# Patient Record
Sex: MALE | Race: WHITE | Employment: OTHER | URBAN - METROPOLITAN AREA
[De-identification: names, ages, dates, MRNs, and addresses within clinical notes are randomized per-mention and may not be internally consistent; named-entity substitution may affect disease eponyms.]

---

## 2018-08-28 ENCOUNTER — HOSPITAL ENCOUNTER (EMERGENCY)
Age: 78
Discharge: HOME | End: 2018-08-29
Attending: EMERGENCY MEDICINE | Admitting: EMERGENCY MEDICINE
Payer: MEDICARE

## 2018-08-28 VITALS
HEIGHT: 69 IN | TEMPERATURE: 97.7 F | SYSTOLIC BLOOD PRESSURE: 153 MMHG | HEART RATE: 72 BPM | DIASTOLIC BLOOD PRESSURE: 83 MMHG | RESPIRATION RATE: 18 BRPM | OXYGEN SATURATION: 96 %

## 2018-08-28 DIAGNOSIS — R11.2 NAUSEA AND VOMITING, INTRACTABILITY OF VOMITING NOT SPECIFIED, UNSPECIFIED VOMITING TYPE: Primary | ICD-10-CM

## 2018-08-28 LAB
ALBUMIN SERPL-MCNC: 3.9 G/DL (ref 3.5–5)
ALBUMIN/GLOB SERPL: 1 {RATIO} (ref 1.1–2.2)
ALP SERPL-CCNC: 71 U/L (ref 45–117)
ALT SERPL-CCNC: 13 U/L (ref 12–78)
ANION GAP SERPL CALC-SCNC: 12 MMOL/L (ref 5–15)
APPEARANCE UR: CLEAR
AST SERPL-CCNC: 11 U/L (ref 15–37)
BACTERIA URNS QL MICRO: NEGATIVE /HPF
BASOPHILS # BLD: 0.1 K/UL (ref 0–0.1)
BASOPHILS NFR BLD: 1 % (ref 0–1)
BILIRUB SERPL-MCNC: 0.3 MG/DL (ref 0.2–1)
BILIRUB UR QL: NEGATIVE
BUN SERPL-MCNC: 19 MG/DL (ref 6–20)
BUN/CREAT SERPL: 13 (ref 12–20)
CALCIUM SERPL-MCNC: 8.7 MG/DL (ref 8.5–10.1)
CHLORIDE SERPL-SCNC: 99 MMOL/L (ref 97–108)
CK MB CFR SERPL CALC: NORMAL % (ref 0–2.5)
CK MB SERPL-MCNC: <1 NG/ML (ref 5–25)
CK SERPL-CCNC: 56 U/L (ref 39–308)
CO2 SERPL-SCNC: 24 MMOL/L (ref 21–32)
COLOR UR: ABNORMAL
CREAT SERPL-MCNC: 1.44 MG/DL (ref 0.7–1.3)
DIFFERENTIAL METHOD BLD: ABNORMAL
EOSINOPHIL # BLD: 0 K/UL (ref 0–0.4)
EOSINOPHIL NFR BLD: 0 % (ref 0–7)
EPITH CASTS URNS QL MICRO: ABNORMAL /LPF
ERYTHROCYTE [DISTWIDTH] IN BLOOD BY AUTOMATED COUNT: 13.2 % (ref 11.5–14.5)
GLOBULIN SER CALC-MCNC: 3.9 G/DL (ref 2–4)
GLUCOSE SERPL-MCNC: 227 MG/DL (ref 65–100)
GLUCOSE UR STRIP.AUTO-MCNC: >1000 MG/DL
HCT VFR BLD AUTO: 40 % (ref 36.6–50.3)
HGB BLD-MCNC: 13.1 G/DL (ref 12.1–17)
HGB UR QL STRIP: NEGATIVE
HYALINE CASTS URNS QL MICRO: ABNORMAL /LPF (ref 0–5)
IMM GRANULOCYTES # BLD: 0.1 K/UL (ref 0–0.04)
IMM GRANULOCYTES NFR BLD AUTO: 1 % (ref 0–0.5)
KETONES UR QL STRIP.AUTO: 15 MG/DL
LEUKOCYTE ESTERASE UR QL STRIP.AUTO: NEGATIVE
LIPASE SERPL-CCNC: 195 U/L (ref 73–393)
LYMPHOCYTES # BLD: 0.7 K/UL (ref 0.8–3.5)
LYMPHOCYTES NFR BLD: 6 % (ref 12–49)
MCH RBC QN AUTO: 29.5 PG (ref 26–34)
MCHC RBC AUTO-ENTMCNC: 32.8 G/DL (ref 30–36.5)
MCV RBC AUTO: 90.1 FL (ref 80–99)
MONOCYTES # BLD: 0.6 K/UL (ref 0–1)
MONOCYTES NFR BLD: 5 % (ref 5–13)
NEUTS SEG # BLD: 9.6 K/UL (ref 1.8–8)
NEUTS SEG NFR BLD: 87 % (ref 32–75)
NITRITE UR QL STRIP.AUTO: NEGATIVE
NRBC # BLD: 0 K/UL (ref 0–0.01)
NRBC BLD-RTO: 0 PER 100 WBC
PH UR STRIP: 5 [PH] (ref 5–8)
PLATELET # BLD AUTO: 303 K/UL (ref 150–400)
PMV BLD AUTO: 10.4 FL (ref 8.9–12.9)
POTASSIUM SERPL-SCNC: 3.9 MMOL/L (ref 3.5–5.1)
PROT SERPL-MCNC: 7.8 G/DL (ref 6.4–8.2)
PROT UR STRIP-MCNC: NEGATIVE MG/DL
RBC # BLD AUTO: 4.44 M/UL (ref 4.1–5.7)
RBC #/AREA URNS HPF: ABNORMAL /HPF (ref 0–5)
RBC MORPH BLD: ABNORMAL
SODIUM SERPL-SCNC: 135 MMOL/L (ref 136–145)
SP GR UR REFRACTOMETRY: 1.02 (ref 1–1.03)
TROPONIN I BLD-MCNC: <0.04 NG/ML (ref 0–0.08)
TROPONIN I SERPL-MCNC: <0.05 NG/ML
UA: UC IF INDICATED,UAUC: ABNORMAL
UROBILINOGEN UR QL STRIP.AUTO: 0.2 EU/DL (ref 0.2–1)
WBC # BLD AUTO: 11.1 K/UL (ref 4.1–11.1)
WBC URNS QL MICRO: ABNORMAL /HPF (ref 0–4)

## 2018-08-28 PROCEDURE — 83690 ASSAY OF LIPASE: CPT | Performed by: EMERGENCY MEDICINE

## 2018-08-28 PROCEDURE — 80053 COMPREHEN METABOLIC PANEL: CPT | Performed by: EMERGENCY MEDICINE

## 2018-08-28 PROCEDURE — 82550 ASSAY OF CK (CPK): CPT | Performed by: EMERGENCY MEDICINE

## 2018-08-28 PROCEDURE — 84484 ASSAY OF TROPONIN QUANT: CPT | Performed by: EMERGENCY MEDICINE

## 2018-08-28 PROCEDURE — 96374 THER/PROPH/DIAG INJ IV PUSH: CPT

## 2018-08-28 PROCEDURE — 74011250636 HC RX REV CODE- 250/636: Performed by: EMERGENCY MEDICINE

## 2018-08-28 PROCEDURE — 93005 ELECTROCARDIOGRAM TRACING: CPT

## 2018-08-28 PROCEDURE — 99285 EMERGENCY DEPT VISIT HI MDM: CPT

## 2018-08-28 PROCEDURE — 81001 URINALYSIS AUTO W/SCOPE: CPT | Performed by: EMERGENCY MEDICINE

## 2018-08-28 PROCEDURE — 96361 HYDRATE IV INFUSION ADD-ON: CPT

## 2018-08-28 PROCEDURE — 85025 COMPLETE CBC W/AUTO DIFF WBC: CPT | Performed by: EMERGENCY MEDICINE

## 2018-08-28 PROCEDURE — 36415 COLL VENOUS BLD VENIPUNCTURE: CPT | Performed by: EMERGENCY MEDICINE

## 2018-08-28 PROCEDURE — 74011250637 HC RX REV CODE- 250/637: Performed by: EMERGENCY MEDICINE

## 2018-08-28 RX ORDER — ONDANSETRON 4 MG/1
4 TABLET, ORALLY DISINTEGRATING ORAL
Qty: 16 TAB | Refills: 0 | Status: SHIPPED | OUTPATIENT
Start: 2018-08-28 | End: 2021-01-01

## 2018-08-28 RX ORDER — BUTALBITAL, ACETAMINOPHEN AND CAFFEINE 50; 325; 40 MG/1; MG/1; MG/1
2 TABLET ORAL
Status: COMPLETED | OUTPATIENT
Start: 2018-08-28 | End: 2018-08-28

## 2018-08-28 RX ORDER — ONDANSETRON 2 MG/ML
4 INJECTION INTRAMUSCULAR; INTRAVENOUS
Status: COMPLETED | OUTPATIENT
Start: 2018-08-28 | End: 2018-08-28

## 2018-08-28 RX ADMIN — ONDANSETRON 4 MG: 2 INJECTION, SOLUTION INTRAMUSCULAR; INTRAVENOUS at 21:06

## 2018-08-28 RX ADMIN — SODIUM CHLORIDE 1000 ML: 900 INJECTION, SOLUTION INTRAVENOUS at 22:45

## 2018-08-28 RX ADMIN — BUTALBITAL, ACETAMINOPHEN AND CAFFEINE 2 TABLET: 50; 325; 40 TABLET ORAL at 22:27

## 2018-08-29 LAB
ATRIAL RATE: 68 BPM
CALCULATED P AXIS, ECG09: 63 DEGREES
CALCULATED R AXIS, ECG10: -12 DEGREES
CALCULATED T AXIS, ECG11: 64 DEGREES
DIAGNOSIS, 93000: NORMAL
P-R INTERVAL, ECG05: 156 MS
Q-T INTERVAL, ECG07: 406 MS
QRS DURATION, ECG06: 78 MS
QTC CALCULATION (BEZET), ECG08: 431 MS
VENTRICULAR RATE, ECG03: 68 BPM

## 2018-08-29 NOTE — PROGRESS NOTES
Dr Lissett Machado reviewed discharge instructions with the patient. The patient verbalized understanding. All questions and concerns were addressed. The patient  is discharged in the care of family members with instructions and prescriptions in hand. Pt is alert and oriented x 4. Respirations are clear and unlabored.

## 2018-08-29 NOTE — DISCHARGE INSTRUCTIONS
Nausea and Vomiting: Care Instructions  Your Care Instructions    When you are nauseated, you may feel weak and sweaty and notice a lot of saliva in your mouth. Nausea often leads to vomiting. Most of the time you do not need to worry about nausea and vomiting, but they can be signs of other illnesses. Two common causes of nausea and vomiting are stomach flu and food poisoning. Nausea and vomiting from viral stomach flu will usually start to improve within 24 hours. Nausea and vomiting from food poisoning may last from 12 to 48 hours. The doctor has checked you carefully, but problems can develop later. If you notice any problems or new symptoms, get medical treatment right away. Follow-up care is a key part of your treatment and safety. Be sure to make and go to all appointments, and call your doctor if you are having problems. It's also a good idea to know your test results and keep a list of the medicines you take. How can you care for yourself at home? · To prevent dehydration, drink plenty of fluids, enough so that your urine is light yellow or clear like water. Choose water and other caffeine-free clear liquids until you feel better. If you have kidney, heart, or liver disease and have to limit fluids, talk with your doctor before you increase the amount of fluids you drink. · Rest in bed until you feel better. · When you are able to eat, try clear soups, mild foods, and liquids until all symptoms are gone for 12 to 48 hours. Other good choices include dry toast, crackers, cooked cereal, and gelatin dessert, such as Jell-O. When should you call for help? Call 911 anytime you think you may need emergency care. For example, call if:    · You passed out (lost consciousness).    Call your doctor now or seek immediate medical care if:    · You have symptoms of dehydration, such as:  ¨ Dry eyes and a dry mouth. ¨ Passing only a little dark urine.   ¨ Feeling thirstier than usual.     · You have new or worsening belly pain.     · You have a new or higher fever.     · You vomit blood or what looks like coffee grounds.    Watch closely for changes in your health, and be sure to contact your doctor if:    · You have ongoing nausea and vomiting.     · Your vomiting is getting worse.     · Your vomiting lasts longer than 2 days.     · You are not getting better as expected. Where can you learn more? Go to http://leigh-jan.info/. Enter 25 [id number] in the search box to learn more about \"Nausea and Vomiting: Care Instructions. \"  Current as of: November 20, 2017  Content Version: 11.7  © 6712-1573 [employer]. Care instructions adapted under license by [employer] (which disclaims liability or warranty for this information). If you have questions about a medical condition or this instruction, always ask your healthcare professional. [address] any warranty or liability for your use of this information.

## 2018-08-29 NOTE — ED NOTES
[facility] According to [facility] he was found lying down on the floor with an emesis basin. Patient was alert and oriented but with complaints of nausea. Patient received on dose of zofran en route to [facility].

## 2018-08-29 NOTE — ED PROVIDER NOTES
EMERGENCY DEPARTMENT HISTORY AND PHYSICAL EXAM 
 
 
Date: 8/28/2018 Patient Name: Adryan Howe History of Presenting Illness Chief Complaint Patient presents with  Nausea 1 episode of vomiting around 1415 this afternoon  Vomiting History Provided By: Patient and EMS 
 
HPI: Adryan Howe, 66 y.o. male with PMHx presents via EMS to the ED with cc of sudden onset nausea and vomiting with associated dizziness and diaphoresis that started around 14:15 today. Pt states that he was sitting in his recliner when his symptoms began. Per EMS, pt was on floor with emesis basin on their arrival, and was given Zofran en route to hospital. He denies any abd pain, CP, or SOB. There are no other complaints, changes, or physical findings at this time. PCP: Maisha Toussaint MD 
 
Past History Past Medical History: No past medical history on file. Past Surgical History: No past surgical history on file. Family History: No family history on file. Social History: 
Social History Substance Use Topics  Smoking status: Not on file  Smokeless tobacco: Not on file  Alcohol use Not on file Allergies: Allergies Allergen Reactions  Pcn [Penicillins] Rash Review of Systems Review of Systems Constitutional: Positive for diaphoresis. Negative for chills, fatigue and fever. HENT: Negative for congestion and rhinorrhea. Eyes: Negative for visual disturbance. Respiratory: Negative for cough, shortness of breath and wheezing. Cardiovascular: Negative for chest pain and palpitations. Gastrointestinal: Positive for nausea and vomiting. Negative for abdominal distention, abdominal pain, constipation and diarrhea. Endocrine: Negative. Genitourinary: Negative for difficulty urinating and dysuria. Musculoskeletal: Negative. Skin: Negative for rash. Neurological: Positive for dizziness. Negative for weakness and light-headedness. Psychiatric/Behavioral: Negative for suicidal ideas. Physical Exam  
Physical Exam  
Constitutional: He is oriented to person, place, and time. He appears well-developed and well-nourished. No distress. HENT:  
Head: Normocephalic and atraumatic. Mouth/Throat: Oropharynx is clear and moist.  
Eyes: Conjunctivae and EOM are normal.  
Neck: Neck supple. No JVD present. No tracheal deviation present. Cardiovascular: Normal rate, regular rhythm and intact distal pulses. Exam reveals no gallop and no friction rub. No murmur heard. Pulmonary/Chest: Effort normal and breath sounds normal. No stridor. No respiratory distress. He has no wheezes. Abdominal: Soft. Bowel sounds are normal. He exhibits no distension and no mass. There is no tenderness. There is no guarding. Musculoskeletal: Normal range of motion. He exhibits no edema or tenderness. No deformity Neurological: He is alert and oriented to person, place, and time. He has normal strength. No focal deficits Skin: Skin is warm, dry and intact. No rash noted. Psychiatric: He has a normal mood and affect. His behavior is normal. Judgment and thought content normal.  
Nursing note and vitals reviewed. Diagnostic Study Results Labs - Recent Results (from the past 12 hour(s)) EKG, 12 LEAD, INITIAL Collection Time: 08/28/18  9:01 PM  
Result Value Ref Range Ventricular Rate 68 BPM  
 Atrial Rate 68 BPM  
 P-R Interval 156 ms QRS Duration 78 ms Q-T Interval 406 ms QTC Calculation (Bezet) 431 ms Calculated P Axis 63 degrees Calculated R Axis -12 degrees Calculated T Axis 64 degrees Diagnosis Sinus rhythm with premature atrial complexes No previous ECGs available METABOLIC PANEL, COMPREHENSIVE Collection Time: 08/28/18  9:06 PM  
Result Value Ref Range Sodium 135 (L) 136 - 145 mmol/L Potassium 3.9 3.5 - 5.1 mmol/L  Chloride 99 97 - 108 mmol/L  
 CO2 24 21 - 32 mmol/L  
 Anion gap 12 5 - 15 mmol/L Glucose 227 (H) 65 - 100 mg/dL BUN 19 6 - 20 MG/DL Creatinine 1.44 (H) 0.70 - 1.30 MG/DL  
 BUN/Creatinine ratio 13 12 - 20 GFR est AA 58 (L) >60 ml/min/1.73m2 GFR est non-AA 47 (L) >60 ml/min/1.73m2 Calcium 8.7 8.5 - 10.1 MG/DL Bilirubin, total 0.3 0.2 - 1.0 MG/DL  
 ALT (SGPT) 13 12 - 78 U/L  
 AST (SGOT) 11 (L) 15 - 37 U/L Alk. phosphatase 71 45 - 117 U/L Protein, total 7.8 6.4 - 8.2 g/dL Albumin 3.9 3.5 - 5.0 g/dL Globulin 3.9 2.0 - 4.0 g/dL A-G Ratio 1.0 (L) 1.1 - 2.2 CK W/ CKMB & INDEX Collection Time: 08/28/18  9:06 PM  
Result Value Ref Range CK 56 39 - 308 U/L  
 CK - MB <1.0 <3.6 NG/ML  
 CK-MB Index Cannot be calculated 0 - 2.5    
CBC WITH AUTOMATED DIFF Collection Time: 08/28/18  9:06 PM  
Result Value Ref Range WBC 11.1 4.1 - 11.1 K/uL  
 RBC 4.44 4.10 - 5.70 M/uL  
 HGB 13.1 12.1 - 17.0 g/dL HCT 40.0 36.6 - 50.3 % MCV 90.1 80.0 - 99.0 FL  
 MCH 29.5 26.0 - 34.0 PG  
 MCHC 32.8 30.0 - 36.5 g/dL  
 RDW 13.2 11.5 - 14.5 % PLATELET 719 725 - 482 K/uL MPV 10.4 8.9 - 12.9 FL  
 NRBC 0.0 0  WBC ABSOLUTE NRBC 0.00 0.00 - 0.01 K/uL NEUTROPHILS 87 (H) 32 - 75 % LYMPHOCYTES 6 (L) 12 - 49 % MONOCYTES 5 5 - 13 % EOSINOPHILS 0 0 - 7 % BASOPHILS 1 0 - 1 % IMMATURE GRANULOCYTES 1 (H) 0.0 - 0.5 % ABS. NEUTROPHILS 9.6 (H) 1.8 - 8.0 K/UL  
 ABS. LYMPHOCYTES 0.7 (L) 0.8 - 3.5 K/UL  
 ABS. MONOCYTES 0.6 0.0 - 1.0 K/UL  
 ABS. EOSINOPHILS 0.0 0.0 - 0.4 K/UL  
 ABS. BASOPHILS 0.1 0.0 - 0.1 K/UL  
 ABS. IMM. GRANS. 0.1 (H) 0.00 - 0.04 K/UL  
 DF AUTOMATED    
 RBC COMMENTS NORMOCYTIC, NORMOCHROMIC    
TROPONIN I Collection Time: 08/28/18  9:06 PM  
Result Value Ref Range Troponin-I, Qt. <0.05 <0.05 ng/mL LIPASE Collection Time: 08/28/18  9:06 PM  
Result Value Ref Range Lipase 195 73 - 393 U/L  
URINALYSIS W/ REFLEX CULTURE Collection Time: 08/28/18 10:46 PM  
Result Value Ref Range Color YELLOW/STRAW Appearance CLEAR CLEAR Specific gravity 1.021 1.003 - 1.030    
 pH (UA) 5.0 5.0 - 8.0 Protein NEGATIVE  NEG mg/dL Glucose >1000 (A) NEG mg/dL Ketone 15 (A) NEG mg/dL Bilirubin NEGATIVE  NEG Blood NEGATIVE  NEG Urobilinogen 0.2 0.2 - 1.0 EU/dL Nitrites NEGATIVE  NEG Leukocyte Esterase NEGATIVE  NEG    
 WBC 0-4 0 - 4 /hpf  
 RBC 0-5 0 - 5 /hpf Epithelial cells FEW FEW /lpf Bacteria NEGATIVE  NEG /hpf  
 UA:UC IF INDICATED CULTURE NOT INDICATED BY UA RESULT CNI Hyaline cast 0-2 0 - 5 /lpf POC TROPONIN-I Collection Time: 08/28/18 11:45 PM  
Result Value Ref Range Troponin-I (POC) <0.04 0.00 - 0.08 ng/mL Medical Decision Making I am the first provider for this patient. I reviewed the vital signs, available nursing notes, past medical history, past surgical history, family history and social history. Vital Signs-Reviewed the patient's vital signs. Patient Vitals for the past 12 hrs: 
 Temp Pulse Resp BP SpO2  
08/28/18 2230 - - - 153/83 96 % 08/28/18 2200 - - - 139/79 98 % 08/28/18 2130 - - - 143/80 96 % 08/28/18 2104 97.7 °F (36.5 °C) 72 18 - 96 % Pulse Oximetry Analysis - 99% on room air Cardiac Monitor:  
Rate: 75 bpm 
Rhythm: Normal Sinus Rhythm EKG interpretation: (Preliminary) 21:01 Rhythm: sinus rhythm with PAC's; and regular . Rate (approx.): 68; Axis: left axis deviation; TN interval: normal; QRS interval: normal ; ST/T wave: no ST changes; Other findings: otherwise normal. 
Written by Maria R Rangel ED Scribe, as dictated by Johnny Sorensen DO. Records Reviewed: Nursing Notes, Old Medical Records and Ambulance Run Sheet Provider Notes (Medical Decision Making): Abd exam was benign, DDx include gastritis, PUD, gastroenteritis, atypical presentation of ACS, pancreatitis, UTI 
 
ED Course:  
Initial assessment performed.  The patients presenting problems have been discussed, and they are in agreement with the care plan formulated and outlined with them. I have encouraged them to ask questions as they arise throughout their visit. 11:16 PM 
Pt has been re-evaluated and reports to be feeling better, he is tolerating PO, will order another POC troponin and if normal he can be discharged. Disposition: 
 
DISCHARGE NOTE 
12:19 AM 
The patient has been re-evaluated and is ready for discharge. Reviewed available results with patient. Counseled patient on diagnosis and care plan. Patient has expressed understanding, and all questions have been answered. Patient agrees with plan and agrees to follow up as recommended, or return to the ED if their symptoms worsen. Discharge instructions have been provided and explained to the patient, along with reasons to return to the ED. PLAN: 
1. Current Discharge Medication List  
  
START taking these medications Details  
ondansetron (ZOFRAN ODT) 4 mg disintegrating tablet Take 1 Tab by mouth every eight (8) hours as needed for Nausea. Qty: 16 Tab, Refills: 0  
  
  
 
2. Follow-up Information Follow up With Details Comments Contact Info Sujey Cano MD Schedule an appointment as soon as possible for a visit  [address] [facility] 
[address] [address] 
[phone] [facility] EMERGENCY DEPT  As needed, If symptoms worsen [address] [facility] 
[phone] Return to ED if worse Diagnosis Clinical Impression: 1. Nausea and vomiting, intractability of vomiting not specified, unspecified vomiting type Attestations: This note is prepared by Jadyn Watkins, acting as Scribe for Luis Armando Luu DO. Luis Armando Luu DO: The scribe's documentation has been prepared under my direction and personally reviewed by me in its entirety. I confirm that the note above accurately reflects all work, treatment, procedures, and medical decision making performed by me.

## 2021-01-01 ENCOUNTER — HOME CARE VISIT (OUTPATIENT)
Dept: HOSPICE | Facility: HOSPICE | Age: 81
End: 2021-01-01
Payer: MEDICARE

## 2021-01-01 ENCOUNTER — APPOINTMENT (OUTPATIENT)
Dept: GENERAL RADIOLOGY | Age: 81
DRG: 064 | End: 2021-01-01
Attending: NURSE PRACTITIONER
Payer: MEDICARE

## 2021-01-01 ENCOUNTER — HOME CARE VISIT (OUTPATIENT)
Dept: SCHEDULING | Facility: HOME HEALTH | Age: 81
End: 2021-01-01
Payer: MEDICARE

## 2021-01-01 ENCOUNTER — HOSPICE ADMISSION (OUTPATIENT)
Dept: HOSPICE | Facility: HOSPICE | Age: 81
End: 2021-01-01
Payer: MEDICARE

## 2021-01-01 ENCOUNTER — APPOINTMENT (OUTPATIENT)
Dept: GENERAL RADIOLOGY | Age: 81
DRG: 064 | End: 2021-01-01
Attending: HOSPITALIST
Payer: MEDICARE

## 2021-01-01 ENCOUNTER — HOSPITAL ENCOUNTER (EMERGENCY)
Age: 81
Discharge: ANOTHER HOSPITAL | End: 2021-04-07
Attending: STUDENT IN AN ORGANIZED HEALTH CARE EDUCATION/TRAINING PROGRAM
Payer: MEDICARE

## 2021-01-01 ENCOUNTER — APPOINTMENT (OUTPATIENT)
Dept: CT IMAGING | Age: 81
DRG: 064 | End: 2021-01-01
Attending: NURSE PRACTITIONER
Payer: MEDICARE

## 2021-01-01 ENCOUNTER — APPOINTMENT (OUTPATIENT)
Dept: NON INVASIVE DIAGNOSTICS | Age: 81
DRG: 064 | End: 2021-01-01
Attending: NURSE PRACTITIONER
Payer: MEDICARE

## 2021-01-01 ENCOUNTER — APPOINTMENT (OUTPATIENT)
Dept: CT IMAGING | Age: 81
End: 2021-01-01
Attending: STUDENT IN AN ORGANIZED HEALTH CARE EDUCATION/TRAINING PROGRAM
Payer: MEDICARE

## 2021-01-01 ENCOUNTER — APPOINTMENT (OUTPATIENT)
Dept: MRI IMAGING | Age: 81
DRG: 064 | End: 2021-01-01
Attending: NURSE PRACTITIONER
Payer: MEDICARE

## 2021-01-01 ENCOUNTER — HOSPITAL ENCOUNTER (INPATIENT)
Age: 81
LOS: 8 days | Discharge: HOSPICE | DRG: 064 | End: 2021-04-15
Attending: INTERNAL MEDICINE | Admitting: INTERNAL MEDICINE
Payer: MEDICARE

## 2021-01-01 ENCOUNTER — APPOINTMENT (OUTPATIENT)
Dept: GENERAL RADIOLOGY | Age: 81
End: 2021-01-01
Attending: STUDENT IN AN ORGANIZED HEALTH CARE EDUCATION/TRAINING PROGRAM
Payer: MEDICARE

## 2021-01-01 VITALS
HEART RATE: 86 BPM | OXYGEN SATURATION: 90 % | RESPIRATION RATE: 16 BRPM | SYSTOLIC BLOOD PRESSURE: 192 MMHG | DIASTOLIC BLOOD PRESSURE: 102 MMHG | TEMPERATURE: 99.5 F

## 2021-01-01 VITALS
HEART RATE: 104 BPM | TEMPERATURE: 99.6 F | WEIGHT: 190.04 LBS | OXYGEN SATURATION: 96 % | BODY MASS INDEX: 28.15 KG/M2 | SYSTOLIC BLOOD PRESSURE: 143 MMHG | RESPIRATION RATE: 20 BRPM | DIASTOLIC BLOOD PRESSURE: 68 MMHG | HEIGHT: 69 IN

## 2021-01-01 VITALS
DIASTOLIC BLOOD PRESSURE: 104 MMHG | SYSTOLIC BLOOD PRESSURE: 152 MMHG | RESPIRATION RATE: 18 BRPM | OXYGEN SATURATION: 96 % | HEART RATE: 80 BPM

## 2021-01-01 VITALS
SYSTOLIC BLOOD PRESSURE: 198 MMHG | OXYGEN SATURATION: 96 % | WEIGHT: 194 LBS | DIASTOLIC BLOOD PRESSURE: 100 MMHG | TEMPERATURE: 97.2 F | RESPIRATION RATE: 22 BRPM | HEART RATE: 80 BPM | BODY MASS INDEX: 30.38 KG/M2

## 2021-01-01 VITALS
OXYGEN SATURATION: 96 % | RESPIRATION RATE: 21 BRPM | DIASTOLIC BLOOD PRESSURE: 116 MMHG | WEIGHT: 194.45 LBS | SYSTOLIC BLOOD PRESSURE: 186 MMHG | BODY MASS INDEX: 30.52 KG/M2 | TEMPERATURE: 100.1 F | HEART RATE: 91 BPM | HEIGHT: 67 IN

## 2021-01-01 VITALS
TEMPERATURE: 99 F | DIASTOLIC BLOOD PRESSURE: 60 MMHG | HEART RATE: 75 BPM | SYSTOLIC BLOOD PRESSURE: 180 MMHG | OXYGEN SATURATION: 94 % | RESPIRATION RATE: 18 BRPM

## 2021-01-01 VITALS
DIASTOLIC BLOOD PRESSURE: 98 MMHG | OXYGEN SATURATION: 94 % | SYSTOLIC BLOOD PRESSURE: 200 MMHG | RESPIRATION RATE: 18 BRPM | HEART RATE: 87 BPM | TEMPERATURE: 98.2 F

## 2021-01-01 VITALS
OXYGEN SATURATION: 95 % | RESPIRATION RATE: 18 BRPM | SYSTOLIC BLOOD PRESSURE: 136 MMHG | DIASTOLIC BLOOD PRESSURE: 78 MMHG | HEART RATE: 91 BPM | TEMPERATURE: 98.7 F

## 2021-01-01 DIAGNOSIS — I10 ESSENTIAL HYPERTENSION: ICD-10-CM

## 2021-01-01 DIAGNOSIS — I61.9 LEFT-SIDED NONTRAUMATIC INTRACEREBRAL HEMORRHAGE, UNSPECIFIED CEREBRAL LOCATION (HCC): ICD-10-CM

## 2021-01-01 DIAGNOSIS — Z71.89 GOALS OF CARE, COUNSELING/DISCUSSION: ICD-10-CM

## 2021-01-01 DIAGNOSIS — I61.0 BASAL GANGLIA HEMORRHAGE (HCC): ICD-10-CM

## 2021-01-01 DIAGNOSIS — R53.1 RIGHT SIDED WEAKNESS: ICD-10-CM

## 2021-01-01 DIAGNOSIS — Z51.5 END OF LIFE CARE: ICD-10-CM

## 2021-01-01 DIAGNOSIS — I61.9 INTRAPARENCHYMAL HEMORRHAGE OF BRAIN (HCC): Primary | ICD-10-CM

## 2021-01-01 DIAGNOSIS — R63.30 FEEDING DIFFICULTIES: ICD-10-CM

## 2021-01-01 DIAGNOSIS — R47.1 DYSARTHRIA: ICD-10-CM

## 2021-01-01 DIAGNOSIS — Z91.89 AT HIGH RISK FOR ASPIRATION: ICD-10-CM

## 2021-01-01 LAB
ALBUMIN SERPL-MCNC: 4.1 G/DL (ref 3.5–5)
ALBUMIN/GLOB SERPL: 1 {RATIO} (ref 1.1–2.2)
ALP SERPL-CCNC: 84 U/L (ref 45–117)
ALT SERPL-CCNC: 13 U/L (ref 12–78)
ANION GAP SERPL CALC-SCNC: 10 MMOL/L (ref 5–15)
ANION GAP SERPL CALC-SCNC: 10 MMOL/L (ref 5–15)
ANION GAP SERPL CALC-SCNC: 11 MMOL/L (ref 5–15)
ANION GAP SERPL CALC-SCNC: 5 MMOL/L (ref 5–15)
ANION GAP SERPL CALC-SCNC: 6 MMOL/L (ref 5–15)
ANION GAP SERPL CALC-SCNC: 7 MMOL/L (ref 5–15)
ANION GAP SERPL CALC-SCNC: 8 MMOL/L (ref 5–15)
ANION GAP SERPL CALC-SCNC: 9 MMOL/L (ref 5–15)
ANION GAP SERPL CALC-SCNC: 9 MMOL/L (ref 5–15)
APPEARANCE UR: CLEAR
ARTERIAL PATENCY WRIST A: NO
AST SERPL-CCNC: 10 U/L (ref 15–37)
ATRIAL RATE: 104 BPM
BACTERIA SPEC CULT: ABNORMAL
BACTERIA SPEC CULT: ABNORMAL
BACTERIA SPEC CULT: NORMAL
BACTERIA URNS QL MICRO: NEGATIVE /HPF
BASE DEFICIT BLD-SCNC: 1 MMOL/L
BASOPHILS # BLD: 0 K/UL (ref 0–0.1)
BASOPHILS NFR BLD: 0 % (ref 0–1)
BDY SITE: ABNORMAL
BILIRUB SERPL-MCNC: 0.5 MG/DL (ref 0.2–1)
BILIRUB UR QL CFM: NEGATIVE
BNP SERPL-MCNC: 132 PG/ML
BUN SERPL-MCNC: 21 MG/DL (ref 6–20)
BUN SERPL-MCNC: 22 MG/DL (ref 6–20)
BUN SERPL-MCNC: 26 MG/DL (ref 6–20)
BUN SERPL-MCNC: 32 MG/DL (ref 6–20)
BUN SERPL-MCNC: 35 MG/DL (ref 6–20)
BUN SERPL-MCNC: 39 MG/DL (ref 6–20)
BUN SERPL-MCNC: 40 MG/DL (ref 6–20)
BUN SERPL-MCNC: 42 MG/DL (ref 6–20)
BUN SERPL-MCNC: 46 MG/DL (ref 6–20)
BUN/CREAT SERPL: 18 (ref 12–20)
BUN/CREAT SERPL: 19 (ref 12–20)
BUN/CREAT SERPL: 23 (ref 12–20)
BUN/CREAT SERPL: 28 (ref 12–20)
BUN/CREAT SERPL: 32 (ref 12–20)
BUN/CREAT SERPL: 32 (ref 12–20)
BUN/CREAT SERPL: 34 (ref 12–20)
BUN/CREAT SERPL: 34 (ref 12–20)
BUN/CREAT SERPL: 37 (ref 12–20)
CA-I BLD-SCNC: 1.11 MMOL/L (ref 1.12–1.32)
CALCIUM SERPL-MCNC: 8 MG/DL (ref 8.5–10.1)
CALCIUM SERPL-MCNC: 8 MG/DL (ref 8.5–10.1)
CALCIUM SERPL-MCNC: 8.1 MG/DL (ref 8.5–10.1)
CALCIUM SERPL-MCNC: 8.3 MG/DL (ref 8.5–10.1)
CALCIUM SERPL-MCNC: 8.4 MG/DL (ref 8.5–10.1)
CALCIUM SERPL-MCNC: 8.6 MG/DL (ref 8.5–10.1)
CALCIUM SERPL-MCNC: 8.7 MG/DL (ref 8.5–10.1)
CALCIUM SERPL-MCNC: 8.9 MG/DL (ref 8.5–10.1)
CALCIUM SERPL-MCNC: 8.9 MG/DL (ref 8.5–10.1)
CALCULATED P AXIS, ECG09: 30 DEGREES
CALCULATED R AXIS, ECG10: -19 DEGREES
CALCULATED T AXIS, ECG11: 63 DEGREES
CHLORIDE SERPL-SCNC: 102 MMOL/L (ref 97–108)
CHLORIDE SERPL-SCNC: 105 MMOL/L (ref 97–108)
CHLORIDE SERPL-SCNC: 107 MMOL/L (ref 97–108)
CHLORIDE SERPL-SCNC: 110 MMOL/L (ref 97–108)
CHLORIDE SERPL-SCNC: 113 MMOL/L (ref 97–108)
CHLORIDE SERPL-SCNC: 113 MMOL/L (ref 97–108)
CHLORIDE SERPL-SCNC: 114 MMOL/L (ref 97–108)
CHLORIDE SERPL-SCNC: 116 MMOL/L (ref 97–108)
CHLORIDE SERPL-SCNC: 99 MMOL/L (ref 97–108)
CHOLEST SERPL-MCNC: 160 MG/DL
CO2 SERPL-SCNC: 21 MMOL/L (ref 21–32)
CO2 SERPL-SCNC: 22 MMOL/L (ref 21–32)
CO2 SERPL-SCNC: 23 MMOL/L (ref 21–32)
CO2 SERPL-SCNC: 24 MMOL/L (ref 21–32)
CO2 SERPL-SCNC: 25 MMOL/L (ref 21–32)
CO2 SERPL-SCNC: 25 MMOL/L (ref 21–32)
CO2 SERPL-SCNC: 26 MMOL/L (ref 21–32)
COLOR UR: ABNORMAL
COVID-19 RAPID TEST, COVR: NOT DETECTED
CREAT SERPL-MCNC: 1.11 MG/DL (ref 0.7–1.3)
CREAT SERPL-MCNC: 1.12 MG/DL (ref 0.7–1.3)
CREAT SERPL-MCNC: 1.14 MG/DL (ref 0.7–1.3)
CREAT SERPL-MCNC: 1.14 MG/DL (ref 0.7–1.3)
CREAT SERPL-MCNC: 1.15 MG/DL (ref 0.7–1.3)
CREAT SERPL-MCNC: 1.16 MG/DL (ref 0.7–1.3)
CREAT SERPL-MCNC: 1.16 MG/DL (ref 0.7–1.3)
CREAT SERPL-MCNC: 1.21 MG/DL (ref 0.7–1.3)
CREAT SERPL-MCNC: 1.44 MG/DL (ref 0.7–1.3)
DIAGNOSIS, 93000: NORMAL
DIFFERENTIAL METHOD BLD: ABNORMAL
EOSINOPHIL # BLD: 0 K/UL (ref 0–0.4)
EOSINOPHIL NFR BLD: 0 % (ref 0–7)
EPITH CASTS URNS QL MICRO: ABNORMAL /LPF
ERYTHROCYTE [DISTWIDTH] IN BLOOD BY AUTOMATED COUNT: 14.1 % (ref 11.5–14.5)
ERYTHROCYTE [DISTWIDTH] IN BLOOD BY AUTOMATED COUNT: 14.1 % (ref 11.5–14.5)
ERYTHROCYTE [DISTWIDTH] IN BLOOD BY AUTOMATED COUNT: 14.7 % (ref 11.5–14.5)
ERYTHROCYTE [DISTWIDTH] IN BLOOD BY AUTOMATED COUNT: 15 % (ref 11.5–14.5)
ERYTHROCYTE [DISTWIDTH] IN BLOOD BY AUTOMATED COUNT: 15.1 % (ref 11.5–14.5)
ERYTHROCYTE [DISTWIDTH] IN BLOOD BY AUTOMATED COUNT: 15.2 % (ref 11.5–14.5)
ERYTHROCYTE [DISTWIDTH] IN BLOOD BY AUTOMATED COUNT: 15.3 % (ref 11.5–14.5)
EST. AVERAGE GLUCOSE BLD GHB EST-MCNC: 137 MG/DL
GAS FLOW.O2 O2 DELIVERY SYS: ABNORMAL L/MIN
GLOBULIN SER CALC-MCNC: 4.2 G/DL (ref 2–4)
GLUCOSE BLD STRIP.AUTO-MCNC: 199 MG/DL (ref 65–100)
GLUCOSE BLD STRIP.AUTO-MCNC: 210 MG/DL (ref 65–100)
GLUCOSE BLD STRIP.AUTO-MCNC: 211 MG/DL (ref 65–100)
GLUCOSE BLD STRIP.AUTO-MCNC: 214 MG/DL (ref 65–100)
GLUCOSE BLD STRIP.AUTO-MCNC: 219 MG/DL (ref 65–100)
GLUCOSE BLD STRIP.AUTO-MCNC: 220 MG/DL (ref 65–100)
GLUCOSE BLD STRIP.AUTO-MCNC: 223 MG/DL (ref 65–100)
GLUCOSE BLD STRIP.AUTO-MCNC: 228 MG/DL (ref 65–100)
GLUCOSE BLD STRIP.AUTO-MCNC: 231 MG/DL (ref 65–100)
GLUCOSE BLD STRIP.AUTO-MCNC: 231 MG/DL (ref 65–100)
GLUCOSE BLD STRIP.AUTO-MCNC: 234 MG/DL (ref 65–100)
GLUCOSE BLD STRIP.AUTO-MCNC: 236 MG/DL (ref 65–100)
GLUCOSE BLD STRIP.AUTO-MCNC: 239 MG/DL (ref 65–100)
GLUCOSE BLD STRIP.AUTO-MCNC: 240 MG/DL (ref 65–100)
GLUCOSE BLD STRIP.AUTO-MCNC: 243 MG/DL (ref 65–100)
GLUCOSE BLD STRIP.AUTO-MCNC: 249 MG/DL (ref 65–100)
GLUCOSE BLD STRIP.AUTO-MCNC: 254 MG/DL (ref 65–100)
GLUCOSE BLD STRIP.AUTO-MCNC: 255 MG/DL (ref 65–100)
GLUCOSE BLD STRIP.AUTO-MCNC: 259 MG/DL (ref 65–100)
GLUCOSE BLD STRIP.AUTO-MCNC: 259 MG/DL (ref 65–100)
GLUCOSE BLD STRIP.AUTO-MCNC: 262 MG/DL (ref 65–100)
GLUCOSE BLD STRIP.AUTO-MCNC: 267 MG/DL (ref 65–100)
GLUCOSE BLD STRIP.AUTO-MCNC: 280 MG/DL (ref 65–100)
GLUCOSE BLD STRIP.AUTO-MCNC: 281 MG/DL (ref 65–100)
GLUCOSE BLD STRIP.AUTO-MCNC: 287 MG/DL (ref 65–100)
GLUCOSE BLD STRIP.AUTO-MCNC: 289 MG/DL (ref 65–100)
GLUCOSE BLD STRIP.AUTO-MCNC: 295 MG/DL (ref 65–100)
GLUCOSE BLD STRIP.AUTO-MCNC: 299 MG/DL (ref 65–100)
GLUCOSE BLD STRIP.AUTO-MCNC: 302 MG/DL (ref 65–100)
GLUCOSE BLD STRIP.AUTO-MCNC: 317 MG/DL (ref 65–100)
GLUCOSE BLD STRIP.AUTO-MCNC: 347 MG/DL (ref 65–100)
GLUCOSE SERPL-MCNC: 190 MG/DL (ref 65–100)
GLUCOSE SERPL-MCNC: 196 MG/DL (ref 65–100)
GLUCOSE SERPL-MCNC: 223 MG/DL (ref 65–100)
GLUCOSE SERPL-MCNC: 234 MG/DL (ref 65–100)
GLUCOSE SERPL-MCNC: 239 MG/DL (ref 65–100)
GLUCOSE SERPL-MCNC: 266 MG/DL (ref 65–100)
GLUCOSE SERPL-MCNC: 268 MG/DL (ref 65–100)
GLUCOSE SERPL-MCNC: 304 MG/DL (ref 65–100)
GLUCOSE SERPL-MCNC: 305 MG/DL (ref 65–100)
GLUCOSE UR STRIP.AUTO-MCNC: NEGATIVE MG/DL
GRAM STN SPEC: ABNORMAL
HBA1C MFR BLD: 6.4 % (ref 4–5.6)
HCO3 BLD-SCNC: 24.4 MMOL/L (ref 22–26)
HCT VFR BLD AUTO: 35.6 % (ref 36.6–50.3)
HCT VFR BLD AUTO: 37.3 % (ref 36.6–50.3)
HCT VFR BLD AUTO: 38.5 % (ref 36.6–50.3)
HCT VFR BLD AUTO: 38.8 % (ref 36.6–50.3)
HCT VFR BLD AUTO: 40.8 % (ref 36.6–50.3)
HCT VFR BLD AUTO: 40.8 % (ref 36.6–50.3)
HCT VFR BLD AUTO: 44 % (ref 36.6–50.3)
HCT VFR BLD AUTO: 44.4 % (ref 36.6–50.3)
HCT VFR BLD AUTO: 44.4 % (ref 36.6–50.3)
HDLC SERPL-MCNC: 60 MG/DL
HDLC SERPL: 2.7 {RATIO} (ref 0–5)
HGB BLD-MCNC: 11.3 G/DL (ref 12.1–17)
HGB BLD-MCNC: 11.8 G/DL (ref 12.1–17)
HGB BLD-MCNC: 12.2 G/DL (ref 12.1–17)
HGB BLD-MCNC: 12.3 G/DL (ref 12.1–17)
HGB BLD-MCNC: 12.7 G/DL (ref 12.1–17)
HGB BLD-MCNC: 13.1 G/DL (ref 12.1–17)
HGB BLD-MCNC: 13.9 G/DL (ref 12.1–17)
HGB BLD-MCNC: 14.5 G/DL (ref 12.1–17)
HGB BLD-MCNC: 14.6 G/DL (ref 12.1–17)
HGB UR QL STRIP: NEGATIVE
HYALINE CASTS URNS QL MICRO: ABNORMAL /LPF (ref 0–5)
IMM GRANULOCYTES # BLD AUTO: 0.2 K/UL (ref 0–0.04)
IMM GRANULOCYTES NFR BLD AUTO: 1 % (ref 0–0.5)
KETONES UR QL STRIP.AUTO: 80 MG/DL
LACTATE SERPL-SCNC: 2 MMOL/L (ref 0.4–2)
LDLC SERPL CALC-MCNC: 69 MG/DL (ref 0–100)
LEUKOCYTE ESTERASE UR QL STRIP.AUTO: NEGATIVE
LIPID PROFILE,FLP: ABNORMAL
LYMPHOCYTES # BLD: 0.6 K/UL (ref 0.8–3.5)
LYMPHOCYTES NFR BLD: 4 % (ref 12–49)
MAGNESIUM SERPL-MCNC: 2.3 MG/DL (ref 1.6–2.4)
MAGNESIUM SERPL-MCNC: 2.4 MG/DL (ref 1.6–2.4)
MAGNESIUM SERPL-MCNC: 2.5 MG/DL (ref 1.6–2.4)
MAGNESIUM SERPL-MCNC: 2.6 MG/DL (ref 1.6–2.4)
MAGNESIUM SERPL-MCNC: 2.8 MG/DL (ref 1.6–2.4)
MAGNESIUM SERPL-MCNC: 2.9 MG/DL (ref 1.6–2.4)
MAGNESIUM SERPL-MCNC: 3 MG/DL (ref 1.6–2.4)
MCH RBC QN AUTO: 28.4 PG (ref 26–34)
MCH RBC QN AUTO: 28.6 PG (ref 26–34)
MCH RBC QN AUTO: 28.7 PG (ref 26–34)
MCH RBC QN AUTO: 28.7 PG (ref 26–34)
MCH RBC QN AUTO: 28.9 PG (ref 26–34)
MCH RBC QN AUTO: 29 PG (ref 26–34)
MCH RBC QN AUTO: 29.3 PG (ref 26–34)
MCHC RBC AUTO-ENTMCNC: 31.1 G/DL (ref 30–36.5)
MCHC RBC AUTO-ENTMCNC: 31.4 G/DL (ref 30–36.5)
MCHC RBC AUTO-ENTMCNC: 31.6 G/DL (ref 30–36.5)
MCHC RBC AUTO-ENTMCNC: 31.6 G/DL (ref 30–36.5)
MCHC RBC AUTO-ENTMCNC: 31.7 G/DL (ref 30–36.5)
MCHC RBC AUTO-ENTMCNC: 31.9 G/DL (ref 30–36.5)
MCHC RBC AUTO-ENTMCNC: 32.1 G/DL (ref 30–36.5)
MCHC RBC AUTO-ENTMCNC: 32.7 G/DL (ref 30–36.5)
MCHC RBC AUTO-ENTMCNC: 32.9 G/DL (ref 30–36.5)
MCV RBC AUTO: 87.1 FL (ref 80–99)
MCV RBC AUTO: 87.9 FL (ref 80–99)
MCV RBC AUTO: 90.4 FL (ref 80–99)
MCV RBC AUTO: 90.5 FL (ref 80–99)
MCV RBC AUTO: 90.8 FL (ref 80–99)
MCV RBC AUTO: 91.1 FL (ref 80–99)
MCV RBC AUTO: 91.5 FL (ref 80–99)
MCV RBC AUTO: 92.2 FL (ref 80–99)
MCV RBC AUTO: 92.3 FL (ref 80–99)
MONOCYTES # BLD: 0.5 K/UL (ref 0–1)
MONOCYTES NFR BLD: 3 % (ref 5–13)
NEUTS SEG # BLD: 14.4 K/UL (ref 1.8–8)
NEUTS SEG NFR BLD: 92 % (ref 32–75)
NITRITE UR QL STRIP.AUTO: NEGATIVE
NRBC # BLD: 0 K/UL (ref 0–0.01)
NRBC BLD-RTO: 0 PER 100 WBC
P-R INTERVAL, ECG05: 168 MS
PCO2 BLD: 40.4 MMHG (ref 35–45)
PH BLD: 7.39 [PH] (ref 7.35–7.45)
PH UR STRIP: 5.5 [PH] (ref 5–8)
PHOSPHATE SERPL-MCNC: 2 MG/DL (ref 2.6–4.7)
PHOSPHATE SERPL-MCNC: 2.1 MG/DL (ref 2.6–4.7)
PHOSPHATE SERPL-MCNC: 2.5 MG/DL (ref 2.6–4.7)
PHOSPHATE SERPL-MCNC: 2.8 MG/DL (ref 2.6–4.7)
PHOSPHATE SERPL-MCNC: 3.2 MG/DL (ref 2.6–4.7)
PHOSPHATE SERPL-MCNC: 3.2 MG/DL (ref 2.6–4.7)
PHOSPHATE SERPL-MCNC: 3.4 MG/DL (ref 2.6–4.7)
PHOSPHATE SERPL-MCNC: 4 MG/DL (ref 2.6–4.7)
PLATELET # BLD AUTO: 217 K/UL (ref 150–400)
PLATELET # BLD AUTO: 235 K/UL (ref 150–400)
PLATELET # BLD AUTO: 235 K/UL (ref 150–400)
PLATELET # BLD AUTO: 238 K/UL (ref 150–400)
PLATELET # BLD AUTO: 261 K/UL (ref 150–400)
PLATELET # BLD AUTO: 277 K/UL (ref 150–400)
PLATELET # BLD AUTO: 283 K/UL (ref 150–400)
PLATELET # BLD AUTO: 296 K/UL (ref 150–400)
PLATELET # BLD AUTO: 319 K/UL (ref 150–400)
PMV BLD AUTO: 10.3 FL (ref 8.9–12.9)
PMV BLD AUTO: 10.6 FL (ref 8.9–12.9)
PMV BLD AUTO: 10.7 FL (ref 8.9–12.9)
PMV BLD AUTO: 10.8 FL (ref 8.9–12.9)
PMV BLD AUTO: 11 FL (ref 8.9–12.9)
PMV BLD AUTO: 11 FL (ref 8.9–12.9)
PMV BLD AUTO: 11.1 FL (ref 8.9–12.9)
PMV BLD AUTO: 11.5 FL (ref 8.9–12.9)
PMV BLD AUTO: 11.8 FL (ref 8.9–12.9)
PO2 BLD: 26 MMHG (ref 80–100)
POTASSIUM SERPL-SCNC: 3.3 MMOL/L (ref 3.5–5.1)
POTASSIUM SERPL-SCNC: 3.5 MMOL/L (ref 3.5–5.1)
POTASSIUM SERPL-SCNC: 3.6 MMOL/L (ref 3.5–5.1)
POTASSIUM SERPL-SCNC: 3.8 MMOL/L (ref 3.5–5.1)
POTASSIUM SERPL-SCNC: 3.9 MMOL/L (ref 3.5–5.1)
POTASSIUM SERPL-SCNC: 4.2 MMOL/L (ref 3.5–5.1)
PROCALCITONIN SERPL-MCNC: <0.05 NG/ML
PROT SERPL-MCNC: 8.3 G/DL (ref 6.4–8.2)
PROT UR STRIP-MCNC: ABNORMAL MG/DL
Q-T INTERVAL, ECG07: 348 MS
QRS DURATION, ECG06: 78 MS
QTC CALCULATION (BEZET), ECG08: 457 MS
RBC # BLD AUTO: 3.86 M/UL (ref 4.1–5.7)
RBC # BLD AUTO: 4.11 M/UL (ref 4.1–5.7)
RBC # BLD AUTO: 4.26 M/UL (ref 4.1–5.7)
RBC # BLD AUTO: 4.26 M/UL (ref 4.1–5.7)
RBC # BLD AUTO: 4.42 M/UL (ref 4.1–5.7)
RBC # BLD AUTO: 4.51 M/UL (ref 4.1–5.7)
RBC # BLD AUTO: 4.81 M/UL (ref 4.1–5.7)
RBC # BLD AUTO: 5.05 M/UL (ref 4.1–5.7)
RBC # BLD AUTO: 5.1 M/UL (ref 4.1–5.7)
RBC #/AREA URNS HPF: ABNORMAL /HPF (ref 0–5)
RBC MORPH BLD: ABNORMAL
SAO2 % BLD: 47 % (ref 92–97)
SERVICE CMNT-IMP: ABNORMAL
SERVICE CMNT-IMP: NORMAL
SODIUM SERPL-SCNC: 133 MMOL/L (ref 136–145)
SODIUM SERPL-SCNC: 135 MMOL/L (ref 136–145)
SODIUM SERPL-SCNC: 136 MMOL/L (ref 136–145)
SODIUM SERPL-SCNC: 140 MMOL/L (ref 136–145)
SODIUM SERPL-SCNC: 140 MMOL/L (ref 136–145)
SODIUM SERPL-SCNC: 142 MMOL/L (ref 136–145)
SODIUM SERPL-SCNC: 146 MMOL/L (ref 136–145)
SODIUM SERPL-SCNC: 148 MMOL/L (ref 136–145)
SODIUM SERPL-SCNC: 148 MMOL/L (ref 136–145)
SOURCE, COVRS: NORMAL
SP GR UR REFRACTOMETRY: 1.02 (ref 1–1.03)
SPECIMEN TYPE: ABNORMAL
TRIGL SERPL-MCNC: 155 MG/DL (ref ?–150)
TROPONIN I SERPL-MCNC: <0.05 NG/ML
UROBILINOGEN UR QL STRIP.AUTO: 0.2 EU/DL (ref 0.2–1)
VENTRICULAR RATE, ECG03: 104 BPM
VLDLC SERPL CALC-MCNC: 31 MG/DL
WBC # BLD AUTO: 12.8 K/UL (ref 4.1–11.1)
WBC # BLD AUTO: 13.1 K/UL (ref 4.1–11.1)
WBC # BLD AUTO: 13.5 K/UL (ref 4.1–11.1)
WBC # BLD AUTO: 15.1 K/UL (ref 4.1–11.1)
WBC # BLD AUTO: 15.2 K/UL (ref 4.1–11.1)
WBC # BLD AUTO: 15.6 K/UL (ref 4.1–11.1)
WBC # BLD AUTO: 15.7 K/UL (ref 4.1–11.1)
WBC # BLD AUTO: 17.3 K/UL (ref 4.1–11.1)
WBC # BLD AUTO: 17.7 K/UL (ref 4.1–11.1)
WBC URNS QL MICRO: ABNORMAL /HPF (ref 0–4)

## 2021-01-01 PROCEDURE — 83735 ASSAY OF MAGNESIUM: CPT

## 2021-01-01 PROCEDURE — G0299 HHS/HOSPICE OF RN EA 15 MIN: HCPCS

## 2021-01-01 PROCEDURE — 74011250636 HC RX REV CODE- 250/636: Performed by: HOSPITALIST

## 2021-01-01 PROCEDURE — 80053 COMPREHEN METABOLIC PANEL: CPT

## 2021-01-01 PROCEDURE — 82962 GLUCOSE BLOOD TEST: CPT

## 2021-01-01 PROCEDURE — 74011636637 HC RX REV CODE- 636/637: Performed by: NURSE PRACTITIONER

## 2021-01-01 PROCEDURE — 74011000258 HC RX REV CODE- 258: Performed by: NURSE PRACTITIONER

## 2021-01-01 PROCEDURE — 81001 URINALYSIS AUTO W/SCOPE: CPT

## 2021-01-01 PROCEDURE — G0155 HHCP-SVS OF CSW,EA 15 MIN: HCPCS

## 2021-01-01 PROCEDURE — 80048 BASIC METABOLIC PNL TOTAL CA: CPT

## 2021-01-01 PROCEDURE — 74011250636 HC RX REV CODE- 250/636: Performed by: NURSE PRACTITIONER

## 2021-01-01 PROCEDURE — 84100 ASSAY OF PHOSPHORUS: CPT

## 2021-01-01 PROCEDURE — 74011250637 HC RX REV CODE- 250/637: Performed by: NURSE PRACTITIONER

## 2021-01-01 PROCEDURE — APPNB30 APP NON BILLABLE TIME 0-30 MINS: Performed by: NURSE PRACTITIONER

## 2021-01-01 PROCEDURE — 70496 CT ANGIOGRAPHY HEAD: CPT

## 2021-01-01 PROCEDURE — 74011636637 HC RX REV CODE- 636/637: Performed by: HOSPITALIST

## 2021-01-01 PROCEDURE — 94760 N-INVAS EAR/PLS OXIMETRY 1: CPT

## 2021-01-01 PROCEDURE — 83880 ASSAY OF NATRIURETIC PEPTIDE: CPT

## 2021-01-01 PROCEDURE — 74011250636 HC RX REV CODE- 250/636: Performed by: STUDENT IN AN ORGANIZED HEALTH CARE EDUCATION/TRAINING PROGRAM

## 2021-01-01 PROCEDURE — 65660000001 HC RM ICU INTERMED STEPDOWN

## 2021-01-01 PROCEDURE — 77010033678 HC OXYGEN DAILY

## 2021-01-01 PROCEDURE — 74011000258 HC RX REV CODE- 258: Performed by: HOSPITALIST

## 2021-01-01 PROCEDURE — 74011250637 HC RX REV CODE- 250/637: Performed by: HOSPITALIST

## 2021-01-01 PROCEDURE — 93306 TTE W/DOPPLER COMPLETE: CPT

## 2021-01-01 PROCEDURE — 70450 CT HEAD/BRAIN W/O DYE: CPT

## 2021-01-01 PROCEDURE — 83605 ASSAY OF LACTIC ACID: CPT

## 2021-01-01 PROCEDURE — 65610000006 HC RM INTENSIVE CARE

## 2021-01-01 PROCEDURE — 99233 SBSQ HOSP IP/OBS HIGH 50: CPT | Performed by: NURSE PRACTITIONER

## 2021-01-01 PROCEDURE — 99356 PR PROLONGED SVC I/P OR OBS SETTING 1ST HOUR: CPT | Performed by: NURSE PRACTITIONER

## 2021-01-01 PROCEDURE — 36415 COLL VENOUS BLD VENIPUNCTURE: CPT

## 2021-01-01 PROCEDURE — 71045 X-RAY EXAM CHEST 1 VIEW: CPT

## 2021-01-01 PROCEDURE — 96374 THER/PROPH/DIAG INJ IV PUSH: CPT

## 2021-01-01 PROCEDURE — 99232 SBSQ HOSP IP/OBS MODERATE 35: CPT | Performed by: PSYCHIATRY & NEUROLOGY

## 2021-01-01 PROCEDURE — 74011636637 HC RX REV CODE- 636/637: Performed by: FAMILY MEDICINE

## 2021-01-01 PROCEDURE — 74018 RADEX ABDOMEN 1 VIEW: CPT

## 2021-01-01 PROCEDURE — HOSPICE MEDICATION HC HH HOSPICE MEDICATION

## 2021-01-01 PROCEDURE — 85027 COMPLETE CBC AUTOMATED: CPT

## 2021-01-01 PROCEDURE — 0651 HSPC ROUTINE HOME CARE

## 2021-01-01 PROCEDURE — APPSS30 APP SPLIT SHARED TIME 16-30 MINUTES: Performed by: NURSE PRACTITIONER

## 2021-01-01 PROCEDURE — 93005 ELECTROCARDIOGRAM TRACING: CPT

## 2021-01-01 PROCEDURE — 99231 SBSQ HOSP IP/OBS SF/LOW 25: CPT | Performed by: NURSE PRACTITIONER

## 2021-01-01 PROCEDURE — 74011000250 HC RX REV CODE- 250: Performed by: NURSE PRACTITIONER

## 2021-01-01 PROCEDURE — 3336500001 HSPC ELECTION

## 2021-01-01 PROCEDURE — 87070 CULTURE OTHR SPECIMN AEROBIC: CPT

## 2021-01-01 PROCEDURE — 99223 1ST HOSP IP/OBS HIGH 75: CPT | Performed by: PSYCHIATRY & NEUROLOGY

## 2021-01-01 PROCEDURE — 96365 THER/PROPH/DIAG IV INF INIT: CPT

## 2021-01-01 PROCEDURE — 82803 BLOOD GASES ANY COMBINATION: CPT

## 2021-01-01 PROCEDURE — 74011000258 HC RX REV CODE- 258: Performed by: STUDENT IN AN ORGANIZED HEALTH CARE EDUCATION/TRAINING PROGRAM

## 2021-01-01 PROCEDURE — 87077 CULTURE AEROBIC IDENTIFY: CPT

## 2021-01-01 PROCEDURE — 87635 SARS-COV-2 COVID-19 AMP PRB: CPT

## 2021-01-01 PROCEDURE — 96367 TX/PROPH/DG ADDL SEQ IV INF: CPT

## 2021-01-01 PROCEDURE — 85025 COMPLETE CBC W/AUTO DIFF WBC: CPT

## 2021-01-01 PROCEDURE — 84484 ASSAY OF TROPONIN QUANT: CPT

## 2021-01-01 PROCEDURE — APPSS45 APP SPLIT SHARED TIME 31-45 MINUTES: Performed by: NURSE PRACTITIONER

## 2021-01-01 PROCEDURE — 70498 CT ANGIOGRAPHY NECK: CPT

## 2021-01-01 PROCEDURE — 74011250637 HC RX REV CODE- 250/637: Performed by: STUDENT IN AN ORGANIZED HEALTH CARE EDUCATION/TRAINING PROGRAM

## 2021-01-01 PROCEDURE — 99285 EMERGENCY DEPT VISIT HI MDM: CPT

## 2021-01-01 PROCEDURE — 77030040361 HC SLV COMPR DVT MDII -B

## 2021-01-01 PROCEDURE — 36600 WITHDRAWAL OF ARTERIAL BLOOD: CPT

## 2021-01-01 PROCEDURE — 96366 THER/PROPH/DIAG IV INF ADDON: CPT

## 2021-01-01 PROCEDURE — 87186 SC STD MICRODIL/AGAR DIL: CPT

## 2021-01-01 PROCEDURE — 2709999900 HC NON-CHARGEABLE SUPPLY

## 2021-01-01 PROCEDURE — 87040 BLOOD CULTURE FOR BACTERIA: CPT

## 2021-01-01 PROCEDURE — 70551 MRI BRAIN STEM W/O DYE: CPT

## 2021-01-01 PROCEDURE — 80061 LIPID PANEL: CPT

## 2021-01-01 PROCEDURE — 83036 HEMOGLOBIN GLYCOSYLATED A1C: CPT

## 2021-01-01 PROCEDURE — 74011000250 HC RX REV CODE- 250: Performed by: STUDENT IN AN ORGANIZED HEALTH CARE EDUCATION/TRAINING PROGRAM

## 2021-01-01 PROCEDURE — 84145 PROCALCITONIN (PCT): CPT

## 2021-01-01 PROCEDURE — 74011000636 HC RX REV CODE- 636: Performed by: RADIOLOGY

## 2021-01-01 PROCEDURE — 92610 EVALUATE SWALLOWING FUNCTION: CPT

## 2021-01-01 PROCEDURE — 99232 SBSQ HOSP IP/OBS MODERATE 35: CPT | Performed by: NURSE PRACTITIONER

## 2021-01-01 PROCEDURE — 99223 1ST HOSP IP/OBS HIGH 75: CPT | Performed by: NURSE PRACTITIONER

## 2021-01-01 PROCEDURE — 3331090004 HSPC SERVICE INTENSITY ADD-ON

## 2021-01-01 RX ORDER — LORAZEPAM 2 MG/ML
1 INJECTION INTRAMUSCULAR
Status: DISCONTINUED | OUTPATIENT
Start: 2021-01-01 | End: 2021-01-01 | Stop reason: HOSPADM

## 2021-01-01 RX ORDER — INSULIN GLARGINE 100 [IU]/ML
25 INJECTION, SOLUTION SUBCUTANEOUS DAILY
Status: DISCONTINUED | OUTPATIENT
Start: 2021-01-01 | End: 2021-01-01

## 2021-01-01 RX ORDER — FAMOTIDINE 20 MG/1
20 TABLET, FILM COATED ORAL DAILY
Status: DISCONTINUED | OUTPATIENT
Start: 2021-01-01 | End: 2021-01-01

## 2021-01-01 RX ORDER — GLIPIZIDE 10 MG/1
5 TABLET, FILM COATED, EXTENDED RELEASE ORAL DAILY
COMMUNITY
End: 2021-01-01

## 2021-01-01 RX ORDER — INSULIN GLARGINE 100 [IU]/ML
15 INJECTION, SOLUTION SUBCUTANEOUS DAILY
Status: DISCONTINUED | OUTPATIENT
Start: 2021-01-01 | End: 2021-01-01

## 2021-01-01 RX ORDER — ACETAMINOPHEN 325 MG/1
650 TABLET ORAL
Status: DISCONTINUED | OUTPATIENT
Start: 2021-01-01 | End: 2021-01-01

## 2021-01-01 RX ORDER — SODIUM,POTASSIUM PHOSPHATES 280-250MG
2 POWDER IN PACKET (EA) ORAL ONCE
Status: COMPLETED | OUTPATIENT
Start: 2021-01-01 | End: 2021-01-01

## 2021-01-01 RX ORDER — AMOXICILLIN 250 MG
1 CAPSULE ORAL 2 TIMES DAILY
Status: DISCONTINUED | OUTPATIENT
Start: 2021-01-01 | End: 2021-01-01

## 2021-01-01 RX ORDER — HYDRALAZINE HYDROCHLORIDE 50 MG/1
100 TABLET, FILM COATED ORAL 3 TIMES DAILY
Status: DISCONTINUED | OUTPATIENT
Start: 2021-01-01 | End: 2021-01-01

## 2021-01-01 RX ORDER — LABETALOL HYDROCHLORIDE 5 MG/ML
10 INJECTION, SOLUTION INTRAVENOUS
Status: DISCONTINUED | OUTPATIENT
Start: 2021-01-01 | End: 2021-01-01 | Stop reason: HOSPADM

## 2021-01-01 RX ORDER — HYDRALAZINE HYDROCHLORIDE 20 MG/ML
10 INJECTION INTRAMUSCULAR; INTRAVENOUS
Status: DISCONTINUED | OUTPATIENT
Start: 2021-01-01 | End: 2021-01-01

## 2021-01-01 RX ORDER — MORPHINE SULFATE 2 MG/ML
2 INJECTION, SOLUTION INTRAMUSCULAR; INTRAVENOUS
Status: DISCONTINUED | OUTPATIENT
Start: 2021-01-01 | End: 2021-01-01 | Stop reason: HOSPADM

## 2021-01-01 RX ORDER — INSULIN GLARGINE 100 [IU]/ML
5 INJECTION, SOLUTION SUBCUTANEOUS DAILY
Status: DISCONTINUED | OUTPATIENT
Start: 2021-01-01 | End: 2021-01-01

## 2021-01-01 RX ORDER — HYDRALAZINE HYDROCHLORIDE 20 MG/ML
10 INJECTION INTRAMUSCULAR; INTRAVENOUS ONCE
Status: COMPLETED | OUTPATIENT
Start: 2021-01-01 | End: 2021-01-01

## 2021-01-01 RX ORDER — MAGNESIUM SULFATE 100 %
4 CRYSTALS MISCELLANEOUS AS NEEDED
Status: DISCONTINUED | OUTPATIENT
Start: 2021-01-01 | End: 2021-01-01 | Stop reason: HOSPADM

## 2021-01-01 RX ORDER — HYDRALAZINE HYDROCHLORIDE 25 MG/1
25 TABLET, FILM COATED ORAL 3 TIMES DAILY
Status: DISCONTINUED | OUTPATIENT
Start: 2021-01-01 | End: 2021-01-01

## 2021-01-01 RX ORDER — HYDRALAZINE HYDROCHLORIDE 20 MG/ML
20 INJECTION INTRAMUSCULAR; INTRAVENOUS
Status: DISCONTINUED | OUTPATIENT
Start: 2021-01-01 | End: 2021-01-01

## 2021-01-01 RX ORDER — LISINOPRIL 20 MG/1
TABLET ORAL 2 TIMES DAILY
COMMUNITY
End: 2021-01-01

## 2021-01-01 RX ORDER — MORPHINE SULFATE 2 MG/ML
2 INJECTION, SOLUTION INTRAMUSCULAR; INTRAVENOUS
Status: DISCONTINUED | OUTPATIENT
Start: 2021-01-01 | End: 2021-01-01

## 2021-01-01 RX ORDER — INSULIN LISPRO 100 [IU]/ML
INJECTION, SOLUTION INTRAVENOUS; SUBCUTANEOUS EVERY 6 HOURS
Status: DISCONTINUED | OUTPATIENT
Start: 2021-01-01 | End: 2021-01-01

## 2021-01-01 RX ORDER — SODIUM CHLORIDE 0.9 % (FLUSH) 0.9 %
5-40 SYRINGE (ML) INJECTION EVERY 8 HOURS
Status: DISCONTINUED | OUTPATIENT
Start: 2021-01-01 | End: 2021-01-01 | Stop reason: HOSPADM

## 2021-01-01 RX ORDER — LISINOPRIL 20 MG/1
20 TABLET ORAL DAILY
Status: DISCONTINUED | OUTPATIENT
Start: 2021-01-01 | End: 2021-01-01

## 2021-01-01 RX ORDER — FACIAL-BODY WIPES
10 EACH TOPICAL DAILY PRN
Status: DISCONTINUED | OUTPATIENT
Start: 2021-01-01 | End: 2021-01-01 | Stop reason: HOSPADM

## 2021-01-01 RX ORDER — METRONIDAZOLE 500 MG/100ML
500 INJECTION, SOLUTION INTRAVENOUS EVERY 12 HOURS
Status: DISCONTINUED | OUTPATIENT
Start: 2021-01-01 | End: 2021-01-01

## 2021-01-01 RX ORDER — METFORMIN HYDROCHLORIDE 1000 MG/1
1000 TABLET ORAL 2 TIMES DAILY WITH MEALS
COMMUNITY
End: 2021-01-01

## 2021-01-01 RX ORDER — DEXTROSE 50 % IN WATER (D50W) INTRAVENOUS SYRINGE
12.5-25 AS NEEDED
Status: DISCONTINUED | OUTPATIENT
Start: 2021-01-01 | End: 2021-01-01 | Stop reason: HOSPADM

## 2021-01-01 RX ORDER — PROMETHAZINE HYDROCHLORIDE 25 MG/1
12.5 TABLET ORAL
Status: DISCONTINUED | OUTPATIENT
Start: 2021-01-01 | End: 2021-01-01 | Stop reason: HOSPADM

## 2021-01-01 RX ORDER — METOPROLOL TARTRATE 25 MG/1
25 TABLET, FILM COATED ORAL EVERY 12 HOURS
Status: DISCONTINUED | OUTPATIENT
Start: 2021-01-01 | End: 2021-01-01

## 2021-01-01 RX ORDER — HYDRALAZINE HYDROCHLORIDE 50 MG/1
50 TABLET, FILM COATED ORAL 3 TIMES DAILY
Status: DISCONTINUED | OUTPATIENT
Start: 2021-01-01 | End: 2021-01-01

## 2021-01-01 RX ORDER — ACETAMINOPHEN 650 MG/1
650 SUPPOSITORY RECTAL
Status: COMPLETED | OUTPATIENT
Start: 2021-01-01 | End: 2021-01-01

## 2021-01-01 RX ORDER — INSULIN LISPRO 100 [IU]/ML
INJECTION, SOLUTION INTRAVENOUS; SUBCUTANEOUS EVERY 4 HOURS
Status: DISCONTINUED | OUTPATIENT
Start: 2021-01-01 | End: 2021-01-01

## 2021-01-01 RX ORDER — HYDRALAZINE HYDROCHLORIDE 20 MG/ML
10 INJECTION INTRAMUSCULAR; INTRAVENOUS
Status: DISCONTINUED | OUTPATIENT
Start: 2021-01-01 | End: 2021-01-01 | Stop reason: HOSPADM

## 2021-01-01 RX ORDER — LABETALOL HYDROCHLORIDE 5 MG/ML
10 INJECTION, SOLUTION INTRAVENOUS
Status: COMPLETED | OUTPATIENT
Start: 2021-01-01 | End: 2021-01-01

## 2021-01-01 RX ORDER — HYDROCHLOROTHIAZIDE 12.5 MG/1
12.5 CAPSULE ORAL DAILY
COMMUNITY
End: 2021-01-01

## 2021-01-01 RX ORDER — MORPHINE SULFATE ORAL SOLUTION 10 MG/5ML
5 SOLUTION ORAL
Status: DISCONTINUED | OUTPATIENT
Start: 2021-01-01 | End: 2021-01-01 | Stop reason: HOSPADM

## 2021-01-01 RX ORDER — INSULIN GLARGINE 100 [IU]/ML
10 INJECTION, SOLUTION SUBCUTANEOUS ONCE
Status: COMPLETED | OUTPATIENT
Start: 2021-01-01 | End: 2021-01-01

## 2021-01-01 RX ORDER — INSULIN GLARGINE 100 [IU]/ML
20 INJECTION, SOLUTION SUBCUTANEOUS DAILY
Status: DISCONTINUED | OUTPATIENT
Start: 2021-01-01 | End: 2021-01-01

## 2021-01-01 RX ORDER — DEXTROSE 50 % IN WATER (D50W) INTRAVENOUS SYRINGE
12.5-25 AS NEEDED
Status: DISCONTINUED | OUTPATIENT
Start: 2021-01-01 | End: 2021-01-01 | Stop reason: SDUPTHER

## 2021-01-01 RX ORDER — ACETAMINOPHEN 650 MG/1
650 SUPPOSITORY RECTAL
Status: DISCONTINUED | OUTPATIENT
Start: 2021-01-01 | End: 2021-01-01 | Stop reason: HOSPADM

## 2021-01-01 RX ORDER — SODIUM CHLORIDE 0.9 % (FLUSH) 0.9 %
5-40 SYRINGE (ML) INJECTION AS NEEDED
Status: DISCONTINUED | OUTPATIENT
Start: 2021-01-01 | End: 2021-01-01 | Stop reason: HOSPADM

## 2021-01-01 RX ORDER — ONDANSETRON 2 MG/ML
4 INJECTION INTRAMUSCULAR; INTRAVENOUS
Status: DISCONTINUED | OUTPATIENT
Start: 2021-01-01 | End: 2021-01-01 | Stop reason: HOSPADM

## 2021-01-01 RX ADMIN — HYDRALAZINE HYDROCHLORIDE 10 MG: 20 INJECTION INTRAMUSCULAR; INTRAVENOUS at 06:40

## 2021-01-01 RX ADMIN — HYDRALAZINE HYDROCHLORIDE 10 MG: 20 INJECTION INTRAMUSCULAR; INTRAVENOUS at 17:18

## 2021-01-01 RX ADMIN — INSULIN LISPRO 5 UNITS: 100 INJECTION, SOLUTION INTRAVENOUS; SUBCUTANEOUS at 19:05

## 2021-01-01 RX ADMIN — LABETALOL HYDROCHLORIDE 10 MG: 5 INJECTION INTRAVENOUS at 14:49

## 2021-01-01 RX ADMIN — FAMOTIDINE 20 MG: 20 TABLET, FILM COATED ORAL at 08:12

## 2021-01-01 RX ADMIN — SODIUM CHLORIDE 12.5 MG/HR: 900 INJECTION, SOLUTION INTRAVENOUS at 17:58

## 2021-01-01 RX ADMIN — FAMOTIDINE 20 MG: 10 INJECTION, SOLUTION INTRAVENOUS at 08:28

## 2021-01-01 RX ADMIN — SODIUM CHLORIDE 10 MG/HR: 900 INJECTION, SOLUTION INTRAVENOUS at 12:04

## 2021-01-01 RX ADMIN — LABETALOL HYDROCHLORIDE 10 MG: 5 INJECTION INTRAVENOUS at 17:31

## 2021-01-01 RX ADMIN — LABETALOL HYDROCHLORIDE 10 MG: 5 INJECTION INTRAVENOUS at 03:39

## 2021-01-01 RX ADMIN — LABETALOL HYDROCHLORIDE 10 MG: 5 INJECTION INTRAVENOUS at 03:51

## 2021-01-01 RX ADMIN — HYDRALAZINE HYDROCHLORIDE 50 MG: 50 TABLET, FILM COATED ORAL at 23:47

## 2021-01-01 RX ADMIN — HYDRALAZINE HYDROCHLORIDE 25 MG: 25 TABLET, FILM COATED ORAL at 21:56

## 2021-01-01 RX ADMIN — Medication 10 ML: at 05:02

## 2021-01-01 RX ADMIN — ACETAMINOPHEN 650 MG: 325 TABLET ORAL at 13:49

## 2021-01-01 RX ADMIN — SODIUM CHLORIDE 5 MG/HR: 9 INJECTION, SOLUTION INTRAVENOUS at 04:32

## 2021-01-01 RX ADMIN — MORPHINE SULFATE 2 MG: 2 INJECTION, SOLUTION INTRAMUSCULAR; INTRAVENOUS at 04:34

## 2021-01-01 RX ADMIN — SODIUM CHLORIDE 5 MG/HR: 9 INJECTION, SOLUTION INTRAVENOUS at 15:05

## 2021-01-01 RX ADMIN — HYDRALAZINE HYDROCHLORIDE 50 MG: 50 TABLET, FILM COATED ORAL at 15:20

## 2021-01-01 RX ADMIN — HYDRALAZINE HYDROCHLORIDE 100 MG: 50 TABLET, FILM COATED ORAL at 21:59

## 2021-01-01 RX ADMIN — HYDRALAZINE HYDROCHLORIDE 100 MG: 50 TABLET, FILM COATED ORAL at 16:34

## 2021-01-01 RX ADMIN — LABETALOL HYDROCHLORIDE 10 MG: 5 INJECTION INTRAVENOUS at 07:02

## 2021-01-01 RX ADMIN — DOCUSATE SODIUM 50 MG AND SENNOSIDES 8.6 MG 1 TABLET: 8.6; 5 TABLET, FILM COATED ORAL at 23:47

## 2021-01-01 RX ADMIN — INSULIN LISPRO 2 UNITS: 100 INJECTION, SOLUTION INTRAVENOUS; SUBCUTANEOUS at 05:11

## 2021-01-01 RX ADMIN — SODIUM CHLORIDE 5 MG/HR: 900 INJECTION, SOLUTION INTRAVENOUS at 14:34

## 2021-01-01 RX ADMIN — INSULIN LISPRO 3 UNITS: 100 INJECTION, SOLUTION INTRAVENOUS; SUBCUTANEOUS at 23:33

## 2021-01-01 RX ADMIN — LISINOPRIL 20 MG: 20 TABLET ORAL at 20:26

## 2021-01-01 RX ADMIN — SODIUM CHLORIDE 7.5 MG/HR: 900 INJECTION, SOLUTION INTRAVENOUS at 19:32

## 2021-01-01 RX ADMIN — SODIUM CHLORIDE 15 MG/HR: 900 INJECTION, SOLUTION INTRAVENOUS at 22:22

## 2021-01-01 RX ADMIN — LABETALOL HYDROCHLORIDE 10 MG: 5 INJECTION INTRAVENOUS at 15:20

## 2021-01-01 RX ADMIN — ACETAMINOPHEN 650 MG: 650 SUPPOSITORY RECTAL at 11:00

## 2021-01-01 RX ADMIN — ACETAMINOPHEN 650 MG: 325 TABLET ORAL at 09:39

## 2021-01-01 RX ADMIN — HYDRALAZINE HYDROCHLORIDE 25 MG: 25 TABLET, FILM COATED ORAL at 15:35

## 2021-01-01 RX ADMIN — INSULIN LISPRO 3 UNITS: 100 INJECTION, SOLUTION INTRAVENOUS; SUBCUTANEOUS at 21:47

## 2021-01-01 RX ADMIN — Medication 10 ML: at 14:50

## 2021-01-01 RX ADMIN — ACETAMINOPHEN 650 MG: 325 TABLET ORAL at 20:20

## 2021-01-01 RX ADMIN — LABETALOL HYDROCHLORIDE 10 MG: 5 INJECTION INTRAVENOUS at 19:06

## 2021-01-01 RX ADMIN — LEVETIRACETAM 500 MG: 100 SOLUTION ORAL at 16:26

## 2021-01-01 RX ADMIN — METRONIDAZOLE 500 MG: 500 INJECTION, SOLUTION INTRAVENOUS at 01:17

## 2021-01-01 RX ADMIN — SODIUM CHLORIDE 7.5 MG/HR: 900 INJECTION, SOLUTION INTRAVENOUS at 10:07

## 2021-01-01 RX ADMIN — HYDRALAZINE HYDROCHLORIDE 20 MG: 20 INJECTION INTRAMUSCULAR; INTRAVENOUS at 23:22

## 2021-01-01 RX ADMIN — Medication 10 ML: at 21:45

## 2021-01-01 RX ADMIN — INSULIN LISPRO 3 UNITS: 100 INJECTION, SOLUTION INTRAVENOUS; SUBCUTANEOUS at 17:29

## 2021-01-01 RX ADMIN — FAMOTIDINE 20 MG: 10 INJECTION, SOLUTION INTRAVENOUS at 08:23

## 2021-01-01 RX ADMIN — INSULIN GLARGINE 25 UNITS: 100 INJECTION, SOLUTION SUBCUTANEOUS at 09:37

## 2021-01-01 RX ADMIN — INSULIN LISPRO 7 UNITS: 100 INJECTION, SOLUTION INTRAVENOUS; SUBCUTANEOUS at 02:56

## 2021-01-01 RX ADMIN — INSULIN LISPRO 3 UNITS: 100 INJECTION, SOLUTION INTRAVENOUS; SUBCUTANEOUS at 00:00

## 2021-01-01 RX ADMIN — SODIUM CHLORIDE 10 MG/HR: 900 INJECTION, SOLUTION INTRAVENOUS at 03:43

## 2021-01-01 RX ADMIN — SODIUM CHLORIDE 5 MG/HR: 9 INJECTION, SOLUTION INTRAVENOUS at 07:16

## 2021-01-01 RX ADMIN — LABETALOL HYDROCHLORIDE 10 MG: 5 INJECTION INTRAVENOUS at 18:52

## 2021-01-01 RX ADMIN — SODIUM CHLORIDE 5 MG/HR: 9 INJECTION, SOLUTION INTRAVENOUS at 03:33

## 2021-01-01 RX ADMIN — LISINOPRIL 20 MG: 20 TABLET ORAL at 21:59

## 2021-01-01 RX ADMIN — SODIUM CHLORIDE 5 MG/HR: 9 INJECTION, SOLUTION INTRAVENOUS at 19:58

## 2021-01-01 RX ADMIN — HYDRALAZINE HYDROCHLORIDE 100 MG: 50 TABLET, FILM COATED ORAL at 16:21

## 2021-01-01 RX ADMIN — LABETALOL HYDROCHLORIDE 10 MG: 5 INJECTION INTRAVENOUS at 11:34

## 2021-01-01 RX ADMIN — DOCUSATE SODIUM 50 MG AND SENNOSIDES 8.6 MG 1 TABLET: 8.6; 5 TABLET, FILM COATED ORAL at 09:24

## 2021-01-01 RX ADMIN — DOCUSATE SODIUM 50 MG AND SENNOSIDES 8.6 MG 1 TABLET: 8.6; 5 TABLET, FILM COATED ORAL at 08:56

## 2021-01-01 RX ADMIN — LABETALOL HYDROCHLORIDE 10 MG: 5 INJECTION INTRAVENOUS at 20:18

## 2021-01-01 RX ADMIN — HYDRALAZINE HYDROCHLORIDE 20 MG: 20 INJECTION INTRAMUSCULAR; INTRAVENOUS at 08:15

## 2021-01-01 RX ADMIN — HYDRALAZINE HYDROCHLORIDE 50 MG: 50 TABLET, FILM COATED ORAL at 21:04

## 2021-01-01 RX ADMIN — LISINOPRIL 20 MG: 20 TABLET ORAL at 21:04

## 2021-01-01 RX ADMIN — INSULIN LISPRO 2 UNITS: 100 INJECTION, SOLUTION INTRAVENOUS; SUBCUTANEOUS at 21:22

## 2021-01-01 RX ADMIN — DOCUSATE SODIUM 50 MG AND SENNOSIDES 8.6 MG 1 TABLET: 8.6; 5 TABLET, FILM COATED ORAL at 21:45

## 2021-01-01 RX ADMIN — INSULIN LISPRO 5 UNITS: 100 INJECTION, SOLUTION INTRAVENOUS; SUBCUTANEOUS at 12:28

## 2021-01-01 RX ADMIN — METOPROLOL TARTRATE 25 MG: 25 TABLET, FILM COATED ORAL at 08:23

## 2021-01-01 RX ADMIN — LEVETIRACETAM 500 MG: 100 SOLUTION ORAL at 04:31

## 2021-01-01 RX ADMIN — INSULIN LISPRO 3 UNITS: 100 INJECTION, SOLUTION INTRAVENOUS; SUBCUTANEOUS at 11:26

## 2021-01-01 RX ADMIN — LABETALOL HYDROCHLORIDE 10 MG: 5 INJECTION INTRAVENOUS at 11:00

## 2021-01-01 RX ADMIN — LABETALOL HYDROCHLORIDE 10 MG: 5 INJECTION INTRAVENOUS at 18:01

## 2021-01-01 RX ADMIN — LEVETIRACETAM 500 MG: 100 SOLUTION ORAL at 03:41

## 2021-01-01 RX ADMIN — SODIUM CHLORIDE 7.5 MG/HR: 900 INJECTION, SOLUTION INTRAVENOUS at 08:47

## 2021-01-01 RX ADMIN — ACETAMINOPHEN 650 MG: 650 SUPPOSITORY RECTAL at 22:28

## 2021-01-01 RX ADMIN — CEFTRIAXONE SODIUM 2 G: 2 INJECTION, POWDER, FOR SOLUTION INTRAMUSCULAR; INTRAVENOUS at 12:29

## 2021-01-01 RX ADMIN — METRONIDAZOLE 500 MG: 500 INJECTION, SOLUTION INTRAVENOUS at 01:22

## 2021-01-01 RX ADMIN — FAMOTIDINE 20 MG: 20 TABLET, FILM COATED ORAL at 08:04

## 2021-01-01 RX ADMIN — Medication 10 ML: at 01:22

## 2021-01-01 RX ADMIN — LEVETIRACETAM 500 MG: 100 INJECTION, SOLUTION INTRAVENOUS at 03:09

## 2021-01-01 RX ADMIN — INSULIN GLARGINE 20 UNITS: 100 INJECTION, SOLUTION SUBCUTANEOUS at 08:12

## 2021-01-01 RX ADMIN — INSULIN LISPRO 5 UNITS: 100 INJECTION, SOLUTION INTRAVENOUS; SUBCUTANEOUS at 06:20

## 2021-01-01 RX ADMIN — DOCUSATE SODIUM 50 MG AND SENNOSIDES 8.6 MG 1 TABLET: 8.6; 5 TABLET, FILM COATED ORAL at 21:35

## 2021-01-01 RX ADMIN — METOPROLOL TARTRATE 25 MG: 25 TABLET, FILM COATED ORAL at 09:38

## 2021-01-01 RX ADMIN — POTASSIUM BICARBONATE 20 MEQ: 782 TABLET, EFFERVESCENT ORAL at 06:11

## 2021-01-01 RX ADMIN — SODIUM CHLORIDE 5 MG/HR: 9 INJECTION, SOLUTION INTRAVENOUS at 08:04

## 2021-01-01 RX ADMIN — LEVETIRACETAM 500 MG: 100 SOLUTION ORAL at 16:10

## 2021-01-01 RX ADMIN — SODIUM CHLORIDE 10 MG/HR: 900 INJECTION, SOLUTION INTRAVENOUS at 15:34

## 2021-01-01 RX ADMIN — INSULIN LISPRO 3 UNITS: 100 INJECTION, SOLUTION INTRAVENOUS; SUBCUTANEOUS at 17:18

## 2021-01-01 RX ADMIN — HYDRALAZINE HYDROCHLORIDE 25 MG: 25 TABLET, FILM COATED ORAL at 16:14

## 2021-01-01 RX ADMIN — POTASSIUM & SODIUM PHOSPHATES POWDER PACK 280-160-250 MG 2 PACKET: 280-160-250 PACK at 12:08

## 2021-01-01 RX ADMIN — LEVETIRACETAM 500 MG: 100 SOLUTION ORAL at 04:24

## 2021-01-01 RX ADMIN — POTASSIUM BICARBONATE 40 MEQ: 782 TABLET, EFFERVESCENT ORAL at 08:23

## 2021-01-01 RX ADMIN — INSULIN LISPRO 3 UNITS: 100 INJECTION, SOLUTION INTRAVENOUS; SUBCUTANEOUS at 13:37

## 2021-01-01 RX ADMIN — SODIUM CHLORIDE 5 MG/HR: 900 INJECTION, SOLUTION INTRAVENOUS at 00:21

## 2021-01-01 RX ADMIN — ACETAMINOPHEN 650 MG: 325 TABLET ORAL at 18:05

## 2021-01-01 RX ADMIN — INSULIN GLARGINE 20 UNITS: 100 INJECTION, SOLUTION SUBCUTANEOUS at 09:23

## 2021-01-01 RX ADMIN — METRONIDAZOLE 500 MG: 500 INJECTION, SOLUTION INTRAVENOUS at 00:37

## 2021-01-01 RX ADMIN — INSULIN GLARGINE 25 UNITS: 100 INJECTION, SOLUTION SUBCUTANEOUS at 09:16

## 2021-01-01 RX ADMIN — HYDRALAZINE HYDROCHLORIDE 10 MG: 20 INJECTION INTRAMUSCULAR; INTRAVENOUS at 12:32

## 2021-01-01 RX ADMIN — INSULIN LISPRO 3 UNITS: 100 INJECTION, SOLUTION INTRAVENOUS; SUBCUTANEOUS at 17:13

## 2021-01-01 RX ADMIN — FAMOTIDINE 20 MG: 20 TABLET, FILM COATED ORAL at 09:39

## 2021-01-01 RX ADMIN — INSULIN LISPRO 3 UNITS: 100 INJECTION, SOLUTION INTRAVENOUS; SUBCUTANEOUS at 02:57

## 2021-01-01 RX ADMIN — HYDRALAZINE HYDROCHLORIDE 25 MG: 25 TABLET, FILM COATED ORAL at 08:04

## 2021-01-01 RX ADMIN — Medication 10 ML: at 06:14

## 2021-01-01 RX ADMIN — METOPROLOL TARTRATE 25 MG: 25 TABLET, FILM COATED ORAL at 21:59

## 2021-01-01 RX ADMIN — Medication 10 ML: at 06:00

## 2021-01-01 RX ADMIN — INSULIN LISPRO 5 UNITS: 100 INJECTION, SOLUTION INTRAVENOUS; SUBCUTANEOUS at 12:42

## 2021-01-01 RX ADMIN — SODIUM CHLORIDE 5 MG/HR: 900 INJECTION, SOLUTION INTRAVENOUS at 12:21

## 2021-01-01 RX ADMIN — HYDRALAZINE HYDROCHLORIDE 50 MG: 50 TABLET, FILM COATED ORAL at 08:56

## 2021-01-01 RX ADMIN — HYDRALAZINE HYDROCHLORIDE 10 MG: 20 INJECTION INTRAMUSCULAR; INTRAVENOUS at 04:32

## 2021-01-01 RX ADMIN — ACETAMINOPHEN 650 MG: 325 TABLET ORAL at 01:19

## 2021-01-01 RX ADMIN — HYDRALAZINE HYDROCHLORIDE 100 MG: 50 TABLET, FILM COATED ORAL at 09:40

## 2021-01-01 RX ADMIN — METRONIDAZOLE 500 MG: 500 INJECTION, SOLUTION INTRAVENOUS at 13:55

## 2021-01-01 RX ADMIN — LEVETIRACETAM 500 MG: 100 INJECTION, SOLUTION INTRAVENOUS at 03:17

## 2021-01-01 RX ADMIN — INSULIN LISPRO 3 UNITS: 100 INJECTION, SOLUTION INTRAVENOUS; SUBCUTANEOUS at 14:49

## 2021-01-01 RX ADMIN — HYDRALAZINE HYDROCHLORIDE 10 MG: 20 INJECTION INTRAMUSCULAR; INTRAVENOUS at 11:25

## 2021-01-01 RX ADMIN — SODIUM CHLORIDE 10 MG/HR: 900 INJECTION, SOLUTION INTRAVENOUS at 13:03

## 2021-01-01 RX ADMIN — INSULIN LISPRO 2 UNITS: 100 INJECTION, SOLUTION INTRAVENOUS; SUBCUTANEOUS at 00:37

## 2021-01-01 RX ADMIN — INSULIN LISPRO 3 UNITS: 100 INJECTION, SOLUTION INTRAVENOUS; SUBCUTANEOUS at 06:21

## 2021-01-01 RX ADMIN — HYDRALAZINE HYDROCHLORIDE 50 MG: 50 TABLET, FILM COATED ORAL at 16:26

## 2021-01-01 RX ADMIN — METOPROLOL TARTRATE 25 MG: 25 TABLET, FILM COATED ORAL at 08:55

## 2021-01-01 RX ADMIN — MORPHINE SULFATE 2 MG: 2 INJECTION, SOLUTION INTRAMUSCULAR; INTRAVENOUS at 16:23

## 2021-01-01 RX ADMIN — LABETALOL HYDROCHLORIDE 10 MG: 5 INJECTION INTRAVENOUS at 01:17

## 2021-01-01 RX ADMIN — INSULIN LISPRO 3 UNITS: 100 INJECTION, SOLUTION INTRAVENOUS; SUBCUTANEOUS at 00:07

## 2021-01-01 RX ADMIN — ACETAMINOPHEN 650 MG: 325 TABLET ORAL at 00:12

## 2021-01-01 RX ADMIN — HYDRALAZINE HYDROCHLORIDE 50 MG: 50 TABLET, FILM COATED ORAL at 08:24

## 2021-01-01 RX ADMIN — Medication 10 ML: at 06:53

## 2021-01-01 RX ADMIN — INSULIN LISPRO 3 UNITS: 100 INJECTION, SOLUTION INTRAVENOUS; SUBCUTANEOUS at 11:27

## 2021-01-01 RX ADMIN — SODIUM CHLORIDE 10 MG/HR: 900 INJECTION, SOLUTION INTRAVENOUS at 00:49

## 2021-01-01 RX ADMIN — LEVETIRACETAM 500 MG: 100 INJECTION, SOLUTION INTRAVENOUS at 14:47

## 2021-01-01 RX ADMIN — METRONIDAZOLE 500 MG: 500 INJECTION, SOLUTION INTRAVENOUS at 13:41

## 2021-01-01 RX ADMIN — CEFTRIAXONE SODIUM 2 G: 2 INJECTION, POWDER, FOR SOLUTION INTRAMUSCULAR; INTRAVENOUS at 12:02

## 2021-01-01 RX ADMIN — HYDRALAZINE HYDROCHLORIDE 25 MG: 25 TABLET, FILM COATED ORAL at 08:24

## 2021-01-01 RX ADMIN — Medication 10 ML: at 13:10

## 2021-01-01 RX ADMIN — LABETALOL HYDROCHLORIDE 10 MG: 5 INJECTION INTRAVENOUS at 04:23

## 2021-01-01 RX ADMIN — INSULIN LISPRO 5 UNITS: 100 INJECTION, SOLUTION INTRAVENOUS; SUBCUTANEOUS at 06:26

## 2021-01-01 RX ADMIN — HYDRALAZINE HYDROCHLORIDE 10 MG: 20 INJECTION INTRAMUSCULAR; INTRAVENOUS at 15:14

## 2021-01-01 RX ADMIN — SODIUM CHLORIDE 2.5 MG/HR: 900 INJECTION, SOLUTION INTRAVENOUS at 19:04

## 2021-01-01 RX ADMIN — CEFTRIAXONE SODIUM 2 G: 2 INJECTION, POWDER, FOR SOLUTION INTRAMUSCULAR; INTRAVENOUS at 12:15

## 2021-01-01 RX ADMIN — DOCUSATE SODIUM 50 MG AND SENNOSIDES 8.6 MG 1 TABLET: 8.6; 5 TABLET, FILM COATED ORAL at 08:24

## 2021-01-01 RX ADMIN — DOCUSATE SODIUM 50 MG AND SENNOSIDES 8.6 MG 1 TABLET: 8.6; 5 TABLET, FILM COATED ORAL at 21:59

## 2021-01-01 RX ADMIN — SODIUM CHLORIDE 10 MG/HR: 900 INJECTION, SOLUTION INTRAVENOUS at 16:38

## 2021-01-01 RX ADMIN — LISINOPRIL 20 MG: 20 TABLET ORAL at 23:47

## 2021-01-01 RX ADMIN — HYDRALAZINE HYDROCHLORIDE 50 MG: 50 TABLET, FILM COATED ORAL at 08:12

## 2021-01-01 RX ADMIN — INSULIN GLARGINE 25 UNITS: 100 INJECTION, SOLUTION SUBCUTANEOUS at 08:55

## 2021-01-01 RX ADMIN — Medication 10 ML: at 21:23

## 2021-01-01 RX ADMIN — HYDRALAZINE HYDROCHLORIDE 50 MG: 50 TABLET, FILM COATED ORAL at 09:24

## 2021-01-01 RX ADMIN — HYDRALAZINE HYDROCHLORIDE 50 MG: 50 TABLET, FILM COATED ORAL at 16:10

## 2021-01-01 RX ADMIN — DOCUSATE SODIUM 50 MG AND SENNOSIDES 8.6 MG 1 TABLET: 8.6; 5 TABLET, FILM COATED ORAL at 20:20

## 2021-01-01 RX ADMIN — SODIUM CHLORIDE 5 MG/HR: 900 INJECTION, SOLUTION INTRAVENOUS at 05:12

## 2021-01-01 RX ADMIN — SODIUM CHLORIDE 7.5 MG/HR: 900 INJECTION, SOLUTION INTRAVENOUS at 04:18

## 2021-01-01 RX ADMIN — INSULIN LISPRO 3 UNITS: 100 INJECTION, SOLUTION INTRAVENOUS; SUBCUTANEOUS at 06:24

## 2021-01-01 RX ADMIN — Medication 10 ML: at 05:03

## 2021-01-01 RX ADMIN — Medication 10 ML: at 21:54

## 2021-01-01 RX ADMIN — HYDRALAZINE HYDROCHLORIDE 25 MG: 25 TABLET, FILM COATED ORAL at 16:00

## 2021-01-01 RX ADMIN — LABETALOL HYDROCHLORIDE 10 MG: 5 INJECTION INTRAVENOUS at 10:49

## 2021-01-01 RX ADMIN — FAMOTIDINE 20 MG: 20 TABLET, FILM COATED ORAL at 09:23

## 2021-01-01 RX ADMIN — SODIUM CHLORIDE 12.5 MG/HR: 900 INJECTION, SOLUTION INTRAVENOUS at 14:35

## 2021-01-01 RX ADMIN — DOCUSATE SODIUM 50 MG AND SENNOSIDES 8.6 MG 1 TABLET: 8.6; 5 TABLET, FILM COATED ORAL at 21:04

## 2021-01-01 RX ADMIN — MORPHINE SULFATE 2 MG: 2 INJECTION, SOLUTION INTRAMUSCULAR; INTRAVENOUS at 13:50

## 2021-01-01 RX ADMIN — ACETAMINOPHEN 650 MG: 325 TABLET ORAL at 04:13

## 2021-01-01 RX ADMIN — CEFTRIAXONE SODIUM 2 G: 2 INJECTION, POWDER, FOR SOLUTION INTRAMUSCULAR; INTRAVENOUS at 12:42

## 2021-01-01 RX ADMIN — INSULIN LISPRO 7 UNITS: 100 INJECTION, SOLUTION INTRAVENOUS; SUBCUTANEOUS at 01:22

## 2021-01-01 RX ADMIN — INSULIN GLARGINE 25 UNITS: 100 INJECTION, SOLUTION SUBCUTANEOUS at 08:22

## 2021-01-01 RX ADMIN — HYDRALAZINE HYDROCHLORIDE 50 MG: 50 TABLET, FILM COATED ORAL at 21:35

## 2021-01-01 RX ADMIN — LEVETIRACETAM 500 MG: 100 SOLUTION ORAL at 15:20

## 2021-01-01 RX ADMIN — LEVETIRACETAM 500 MG: 100 SOLUTION ORAL at 16:34

## 2021-01-01 RX ADMIN — Medication 10 ML: at 15:12

## 2021-01-01 RX ADMIN — INSULIN LISPRO 7 UNITS: 100 INJECTION, SOLUTION INTRAVENOUS; SUBCUTANEOUS at 12:41

## 2021-01-01 RX ADMIN — SODIUM CHLORIDE 5 MG/HR: 900 INJECTION, SOLUTION INTRAVENOUS at 08:33

## 2021-01-01 RX ADMIN — LEVETIRACETAM 500 MG: 100 SOLUTION ORAL at 15:35

## 2021-01-01 RX ADMIN — Medication 10 ML: at 07:10

## 2021-01-01 RX ADMIN — SODIUM CHLORIDE 500 ML: 9 INJECTION, SOLUTION INTRAVENOUS at 22:38

## 2021-01-01 RX ADMIN — SODIUM CHLORIDE 5 MG/HR: 900 INJECTION, SOLUTION INTRAVENOUS at 19:05

## 2021-01-01 RX ADMIN — INSULIN LISPRO 5 UNITS: 100 INJECTION, SOLUTION INTRAVENOUS; SUBCUTANEOUS at 12:03

## 2021-01-01 RX ADMIN — HYDRALAZINE HYDROCHLORIDE 10 MG: 20 INJECTION INTRAMUSCULAR; INTRAVENOUS at 14:23

## 2021-01-01 RX ADMIN — Medication 10 ML: at 23:14

## 2021-01-01 RX ADMIN — LABETALOL HYDROCHLORIDE 10 MG: 5 INJECTION INTRAVENOUS at 03:10

## 2021-01-01 RX ADMIN — Medication 10 ML: at 15:11

## 2021-01-01 RX ADMIN — Medication 10 ML: at 13:04

## 2021-01-01 RX ADMIN — METOPROLOL TARTRATE 25 MG: 25 TABLET, FILM COATED ORAL at 23:47

## 2021-01-01 RX ADMIN — HYDRALAZINE HYDROCHLORIDE 50 MG: 50 TABLET, FILM COATED ORAL at 21:45

## 2021-01-01 RX ADMIN — METOPROLOL TARTRATE 25 MG: 25 TABLET, FILM COATED ORAL at 21:45

## 2021-01-01 RX ADMIN — LABETALOL HYDROCHLORIDE 10 MG: 5 INJECTION INTRAVENOUS at 16:20

## 2021-01-01 RX ADMIN — POTASSIUM BICARBONATE 40 MEQ: 782 TABLET, EFFERVESCENT ORAL at 16:26

## 2021-01-01 RX ADMIN — Medication 10 ML: at 06:03

## 2021-01-01 RX ADMIN — ACETAMINOPHEN 650 MG: 325 TABLET ORAL at 04:17

## 2021-01-01 RX ADMIN — LISINOPRIL 20 MG: 20 TABLET ORAL at 21:45

## 2021-01-01 RX ADMIN — LEVETIRACETAM 500 MG: 100 SOLUTION ORAL at 04:11

## 2021-01-01 RX ADMIN — Medication 10 ML: at 12:35

## 2021-01-01 RX ADMIN — SODIUM CHLORIDE 10 MG/HR: 900 INJECTION, SOLUTION INTRAVENOUS at 06:16

## 2021-01-01 RX ADMIN — INSULIN LISPRO 3 UNITS: 100 INJECTION, SOLUTION INTRAVENOUS; SUBCUTANEOUS at 06:00

## 2021-01-01 RX ADMIN — LABETALOL HYDROCHLORIDE 10 MG: 5 INJECTION INTRAVENOUS at 13:31

## 2021-01-01 RX ADMIN — INSULIN GLARGINE 10 UNITS: 100 INJECTION, SOLUTION SUBCUTANEOUS at 12:15

## 2021-01-01 RX ADMIN — Medication 10 ML: at 21:56

## 2021-01-01 RX ADMIN — DOCUSATE SODIUM 50 MG AND SENNOSIDES 8.6 MG 1 TABLET: 8.6; 5 TABLET, FILM COATED ORAL at 08:04

## 2021-01-01 RX ADMIN — LEVETIRACETAM 500 MG: 100 INJECTION, SOLUTION INTRAVENOUS at 12:06

## 2021-01-01 RX ADMIN — LABETALOL HYDROCHLORIDE 10 MG: 5 INJECTION INTRAVENOUS at 04:36

## 2021-01-01 RX ADMIN — FAMOTIDINE 20 MG: 20 TABLET, FILM COATED ORAL at 08:56

## 2021-01-01 RX ADMIN — Medication 10 ML: at 23:47

## 2021-01-01 RX ADMIN — LEVETIRACETAM 500 MG: 100 SOLUTION ORAL at 16:21

## 2021-01-01 RX ADMIN — INSULIN LISPRO 3 UNITS: 100 INJECTION, SOLUTION INTRAVENOUS; SUBCUTANEOUS at 06:05

## 2021-01-01 RX ADMIN — LEVETIRACETAM 500 MG: 100 SOLUTION ORAL at 03:43

## 2021-01-01 RX ADMIN — INSULIN LISPRO 5 UNITS: 100 INJECTION, SOLUTION INTRAVENOUS; SUBCUTANEOUS at 18:42

## 2021-01-01 RX ADMIN — LEVETIRACETAM 500 MG: 100 INJECTION, SOLUTION INTRAVENOUS at 14:45

## 2021-01-01 RX ADMIN — ACETAMINOPHEN 650 MG: 325 TABLET ORAL at 06:40

## 2021-01-01 RX ADMIN — LABETALOL HYDROCHLORIDE 10 MG: 5 INJECTION INTRAVENOUS at 02:09

## 2021-01-01 RX ADMIN — LISINOPRIL 20 MG: 20 TABLET ORAL at 20:07

## 2021-01-01 RX ADMIN — Medication 10 ML: at 02:12

## 2021-01-01 RX ADMIN — METRONIDAZOLE 500 MG: 500 INJECTION, SOLUTION INTRAVENOUS at 13:37

## 2021-01-01 RX ADMIN — Medication 10 ML: at 13:54

## 2021-01-01 RX ADMIN — Medication 10 ML: at 22:50

## 2021-01-01 RX ADMIN — LABETALOL HYDROCHLORIDE 10 MG: 5 INJECTION INTRAVENOUS at 06:03

## 2021-01-01 RX ADMIN — DOCUSATE SODIUM 50 MG AND SENNOSIDES 8.6 MG 1 TABLET: 8.6; 5 TABLET, FILM COATED ORAL at 20:07

## 2021-01-01 RX ADMIN — LEVETIRACETAM 500 MG: 100 SOLUTION ORAL at 04:43

## 2021-01-01 RX ADMIN — SODIUM CHLORIDE 10 MG/HR: 900 INJECTION, SOLUTION INTRAVENOUS at 19:35

## 2021-01-01 RX ADMIN — HYDRALAZINE HYDROCHLORIDE 25 MG: 25 TABLET, FILM COATED ORAL at 21:14

## 2021-01-01 RX ADMIN — INSULIN LISPRO 3 UNITS: 100 INJECTION, SOLUTION INTRAVENOUS; SUBCUTANEOUS at 13:06

## 2021-01-01 RX ADMIN — ACETAMINOPHEN 650 MG: 325 TABLET ORAL at 11:26

## 2021-01-01 RX ADMIN — LABETALOL HYDROCHLORIDE 10 MG: 5 INJECTION INTRAVENOUS at 03:03

## 2021-01-01 RX ADMIN — POTASSIUM BICARBONATE 40 MEQ: 782 TABLET, EFFERVESCENT ORAL at 08:56

## 2021-01-01 RX ADMIN — SODIUM CHLORIDE 5 MG/HR: 9 INJECTION, SOLUTION INTRAVENOUS at 04:02

## 2021-01-01 RX ADMIN — IOPAMIDOL 100 ML: 755 INJECTION, SOLUTION INTRAVENOUS at 02:51

## 2021-01-01 RX ADMIN — LISINOPRIL 20 MG: 20 TABLET ORAL at 21:35

## 2021-01-01 RX ADMIN — CEFTRIAXONE SODIUM 2 G: 2 INJECTION, POWDER, FOR SOLUTION INTRAMUSCULAR; INTRAVENOUS at 11:58

## 2021-01-01 RX ADMIN — Medication 10 ML: at 04:13

## 2021-01-01 RX ADMIN — CEFEPIME HYDROCHLORIDE 2 G: 2 INJECTION, POWDER, FOR SOLUTION INTRAVENOUS at 22:30

## 2021-01-01 RX ADMIN — CEFTRIAXONE SODIUM 2 G: 2 INJECTION, POWDER, FOR SOLUTION INTRAMUSCULAR; INTRAVENOUS at 12:30

## 2021-01-01 RX ADMIN — DOCUSATE SODIUM 50 MG AND SENNOSIDES 8.6 MG 1 TABLET: 8.6; 5 TABLET, FILM COATED ORAL at 08:11

## 2021-01-01 RX ADMIN — METRONIDAZOLE 500 MG: 500 INJECTION, SOLUTION INTRAVENOUS at 12:42

## 2021-01-01 RX ADMIN — INSULIN LISPRO 3 UNITS: 100 INJECTION, SOLUTION INTRAVENOUS; SUBCUTANEOUS at 18:27

## 2021-01-01 RX ADMIN — SODIUM CHLORIDE 5 MG/HR: 900 INJECTION, SOLUTION INTRAVENOUS at 18:18

## 2021-01-01 RX ADMIN — FAMOTIDINE 20 MG: 20 TABLET, FILM COATED ORAL at 08:23

## 2021-01-01 RX ADMIN — POTASSIUM BICARBONATE 40 MEQ: 782 TABLET, EFFERVESCENT ORAL at 09:40

## 2021-01-01 RX ADMIN — SODIUM CHLORIDE 10 MG/HR: 900 INJECTION, SOLUTION INTRAVENOUS at 09:20

## 2021-01-01 RX ADMIN — Medication 10 ML: at 13:07

## 2021-01-01 RX ADMIN — Medication 10 ML: at 14:24

## 2021-01-01 RX ADMIN — INSULIN GLARGINE 5 UNITS: 100 INJECTION, SOLUTION SUBCUTANEOUS at 08:04

## 2021-01-01 RX ADMIN — INSULIN GLARGINE 5 UNITS: 100 INJECTION, SOLUTION SUBCUTANEOUS at 12:08

## 2021-01-01 RX ADMIN — SODIUM CHLORIDE 10 MG/HR: 900 INJECTION, SOLUTION INTRAVENOUS at 17:18

## 2021-01-01 RX ADMIN — INSULIN LISPRO 5 UNITS: 100 INJECTION, SOLUTION INTRAVENOUS; SUBCUTANEOUS at 12:29

## 2021-01-01 RX ADMIN — INSULIN LISPRO 5 UNITS: 100 INJECTION, SOLUTION INTRAVENOUS; SUBCUTANEOUS at 06:53

## 2021-01-01 RX ADMIN — SODIUM CHLORIDE 10 MG/HR: 900 INJECTION, SOLUTION INTRAVENOUS at 22:44

## 2021-04-07 PROBLEM — I61.0 BASAL GANGLIA HEMORRHAGE (HCC): Status: ACTIVE | Noted: 2021-01-01

## 2021-04-07 NOTE — PROGRESS NOTES
Called by ER at [facility] basal ganglia hemorrhage with extension into the left lateral ventricle. Appears to originate in the left caudate with history of hypertension and prior cerebellar stroke .  Generally do not operate on dominant hemisphere hypertensive basal ganglia hemorrhages  For now recommend ICU admission with supportive care and will follow as needed   Basal cisterns open and age-related cerebral atrophy allows for some room for this hemorrhage without much mass effect

## 2021-04-07 NOTE — PROGRESS NOTES
Reviewed chart for transitions of care, and discussed in rounds. CM met with patient's wife at bedside to explain role and offer support. Patient's wife confirmed demographics. Baseline:   ADLs/IDALS:Independent to include driving PTA  Previous Home Health:None  Previous SNF:None  ER Contact: Naresh Whitmore Areas: [phone]    Patient lives in a single level house with 2 steps to enter. Patient is independent with ADLs/IDALs PTA  Patient has no previous HH or equipment needs. Patient's preferred pharmacy is [facility] located on [address]. Transportation at discharge TBD  Reason for Admission:  Presented to [address] [facility] ED from home after being found unresponsive by family. Admit with Large left frontal parenchymal hemorrhage  With subarachnoid extension and left to right midline shift. RUR Score:  11%                   Plan for utilizing home health:  TBD        PCP: First and Last name:  Breonna Nogueira MD     [facility] of Practice: [facility]   Are you a current patient: Yes/No: Yes   Approximate date of last visit: unknown   Can you participate in a virtual visit with your PCP: No                    Current Advanced Directive/Advance Care Plan: Full Code      Healthcare Decision Maker:   Click here to complete [address] including selection of the Healthcare Decision Maker Relationship (ie \"Primary\")           Care manger met with patient's wife Pavel Dodd to discuss transitions of care. Patient was independent to include driving prior to admission. Care management will follow for transitions of care.    Faith Gerardo RN,Care Management  Care Management Interventions  PCP Verified by CM: Yes(Carroll Morfin )  MyChart Signup: No  Discharge Durable Medical Equipment: No  Physical Therapy Consult: Yes  Occupational Therapy Consult: Yes  Speech Therapy Consult: Yes  Current Support Network: Lives with Spouse(naresh Whitmore Areas: [phone])

## 2021-04-07 NOTE — CONSULTS
Neurology Consult  Milburn Favre Lack, NP    Patient: Helder Valencia MRN: [id number]  SSN: [id number]    YOB: 1940  Age: 80 y.o. Sex: male      Chief Complaint: unresponsive    Subjective:      Helder Valencia is a 80 y.o. male with a pmh of HTN who presented to [address] [place] [address] ED on 4/6/21 via EMS after his family found him in his chair unresponsive. EMS administered Narcan with no effect. He was last seen normal the day before. In the ED, a stat CT of his head was obtained which showed a parenchymal hemorrhage in the left frontal lobe measuring 48 x 60 x 54 mm with IVH with 11 mm left to right midline shift, and dilation of the left lateral ventricle. Neurosurgery was consulted, no surgical intervention indicated at this time. BP was 165/103 on arrival to [address] [place] [address], but did get as high as 181/101 while in the ED, so he was started on a cardene gtt. He was then transferred to [facility] for higher level of care. Per report, pt does not take any ASA or blood thinners at home. No past medical history on file. No family history on file. Social History     Tobacco Use    Smoking status: Not on file   Substance Use Topics    Alcohol use: Not on file      Prior to Admission Medications   Prescriptions Last Dose Informant Patient Reported? Taking?   ondansetron (ZOFRAN ODT) 4 mg disintegrating tablet   No No   Sig: Take 1 Tab by mouth every eight (8) hours as needed for Nausea. Facility-Administered Medications: None       Allergies   Allergen Reactions    Pcn [Penicillins] Rash     Review of Systems:  Review of systems not obtained due to patient factors. aphaisa    Objective:     Vitals:    04/07/21 0131 04/07/21 0145 04/07/21 0200 04/07/21 0216   BP: (!) 141/84 133/76 133/71 117/72   Pulse: 100 95 92 92   Resp: 17 22 17    Temp: 99.1 °F (37.3 °C) 99.1 °F (37.3 °C)     SpO2: 94% 94% 93%    Weight:  169 lb 15.6 oz (77.1 kg)        Physical Exam:  GENERAL: Calm, NAD  SKIN: Warm, dry, color appropriate for ethnicity. NEURO: Somnolent, eyes open to voice. Does not follow commands. No speech. PERRL, 3 mm bilaterally. Blinks to threat on left but not on right. No disconjugate gaze present. Cannot assess EOM. Moves left arm and leg spontaneously. Right arm and leg withdraw to painful stimuli only. Bulk and tone normal. No involuntary movements. Gait deferred. Labs:  Lab Results   Component Value Date/Time    WBC 15.7 (H) 04/06/2021 08:24 PM    HGB 14.6 04/06/2021 08:24 PM    HCT 44.4 04/06/2021 08:24 PM    PLATELET 658 41/03/0501 08:24 PM    MCV 87.9 04/06/2021 08:24 PM      Lab Results   Component Value Date/Time    Sodium 133 (L) 04/06/2021 08:24 PM    Potassium 3.6 04/06/2021 08:24 PM    Chloride 99 04/06/2021 08:24 PM    CO2 24 04/06/2021 08:24 PM    Anion gap 10 04/06/2021 08:24 PM    Glucose 190 (H) 04/06/2021 08:24 PM    BUN 22 (H) 04/06/2021 08:24 PM    Creatinine 1.21 04/06/2021 08:24 PM    BUN/Creatinine ratio 18 04/06/2021 08:24 PM    GFR est AA >60 04/06/2021 08:24 PM    GFR est non-AA 58 (L) 04/06/2021 08:24 PM    Calcium 8.9 04/06/2021 08:24 PM     Lab Results   Component Value Date/Time    CK 56 08/28/2018 09:06 PM    CK - MB <1.0 08/28/2018 09:06 PM    CK-MB Index Cannot be calculated 08/28/2018 09:06 PM    Troponin-I, Qt. <0.05 04/06/2021 08:24 PM     Imaging:  CT Results (maximum last 3): Results from Hospital Encounter encounter on 04/06/21   CT HEAD WO CONT    Narrative HEAD CT WITHOUT CONTRAST: 4/6/2021 9:55 PM    INDICATION: Altered mental status    COMPARISON: None. PROCEDURE: Axial images of the head were obtained without contrast. Coronal and  sagittal reformats were performed. CT dose reduction was achieved through use of  a standardized protocol tailored for this examination and automatic exposure  control for dose modulation. FINDINGS: Parenchymal hemorrhage in the left frontal lobe measures 48 x 60 x 54  mm.  This breaks out into the lateral ventricle, and there is dependently  layering subarachnoid hemorrhage in the left greater than right lateral  ventricles. Parenchymal edema surrounds the hemorrhage. Mass effect causes  effacement of the left frontal sulci, 11 mm left to right midline shift, and  dilation of the left lateral ventricle. There is trace subarachnoid hemorrhage in the posterior right sylvian fissure,  at the junction of the frontal, parietal, and temporal lobes (601-63, 602-18). A right posteroinferior cerebellar infarction is age indeterminate. Impression 1. Large left frontal parenchymal hemorrhage. 2. Subarachnoid extension into the lateral ventricle. 3. Left to right midline shift, left lateral ventricular dilation, and local  sulcal effacement in the frontal lobe. 4. Trace subarachnoid hemorrhage in the posterior, right sylvian fissure. 5. 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 The findings were called to Dr. Lianet Oropeza on 4/6/2021 10:03 PM by Dr. Urmila Meek. [id number]       Assessment:     Hospital Problems  Never Reviewed          Codes Class Noted POA    Basal ganglia hemorrhage ([facility] [address] ICD-10-CM: I61.0  ICD-9-CM: 393  4/7/2021 Unknown            Plan:     Intracerebral Hemorrhage, ICH score: 4   - CT head showed parenchymal hemorrhage in the left frontal lobe measures 48 x 60 x 54 mm with IVH   - CTA ordered to assess for vascular source of hemorrhage   - SBP goal less than 140, Cardene/Labetalol PRN   - Keppra 500 BID for seizure ppx   - q1 neuro checks    - A1C and lipid panel pending, LDL goal <70              - MRI brain ordered              - ECHO ordered              - PT/OT/SLP evals              - Stroke education when appropriate   - NSGY consulted, no surgical intervention at this time     I have discussed the diagnosis and the intended plan as seen in the above orders with Dr. Eubanks July, further recommendations to follow.      Thank you for this consult and participating in the care of this patient.   Signed By: Gary Klein NP     April 7, 2021

## 2021-04-07 NOTE — PROGRESS NOTES
0730: Bedside and Verbal shift change report given to ANDREZ Godfrey (oncoming nurse) by Rachid Balderrama RN (offgoing nurse). Report included the following information SBAR, Kardex, Intake/Output, MAR, Recent Results, Cardiac Rhythm SR-ST, Alarm Parameters  and Dual Neuro Assessment.

## 2021-04-07 NOTE — ED NOTES
Report given to Herve Mercer RN at [facility] and patient going to room 7118.

## 2021-04-07 NOTE — ROUTINE PROCESS
Occupational therapy 1109 -   04.07.2021    Orders acknowledged and chart reviewed in prep for OT evaluation. Discussed patient case with RN and PT who reports patient not following commands. Will hold and f/u as able and medically appropriate. Thank you. Pelon Rand MS, OTR/L

## 2021-04-07 NOTE — H&P
[facility]    ICU TEAM H & P Note    Name: Lyle Noriega   : 1940   MRN: [id number]   Date: 2021        Subjective:     Reason for ICU Admission: This is an 80 yr old male with a PMHX of DM and HTN who was LKW  in the am. When he was seen on  by family he was somnolent in his chair. He was taken to [facility] ER where he was found to have a large left parenchymal hemorrhage in the frontal lobe measuring 4.8cm x 6cm x 5.4cm with IVC into the lateral ventricle and a midline shift approx. 1.1 cm to the right with trace subarrachnoid hemorrhage in the posterior right sylvian fissure. On exam he was protecting his airway but with GCS 8-9 per report and with BP 689U systolic and was started on Cardene infusion. Neurosurgery Dr. Sebas Borja was consulted with recommendation for transfer to [facility] ICU. He arrived to the ICU awake, moving to noxious stimuli and aphasic with hemodynamics stable with Cardene gtt off. All historical information is obtained via chart review. Past Medical History:     DM, HTN    Past Surgical History:     Unknown    Home Medications:     Glipizide, hydrochlorothiazide, lisinopril, metformin    Allergies/Social/Family History: Allergies   Allergen Reactions    Pcn [Penicillins] Rash      Social History     Tobacco Use    Smoking status: Not on file   Substance Use Topics    Alcohol use: Not on file      No family history on file. Review of Systems:     Review of systems not obtained due to patient factors.     Objective:   Vital Signs:  Visit Vitals  /76 (BP 1 Location: Left upper arm, BP Patient Position: Supine)   Pulse 95   Temp 99.1 °F (37.3 °C)   Resp 22   Wt 77.1 kg (169 lb 15.6 oz)   SpO2 94%   BMI 25.10 kg/m²      O2 Device: Room air Temp (24hrs), Av.6 °F (37.6 °C), Min:99.1 °F (37.3 °C), Max:101.1 °F (38.4 °C)         Intake/Output:     Intake/Output Summary (Last 24 hours) at 2021 0201  Last data filed at 2021 0145  Gross per 24 hour Intake --   Output 0 ml   Net 0 ml     Physical Exam:    General:  Alert, calm, cooperative, well noursished, well developed, appears stated age   Eyes:  Sclera anicteric. Pupils equally round and reactive to light. midline   Mouth/Throat: Mucous membranes dry, oral pharynx clear   Neck: Supple   Lungs:   Clear to auscultation bilaterally, good effort, room air   CV:  Regular rate and rhythm,no murmur, click, rub or gallop   Abdomen:   Soft, non-tender. bowel sounds normal. non-distended, more pronounced on right lower abd   Extremities: No cyanosis or edema   Skin: Skin color, texture, turgor normal. no acute rash or lesions   Lymph nodes: Cervical and supraclavicular normal   Musculoskeletal: No swelling or deformity. 5/5 strength  LUE, 1/5 RUE, WD with BLE to noxious stimuli   Lines/Devices:  Intact, no erythema, drainage or tenderness   Psych: Alert, aphasic, attends to voice, does not follow commands      LABS AND  DATA: Personally reviewed  Recent Labs     04/06/21 2024   WBC 15.7*   HGB 14.6   HCT 44.4        Recent Labs     04/06/21 2024   *   K 3.6   CL 99   CO2 24   BUN 22*   CREA 1.21   *   CA 8.9   MG 2.4     Recent Labs     04/06/21 2024   AP 84   TP 8.3*   ALB 4.1   GLOB 4.2*     No results for input(s): INR, PTP, APTT, INREXT in the last 72 hours. Recent Labs     04/06/21 2102   PHI 7.39   PCO2I 40.4   PO2I 26*     Recent Labs     04/06/21 2024   TROIQ <0.05       MEDS: Reviewed    Chest X-Ray:  CXR Results  (Last 48 hours)               04/06/21 2052  XR CHEST PORT Final result    Impression:  Shallow volumes and mild bibasilar atelectasis. Narrative:  PORTABLE CHEST RADIOGRAPH/S: 4/6/2021 8:52 PM       INDICATION: Altered mental status. COMPARISON: None. TECHNIQUE: Portable frontal upright radiograph/s of the chest.       FINDINGS:    The lungs are slightly hypoinflated, and there is mild bibasilar atelectasis. The central airways are patent.  No pneumothorax and no large pleural effusion. CT Results  (Last 48 hours)               04/06/21 2155  CT HEAD WO CONT Final result    Impression:  1. Large left frontal parenchymal hemorrhage. 2. Subarachnoid extension into the lateral ventricle. 3. Left to right midline shift, left lateral ventricular dilation, and local   sulcal effacement in the frontal lobe. 4. Trace subarachnoid hemorrhage in the posterior, right sylvian fissure. 5. 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 The findings were called to Dr. Alexandrea Laird on 4/6/2021 10:03 PM by Dr. Yady Gonzales. [id number]           Narrative:  HEAD CT WITHOUT CONTRAST: 4/6/2021 9:55 PM       INDICATION: Altered mental status       COMPARISON: None. PROCEDURE: Axial images of the head were obtained without contrast. Coronal and   sagittal reformats were performed. CT dose reduction was achieved through use of   a standardized protocol tailored for this examination and automatic exposure   control for dose modulation. FINDINGS: Parenchymal hemorrhage in the left frontal lobe measures 48 x 60 x 54   mm. This breaks out into the lateral ventricle, and there is dependently   layering subarachnoid hemorrhage in the left greater than right lateral   ventricles. Parenchymal edema surrounds the hemorrhage. Mass effect causes   effacement of the left frontal sulci, 11 mm left to right midline shift, and   dilation of the left lateral ventricle. There is trace subarachnoid hemorrhage in the posterior right sylvian fissure,   at the junction of the frontal, parietal, and temporal lobes (601-63, 602-18). A right posteroinferior cerebellar infarction is age indeterminate. ECHO: pending    Multidisciplinary Rounds Completed:  Pending    ABCDEF Bundle/Checklist Completed:   Yes    Active Problem List:     Problem List  Never Reviewed          Codes Class    Basal ganglia hemorrhage ([facility] [address] ICD-10-CM: I61.0  ICD-9-CM: 623             ICU Assessment/ Comprehensive Plan of Care:     1) Large Left Basal Ganglia Bleed with HANNAH and midline shift  -Consult to Neurosurgery, Dr. Juve Pruett, non surgical candidate  -Consult to neurology, Odalis Crawford NP notified  -Keppra 500mg IV Q12hr  -Keep SBP < 140, nicardipine as needed  -Check lipid profile, HgbA1c  -Repeat Head CT this am  -PT/OT/ST eval  -Consider Palliative care consult if no ACP documents with family  -Q1hr neuro checks    2) Hypertension  -Maintain SBP < 140  -Nicardipine gtt as needed  -PRN labetalol, hydralazine  -Hold PO home meds for now    3) Diabetes Mellitus  -SSI coverage Q6hr  -Check HgbA1c    F - Feeding:  NPO, place DHT   A - Analgesia: acetaminophen, Morphine PRN  S - Sedation: GOAL RASS 0  T - DVT Prophylaxis: SCD's or Sequential Compression Device , No chemical Px  H - Head of Bed: > 30 Degrees  U - Ulcer Prophylaxis: famotidine  G - Glycemic Control: Y  S - Spontaneous Breathing Trial: NA  B - Bowel Regimen: Pericolace  I - Indwelling Catheter:              T/L/D  Tubes: None  Lines: Peripheral IV  Drains: None  D - De-escalation of Antibiotics:  NA      DISPOSITION/COMMUNICATION  Discussed Plan of Care/Code Status: Full Code-Needs clarification with family  Stay in ICU    CRITICAL CARE CONSULTANT NOTE  I had a face to face encounter with the patient, reviewed and interpreted patient data including clinical events, labs, images, vital signs, I/O's, and examined patient. I have discussed the case and the plan and management of the patient's care with the consulting services, the bedside nurses and the respiratory therapist.      NOTE OF PERSONAL INVOLVEMENT IN CARE   This patient has a high probability of imminent, clinically significant deterioration, which requires the highest level of preparedness to intervene urgently.  I participated in the decision-making and personally managed or directed the management of the following life and organ supporting interventions that required my frequent assessment to treat or prevent imminent deterioration. I personally spent 70 minutes of critical care time. This is time spent at this critically ill patient's bedside actively involved in patient care as well as the coordination of care and discussions with the patient's family. This does not include any procedural time which has been billed separately.     LUIS Mattson-BC  Intensivist Nurse Practitioner  [facility]  4/7/2021

## 2021-04-07 NOTE — PROGRESS NOTES
Speech pathology note  Patient currently off the floor. Will follow for SLP evaluation as patient available. Thank you. Addendum [phone]: Patient returned to floor however drowsy with poor command following per chart review and discussion with RN. SLP will follow for swallowing evaluation as medically appropriate. Thank you.     Christian Magana., CCC-SLP

## 2021-04-07 NOTE — PROGRESS NOTES
0005: Telephone report received from Salvatore Zepeda at [address] [facility] ED.     0145: TRANSFER - IN REPORT:    Verbal report received from William Vitale RN (name) on Natalya Pruitt  being received from [id number] [facility] ED (unit) for urgent transfer      Report consisted of patients Situation, Background, Assessment and   Recommendations(SBAR). Information from the following report(s) SBAR, Kardex, ED Summary, Intake/Output, MAR, Recent Results and Cardiac Rhythm Sinus tach was reviewed with the receiving nurse. Opportunity for questions and clarification was provided. Assessment completed upon patients arrival to unit and care assumed. 0145: Admission assessment. Eyes open to voice, pupils 3mm brisk, conjugate, some focuses and tracking but not on command. No command following. MARES, R side weak/floppy. No facial droop. Strong, productive cough. Lungs sound coars. Abdomen is mildly tender and distended on the R side. VSS, SBP goal <140 with nicardipine gtt infusing at 5mg/hr. 0216: Cardene gtt stopped. PRN antihypertensives added by Ryan Wadsworth NP.     0430: Restless and continuously putting hand on forehead. HR, BP and RR elevated. Treated pain with prn morphine. 0500: Pt resting comfortably after treating pain. 0630: MRI screening form and PTA med list completed with spouse, Pavel Dodd, who reports that the patient has had an injury to his eye involving metal many years ago. 6534: MRI tech states that per radiology the orbits are clear according to head CT done earlier this morning - no orbital/facial x-ray needed to r/o metal in eyes. 0730: Bedside shift change report given to Epifanio MAGUIRE (oncoming nurse) by Wade Lima (offgoing nurse). Report included the following information SBAR, Kardex, Intake/Output, MAR, Recent Results, Cardiac Rhythm NSR and Alarm Parameters .

## 2021-04-07 NOTE — PROGRESS NOTES
Physical Therapy Note  04/07/2021    Order acknowledged and chart reviewed. Pt admitted with large L BG hemorrhage with HANNAH and shift. Spoke with RN who reports pt remains extremely drowsy, not verbalizing, and not following any commands. Will defer today and continue to follow for formal PT eval as alertness improves. Chart reviewed, patient received sedated and on vent. Per ABCDEF protocol, will work with patient when PEEP is 10.0 or less, FIO2 60% or less, and patient is following basic commands. Will follow patient peripherally. Recommend nursing to complete with patient, as able and per protocol, in order to promote cardiopulmonary systems, maintain strength, endurance and independence:   -bed in chair position or maximize full reverse Trendelenburg position to facilitate upright activity with foot board and non-skid footwear on 3x/day ~30-60 mins each   -ROM during bathing B UEs and LEs  -positioning to prevent contractures and edema. Thank you for your assistance.      Renu Baez, PT, DPT

## 2021-04-07 NOTE — PROGRESS NOTES
Neurosurgery Progress Note  Ermelinda Torres [facility]          Admit Date: 2021   LOS: 0 days        Daily Progress Note: 2021        Subjective: The patient was found unresponsive by family somnolent in his recliner. He was seen normal the day before. The patient's head CT in the ER at [address] [facility] revealed a large 4.8 x 6 x 5.4 cm hemorrhage originating in the left caudate head with intraventricular hemorrhage. He was not on antiplatelet or blood thinning medications per review of his prior to admission medication list. He transferred to [facility] for further evaluation. This morning he presents in the ICU. He is sleepy, but wakes up to voice. Unable to obtain ROS due to AMS. Objective:     Vital signs  Temp (24hrs), Av.2 °F (37.3 °C), Min:97.4 °F (36.3 °C), Max:101.1 °F (38.4 °C)   701 -  1900  In: 94.2 [I.V.:94.2]  Out: 250 [Urine:250]  1901 -  0700  In: 106.7 [I.V.:106.7]  Out: 320 [Urine:320]    Visit Vitals  /88   Pulse 81   Temp 97.4 °F (36.3 °C)   Resp 16   Ht 5' 7\" (1.702 m) Comment: From documentation on 21   Wt 77.1 kg (169 lb 15.6 oz)   SpO2 94%   BMI 26.62 kg/m²    O2 Flow Rate (L/min): 2 l/min O2 Device: Room air     Pain control  Pain Assessment  Pain Scale 1: Adult Nonverbal Pain Scale  Pain Intensity 1: 0  Pain Intervention(s) 1: Medication (see MAR)    PT/OT  Gait                 Physical Exam:  Gen:NAD. Neuro: Sleepy. Non-verbal so unable to obtain orientation. Not following commands. Withdraws to pain in all extremities L>R and RLE>RUE. Affect flat. Pupils R 3 mm brisk reaction to light, left pupil 2 mm brisk reaction to light. CT head without contrast on 21 shows large left frontal parenchymal hemorrhage. Subarachnoid extension into the lateral ventricle. Left to right midline shift, left lateral ventricular dilation, and local sulcal effacement in the frontal lobe. Trace subarachnoid hemorrhage in the posterior, right sylvian fissure. 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 24 hour results:    Recent Results (from the past 24 hour(s))   EKG, 12 LEAD, INITIAL    Collection Time: 04/06/21  8:13 PM   Result Value Ref Range    Ventricular Rate 104 BPM    Atrial Rate 104 BPM    P-R Interval 168 ms    QRS Duration 78 ms    Q-T Interval 348 ms    QTC Calculation (Bezet) 457 ms    Calculated P Axis 30 degrees    Calculated R Axis -19 degrees    Calculated T Axis 63 degrees    Diagnosis       Sinus tachycardia  Minimal voltage criteria for LVH, may be normal variant  When compared with ECG of 28-AUG-2018 21:01,  premature atrial complexes are no longer present  Vent. rate has increased BY  36 BPM  Confirmed by Tushar Bonilla, P.V. ([id number]) on 4/6/2021 79:88:99 PM     METABOLIC PANEL, COMPREHENSIVE    Collection Time: 04/06/21  8:24 PM   Result Value Ref Range    Sodium 133 (L) 136 - 145 mmol/L    Potassium 3.6 3.5 - 5.1 mmol/L    Chloride 99 97 - 108 mmol/L    CO2 24 21 - 32 mmol/L    Anion gap 10 5 - 15 mmol/L    Glucose 190 (H) 65 - 100 mg/dL    BUN 22 (H) 6 - 20 MG/DL    Creatinine 1.21 0.70 - 1.30 MG/DL    BUN/Creatinine ratio 18 12 - 20      GFR est AA >60 >60 ml/min/1.73m2    GFR est non-AA 58 (L) >60 ml/min/1.73m2    Calcium 8.9 8.5 - 10.1 MG/DL    Bilirubin, total 0.5 0.2 - 1.0 MG/DL    ALT (SGPT) 13 12 - 78 U/L    AST (SGOT) 10 (L) 15 - 37 U/L    Alk. phosphatase 84 45 - 117 U/L    Protein, total 8.3 (H) 6.4 - 8.2 g/dL    Albumin 4.1 3.5 - 5.0 g/dL    Globulin 4.2 (H) 2.0 - 4.0 g/dL    A-G Ratio 1.0 (L) 1.1 - 2.2     TROPONIN I    Collection Time: 04/06/21  8:24 PM   Result Value Ref Range    Troponin-I, Qt. <0.05 <0.05 ng/mL   NT-PRO BNP    Collection Time: 04/06/21  8:24 PM   Result Value Ref Range    NT pro- <450 PG/ML   CBC WITH AUTOMATED DIFF    Collection Time: 04/06/21  8:24 PM   Result Value Ref Range    WBC 15.7 (H) 4.1 - 11.1 K/uL    RBC 5.05 4. 10 - 5.70 M/uL    HGB 14.6 12.1 - 17.0 g/dL    HCT 44.4 36.6 - 50.3 %    MCV 87.9 80.0 - 99.0 FL    MCH 28.9 26.0 - 34.0 PG    MCHC 32.9 30.0 - 36.5 g/dL    RDW 14.1 11.5 - 14.5 %    PLATELET 658 144 - 591 K/uL    MPV 10.6 8.9 - 12.9 FL    NRBC 0.0 0  WBC    ABSOLUTE NRBC 0.00 0.00 - 0.01 K/uL    NEUTROPHILS 92 (H) 32 - 75 %    LYMPHOCYTES 4 (L) 12 - 49 %    MONOCYTES 3 (L) 5 - 13 %    EOSINOPHILS 0 0 - 7 %    BASOPHILS 0 0 - 1 %    IMMATURE GRANULOCYTES 1 (H) 0.0 - 0.5 %    ABS. NEUTROPHILS 14.4 (H) 1.8 - 8.0 K/UL    ABS. LYMPHOCYTES 0.6 (L) 0.8 - 3.5 K/UL    ABS. MONOCYTES 0.5 0.0 - 1.0 K/UL    ABS. EOSINOPHILS 0.0 0.0 - 0.4 K/UL    ABS. BASOPHILS 0.0 0.0 - 0.1 K/UL    ABS. IMM.  GRANS. 0.2 (H) 0.00 - 0.04 K/UL    DF SMEAR SCANNED      RBC COMMENTS NORMOCYTIC, NORMOCHROMIC     LACTIC ACID    Collection Time: 04/06/21  8:24 PM   Result Value Ref Range    Lactic acid 2.0 0.4 - 2.0 MMOL/L   MAGNESIUM    Collection Time: 04/06/21  8:24 PM   Result Value Ref Range    Magnesium 2.4 1.6 - 2.4 mg/dL   URINALYSIS W/ RFLX MICROSCOPIC    Collection Time: 04/06/21  8:29 PM   Result Value Ref Range    Color YELLOW/STRAW      Appearance CLEAR CLEAR      Specific gravity 1.021 1.003 - 1.030      pH (UA) 5.5 5.0 - 8.0      Protein TRACE (A) NEG mg/dL    Glucose Negative NEG mg/dL    Ketone 80 (A) NEG mg/dL    Blood Negative NEG      Urobilinogen 0.2 0.2 - 1.0 EU/dL    Nitrites Negative NEG      Leukocyte Esterase Negative NEG      WBC 0-4 0 - 4 /hpf    RBC 0-5 0 - 5 /hpf    Epithelial cells FEW FEW /lpf    Bacteria Negative NEG /hpf    Hyaline cast 0-2 0 - 5 /lpf   BILIRUBIN, CONFIRM    Collection Time: 04/06/21  8:29 PM   Result Value Ref Range    Bilirubin UA, confirm Negative NEG     POC EG7    Collection Time: 04/06/21  9:02 PM   Result Value Ref Range    Calcium, ionized (POC) 1.11 (L) 1.12 - 1.32 mmol/L    pH (POC) 7.39 7.35 - 7.45      pCO2 (POC) 40.4 35.0 - 45.0 MMHG    pO2 (POC) 26 (LL) 80 - 100 MMHG    HCO3 (POC) 24.4 22 - 26 MMOL/L    Base deficit (POC) 1 mmol/L    sO2 (POC) 47 (L) 92 - 97 %    Site RIGHT RADIAL      Device: ROOM AIR      Allens test (POC) NO      Specimen type (POC) VENOUS BLOOD     CULTURE, BLOOD    Collection Time: 04/06/21  9:25 PM    Specimen: Blood   Result Value Ref Range    Special Requests: NO SPECIAL REQUESTS      Culture result: NO GROWTH AFTER 9 HOURS     CULTURE, BLOOD    Collection Time: 04/06/21 10:15 PM    Specimen: Blood   Result Value Ref Range    Special Requests: NO SPECIAL REQUESTS      Culture result: NO GROWTH AFTER 9 HOURS     METABOLIC PANEL, BASIC    Collection Time: 04/07/21  2:25 AM   Result Value Ref Range    Sodium 135 (L) 136 - 145 mmol/L    Potassium 4.2 3.5 - 5.1 mmol/L    Chloride 102 97 - 108 mmol/L    CO2 22 21 - 32 mmol/L    Anion gap 11 5 - 15 mmol/L    Glucose 196 (H) 65 - 100 mg/dL    BUN 21 (H) 6 - 20 MG/DL    Creatinine 1.12 0.70 - 1.30 MG/DL    BUN/Creatinine ratio 19 12 - 20      GFR est AA >60 >60 ml/min/1.73m2    GFR est non-AA >60 >60 ml/min/1.73m2    Calcium 8.6 8.5 - 10.1 MG/DL   CBC W/O DIFF    Collection Time: 04/07/21  2:25 AM   Result Value Ref Range    WBC 15.6 (H) 4.1 - 11.1 K/uL    RBC 5.10 4. 10 - 5.70 M/uL    HGB 14.5 12.1 - 17.0 g/dL    HCT 44.4 36.6 - 50.3 %    MCV 87.1 80.0 - 99.0 FL    MCH 28.4 26.0 - 34.0 PG    MCHC 32.7 30.0 - 36.5 g/dL    RDW 14.1 11.5 - 14.5 %    PLATELET 825 576 - 244 K/uL    MPV 10.3 8.9 - 12.9 FL    NRBC 0.0 0  WBC    ABSOLUTE NRBC 0.00 0.00 - 0.01 K/uL   MAGNESIUM    Collection Time: 04/07/21  2:25 AM   Result Value Ref Range    Magnesium 2.3 1.6 - 2.4 mg/dL   PHOSPHORUS    Collection Time: 04/07/21  2:25 AM   Result Value Ref Range    Phosphorus 3.2 2.6 - 4.7 MG/DL   LIPID PANEL    Collection Time: 04/07/21  2:25 AM   Result Value Ref Range    LIPID PROFILE          Cholesterol, total 160 <200 MG/DL    Triglyceride 155 (H) <150 MG/DL    HDL Cholesterol 60 MG/DL    LDL, calculated 69 0 - 100 MG/DL    VLDL, calculated 31 MG/DL    CHOL/HDL Ratio 2.7 0.0 - 5.0     HEMOGLOBIN A1C WITH EAG    Collection Time: 04/07/21  2:25 AM   Result Value Ref Range    Hemoglobin A1c 6.4 (H) 4.0 - 5.6 %    Est. average glucose 137 mg/dL   GLUCOSE, POC    Collection Time: 04/07/21  5:08 AM   Result Value Ref Range    Glucose (POC) 199 (H) 65 - 100 mg/dL    Performed by Niesha Frank           Assessment:     Active Problems:    Basal ganglia hemorrhage ([address] (4/7/2021)        Plan:   1. Basal ganglia hemorrhage   - no surgical intervention   - cont neuro checks   - BP control   - PT/OT/Speech as able   - stroke education   - Neurology consulted   - MRI per neurology  2. Cerebral edema with brain compression   - due to #1   - steroids no indicated   - plans as above  3. Acute metabolic encephalopathy   - mutli-factorial   - supportive care  4. HTN   - SBP<140   - Cardene PRN   - Hydralazine/labetalol PRN   - Intensivist to follow  5. Diabetes mellitus, type 2   - hold PO meds   - SSI and accu-checks   - Intensivist following    Activity: OOB with assist  DVT ppx: SCDs  Dispo: tbd    Plan d/w Dr. Alexandro White, ICU nurse. Dr. Alexandro White will be by later to see the patient. Recommend conservative medical management at this time as patient's bleed is in the dominant basal ganglia.       Bishop Swift NP

## 2021-04-07 NOTE — ED PROVIDER NOTES
EMERGENCY DEPARTMENT HISTORY AND PHYSICAL EXAM      Date: 4/6/2021  Patient Name: Gracia Pelaez    History of Presenting Illness     Chief Complaint   Patient presents with    Unresponsive     Family called EMS d/t unresposiveness from pt. Last normal was yesterday morning. Pt unable to follow commands or answer questions. EMS gave narcan with no change and started an I.V.         HPI: Gracia Pelaez, 80 y.o. male presents to the ED with cc of altered mental status. History obtained from family and EMS. Apparently he was his normal self yesterday morning, then he was found today in his recliner, somnolent. Apparently at baseline he is alert and oriented x4. He had been acting his usual self prior to this. Further history unable to be obtained from the patient. EMS gave Narcan without any change in his mentation. There are no other complaints, changes, or physical findings at this time. PCP: Kathy Leblanc MD    No current facility-administered medications on file prior to encounter. Current Outpatient Medications on File Prior to Encounter   Medication Sig Dispense Refill    ondansetron (ZOFRAN ODT) 4 mg disintegrating tablet Take 1 Tab by mouth every eight (8) hours as needed for Nausea. 16 Tab 0       Past History     Past Medical History:  Hypertension, diabetes      Medications: Glipizide, hydrochlorothiazide, lisinopril, Metformin    Past Surgical History:  None significant    Family History:  Nonsignificant    Social History:  No smoking drinking or drugs    Allergies: Allergies   Allergen Reactions    Pcn [Penicillins] Rash         Review of Systems   Unable to obtain secondary to patient's mental status    Physical Exam   Physical Exam  Constitutional:       Comments: Somnolent, opens eyes spontaneously, moaning   HENT:      Head: Normocephalic and atraumatic. Mouth/Throat:      Mouth: Mucous membranes are dry. Eyes:      Pupils: Pupils are equal, round, and reactive to light. Comments: Gaze preference to the left   Neck:      Musculoskeletal: Neck supple. Cardiovascular:      Rate and Rhythm: Regular rhythm. Tachycardia present. Pulmonary:      Effort: Pulmonary effort is normal.      Breath sounds: Normal breath sounds. Abdominal:      Palpations: Abdomen is soft. Comments: No grimace to palpation of the abdomen   Musculoskeletal:         General: No swelling or deformity. Skin:     General: Skin is warm and dry. Neurological:      Comments: Pupils equal round and reactive to light, moans to tactile stimulus, eyes open spontaneously and to voice, moves his left upper extremity spontaneously. He moves his left upper extremity, however does not seem to move his right lower extremity. He has gaze preference to the left. Does not make comprehensible verbalizations, does not follow commands. Psychiatric:      Comments: Somnolent         Diagnostic Study Results     Labs -     Recent Results (from the past 24 hour(s))   EKG, 12 LEAD, INITIAL    Collection Time: 04/06/21  8:13 PM   Result Value Ref Range    Ventricular Rate 104 BPM    Atrial Rate 104 BPM    P-R Interval 168 ms    QRS Duration 78 ms    Q-T Interval 348 ms    QTC Calculation (Bezet) 457 ms    Calculated P Axis 30 degrees    Calculated R Axis -19 degrees    Calculated T Axis 63 degrees    Diagnosis       Sinus tachycardia  Minimal voltage criteria for LVH, may be normal variant  When compared with ECG of 28-AUG-2018 21:01,  premature atrial complexes are no longer present  Vent.  rate has increased BY  36 BPM  Confirmed by Walter Preciado, P.V. ([id number]) on 4/6/2021 02:41:52 PM     METABOLIC PANEL, COMPREHENSIVE    Collection Time: 04/06/21  8:24 PM   Result Value Ref Range    Sodium 133 (L) 136 - 145 mmol/L    Potassium 3.6 3.5 - 5.1 mmol/L    Chloride 99 97 - 108 mmol/L    CO2 24 21 - 32 mmol/L    Anion gap 10 5 - 15 mmol/L    Glucose 190 (H) 65 - 100 mg/dL    BUN 22 (H) 6 - 20 MG/DL    Creatinine 1.21 0.70 - 1.30 MG/DL    BUN/Creatinine ratio 18 12 - 20      GFR est AA >60 >60 ml/min/1.73m2    GFR est non-AA 58 (L) >60 ml/min/1.73m2    Calcium 8.9 8.5 - 10.1 MG/DL    Bilirubin, total 0.5 0.2 - 1.0 MG/DL    ALT (SGPT) 13 12 - 78 U/L    AST (SGOT) 10 (L) 15 - 37 U/L    Alk. phosphatase 84 45 - 117 U/L    Protein, total 8.3 (H) 6.4 - 8.2 g/dL    Albumin 4.1 3.5 - 5.0 g/dL    Globulin 4.2 (H) 2.0 - 4.0 g/dL    A-G Ratio 1.0 (L) 1.1 - 2.2     TROPONIN I    Collection Time: 04/06/21  8:24 PM   Result Value Ref Range    Troponin-I, Qt. <0.05 <0.05 ng/mL   NT-PRO BNP    Collection Time: 04/06/21  8:24 PM   Result Value Ref Range    NT pro- <450 PG/ML   CBC WITH AUTOMATED DIFF    Collection Time: 04/06/21  8:24 PM   Result Value Ref Range    WBC 15.7 (H) 4.1 - 11.1 K/uL    RBC 5.05 4. 10 - 5.70 M/uL    HGB 14.6 12.1 - 17.0 g/dL    HCT 44.4 36.6 - 50.3 %    MCV 87.9 80.0 - 99.0 FL    MCH 28.9 26.0 - 34.0 PG    MCHC 32.9 30.0 - 36.5 g/dL    RDW 14.1 11.5 - 14.5 %    PLATELET 205 013 - 716 K/uL    MPV 10.6 8.9 - 12.9 FL    NRBC 0.0 0  WBC    ABSOLUTE NRBC 0.00 0.00 - 0.01 K/uL    NEUTROPHILS 92 (H) 32 - 75 %    LYMPHOCYTES 4 (L) 12 - 49 %    MONOCYTES 3 (L) 5 - 13 %    EOSINOPHILS 0 0 - 7 %    BASOPHILS 0 0 - 1 %    IMMATURE GRANULOCYTES 1 (H) 0.0 - 0.5 %    ABS. NEUTROPHILS 14.4 (H) 1.8 - 8.0 K/UL    ABS. LYMPHOCYTES 0.6 (L) 0.8 - 3.5 K/UL    ABS. MONOCYTES 0.5 0.0 - 1.0 K/UL    ABS. EOSINOPHILS 0.0 0.0 - 0.4 K/UL    ABS. BASOPHILS 0.0 0.0 - 0.1 K/UL    ABS. IMM.  GRANS. 0.2 (H) 0.00 - 0.04 K/UL    DF SMEAR SCANNED      RBC COMMENTS NORMOCYTIC, NORMOCHROMIC     LACTIC ACID    Collection Time: 04/06/21  8:24 PM   Result Value Ref Range    Lactic acid 2.0 0.4 - 2.0 MMOL/L   MAGNESIUM    Collection Time: 04/06/21  8:24 PM   Result Value Ref Range    Magnesium 2.4 1.6 - 2.4 mg/dL   URINALYSIS W/ RFLX MICROSCOPIC    Collection Time: 04/06/21  8:29 PM   Result Value Ref Range    Color YELLOW/STRAW      Appearance CLEAR CLEAR Specific gravity 1.021 1.003 - 1.030      pH (UA) 5.5 5.0 - 8.0      Protein TRACE (A) NEG mg/dL    Glucose Negative NEG mg/dL    Ketone 80 (A) NEG mg/dL    Blood Negative NEG      Urobilinogen 0.2 0.2 - 1.0 EU/dL    Nitrites Negative NEG      Leukocyte Esterase Negative NEG      WBC 0-4 0 - 4 /hpf    RBC 0-5 0 - 5 /hpf    Epithelial cells FEW FEW /lpf    Bacteria Negative NEG /hpf    Hyaline cast 0-2 0 - 5 /lpf   BILIRUBIN, CONFIRM    Collection Time: 04/06/21  8:29 PM   Result Value Ref Range    Bilirubin UA, confirm Negative NEG     POC EG7    Collection Time: 04/06/21  9:02 PM   Result Value Ref Range    Calcium, ionized (POC) 1.11 (L) 1.12 - 1.32 mmol/L    pH (POC) 7.39 7.35 - 7.45      pCO2 (POC) 40.4 35.0 - 45.0 MMHG    pO2 (POC) 26 (LL) 80 - 100 MMHG    HCO3 (POC) 24.4 22 - 26 MMOL/L    Base deficit (POC) 1 mmol/L    sO2 (POC) 47 (L) 92 - 97 %    Site RIGHT RADIAL      Device: ROOM AIR      Allens test (POC) NO      Specimen type (POC) VENOUS BLOOD         Radiologic Studies -   CT HEAD WO CONT   Final Result   1. Large left frontal parenchymal hemorrhage. 2. Subarachnoid extension into the lateral ventricle. 3. Left to right midline shift, left lateral ventricular dilation, and local   sulcal effacement in the frontal lobe. 4. Trace subarachnoid hemorrhage in the posterior, right sylvian fissure. 5. 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 The findings were called to Dr. Edgar Mahmood on 4/6/2021 10:03 PM by Dr. Brian Rivers. [id number]         XR CHEST PORT   Final Result   Shallow volumes and mild bibasilar atelectasis. CT Results  (Last 48 hours)               04/06/21 2155  CT HEAD WO CONT Final result    Impression:  1. Large left frontal parenchymal hemorrhage. 2. Subarachnoid extension into the lateral ventricle. 3. Left to right midline shift, left lateral ventricular dilation, and local   sulcal effacement in the frontal lobe.    4. Trace subarachnoid hemorrhage in the posterior, right sylvian fissure. 5. 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 The findings were called to Dr. Leandro Galvez on 4/6/2021 10:03 PM by Dr. Arlyn Blanco. [id number]           Narrative:  HEAD CT WITHOUT CONTRAST: 4/6/2021 9:55 PM       INDICATION: Altered mental status       COMPARISON: None. PROCEDURE: Axial images of the head were obtained without contrast. Coronal and   sagittal reformats were performed. CT dose reduction was achieved through use of   a standardized protocol tailored for this examination and automatic exposure   control for dose modulation. FINDINGS: Parenchymal hemorrhage in the left frontal lobe measures 48 x 60 x 54   mm. This breaks out into the lateral ventricle, and there is dependently   layering subarachnoid hemorrhage in the left greater than right lateral   ventricles. Parenchymal edema surrounds the hemorrhage. Mass effect causes   effacement of the left frontal sulci, 11 mm left to right midline shift, and   dilation of the left lateral ventricle. There is trace subarachnoid hemorrhage in the posterior right sylvian fissure,   at the junction of the frontal, parietal, and temporal lobes ([id number], 602-18). A right posteroinferior cerebellar infarction is age indeterminate. CXR Results  (Last 48 hours)               04/06/21 2052  XR CHEST PORT Final result    Impression:  Shallow volumes and mild bibasilar atelectasis. Narrative:  PORTABLE CHEST RADIOGRAPH/S: 4/6/2021 8:52 PM       INDICATION: Altered mental status. COMPARISON: None. TECHNIQUE: Portable frontal upright radiograph/s of the chest.       FINDINGS:    The lungs are slightly hypoinflated, and there is mild bibasilar atelectasis. The central airways are patent. No pneumothorax and no large pleural effusion.                     Medical Decision Making   I am the first provider for this patient. I reviewed the vital signs, available nursing notes, past medical history, past surgical history, family history and social history. Vital Signs-Reviewed the patient's vital signs. Patient Vitals for the past 24 hrs:   Temp Pulse Resp BP SpO2   04/06/21 2330  (!) 109 18 (!) 149/78 95 %   04/06/21 2315  (!) 114 19 (!) 154/87 96 %   04/06/21 2300  (!) 106 16 (!) 162/84 95 %   04/06/21 2245  (!) 104 17 (!) 163/84 95 %   04/06/21 2230  (!) 110 19 (!) 186/95 95 %   04/06/21 2215  (!) 111 17 (!) 181/101 94 %   04/06/21 2115  (!) 106 17 (!) 152/94 93 %   04/06/21 2100  (!) 110 22 (!) 171/95 91 %   04/06/21 2049 (!) 101.1 °F (38.4 °C)       04/06/21 2045  (!) 103 17 (!) 179/91 93 %   04/06/21 2014 99.1 °F (37.3 °C) (!) 106 16 (!) 165/103 93 %         Provider Notes (Medical Decision Making):   80-year-old male presenting with altered mental status. Concern for stroke or intracranial hemorrhage, infection such as pneumonia or UTI, electrolyte/metabolic abnormalities. No signs of trauma. He was last seen normal greater than 24 hours ago. Currently protecting his airway. EKG is performed at 20: 13, shows sinus tachycardia at a rate of 104, , QRS 78, QTc 457, axis upright, no ST segment elevation or depression concerning for ACS, this is interpreted as sinus tachycardia. CBC shows leukocytosis of 15.7, he is febrile rectally, blood cultures will be obtained, antibiotics initiated. UA negative for UTI, basic metabolic panel with normal renal function, slight hyponatremia, no worrisome electrolyte abnormalities. Troponin is negative. Chest x-ray shows some bibasilar atelectasis. Venous blood gas shows normal pH, CO2 is not elevated and is normal at 48, bicarb normal at 24. CT head shows a large left frontal parenchymal hemorrhage with subarachnoid extension into the lateral ventricle there is midline shift.     I spoken about the findings above with the radiologist, reviewed the imaging myself. On reevaluation, the patient continues to be tachycardic, he is now hypertensive in the 170s to 180s. He will be initiated on nicardipine drip. He is not taking any blood thinners as above. His neurologic exam remained stable. [facility], who recommends no acute intervention admission to the ICU. The patient will be transferred to [facility] for this. I have spoken with intensivist.  Family members now at bedside, have spoken at length with both the daughter and wife. They report that as far as they know, at this point, the patient would want chest compressions and to be put on a ventilator if needed. On reevaluation, patient continues to be slightly tachycardic, he continues to open his eyes spontaneously and to voice, he moves his left upper extremity spontaneously, squeezes my hand, does not make any comprehensible verbalizations, he is protecting his airway and saturating well. He is able to clear his throat and clear secretions. Wife is Bradley Portillo, Home phone [phone], cell phone [phone]  Daughter is Emile Hoffman phone #[phone]    Critical Care Time:     CRITICAL CARE NOTE :    11:51 PM    IMPENDING DETERIORATION -Airway, Cardiovascular and CNS  ASSOCIATED RISK FACTORS - Hypoxia, Bleeding, Metabolic changes and CNS Decompensation  MANAGEMENT- Bedside Assessment and Supervision of Care  INTERPRETATION -  Xrays, CT Scan, Blood Gases, ECG and Blood Pressure  INTERVENTIONS - hemodynamic mngmt  CASE REVIEW - Hospitalist/Intensivist, Medical Sub-Specialist, Nursing and Family  TREATMENT RESPONSE -Stable  PERFORMED BY - Self    NOTES   :  I have spent 90 minutes of critical care time involved in lab review, consultations with specialist, family decision- making, bedside attention and documentation. This time excludes time spent in any separate billed procedures.   During this entire length of time I was immediately available to the patient . Angelica Hu MD      Disposition:  Transfer to [facility] ICU    PLAN:  1. Current Discharge Medication List        2.    Follow-up Information    None       Return to ED if worse     Diagnosis     Clinical Impression: Acute altered mental status secondary to intraparenchymal intracranial hemorrhage

## 2021-04-07 NOTE — PROGRESS NOTES
Roc Grewal, NP notified of patient's elevated BP and standing orders for treatment, new orders received.

## 2021-04-08 NOTE — PROGRESS NOTES
Physical Therapy: Abort    Chart reviewed, discussed with RN, attempted to see pt for PT evaluation. Pt currently sleeping soundly, did not arouse to verbal, tactile or noxious stimuli. Raised HOB in an attempt to increased alertness with increased vestibular input however pt remained asleep with eyes closed. Observed pt moving L side spontaneously but not on command, no movement observed on R side. Session aborted as pt is unable to actively participate. Will continue to follow.        Luis Enrique Mittal, PT, DPT

## 2021-04-08 NOTE — PROGRESS NOTES
Neurology Progress Note  Kerry Yen NP    Admit Date: 2021   LOS: 1 day      Daily Progress Note: 2021    HPI:Fadi Real is a 80 y.o. male with a pmh of HTN who presented to ED [facility] ED on 21 via EMS after his family found him in his chair unresponsive. EMS administered Narcan with no effect. He was last seen normal the day before. In the ED, a stat CT of his head was obtained which showed a parenchymal hemorrhage in the left frontal lobe measuring 48 x 60 x 54 mm with IVH with 11 mm left to right midline shift, and dilation of the left lateral ventricle. Neurosurgery was consulted, no surgical intervention indicated at this time. BP was 165/103 on arrival to ED [facility], but did get as high as 181/101 while in the ED, so he was started on a cardene gtt. He was then transferred to [facility] for higher level of care. Per report, pt does not take any ASA or blood thinners at home. Subjective:     No neuro events overnight. Allergies   Allergen Reactions    Pcn [Penicillins] Rash     Review of Systems:  Review of systems not obtained due to patient factors.     Objective:   Vital signs  Temp (24hrs), Av.4 °F (37.4 °C), Min:97.4 °F (36.3 °C), Max:100.6 °F (38.1 °C)   1901 -  0700  In: 85   Out: 170 [Urine:170]   07 -  1900  In: 357.1 [I.V.:307.1]  Out: 770 [Urine:770]  Visit Vitals  BP (!) 130/98 (BP 1 Location: Left upper arm, BP Patient Position: At rest)   Pulse 94   Temp 100 °F (37.8 °C)   Resp 28   Ht 5' 7\" (1.702 m) Comment: From documentation on 21   Wt 169 lb 15.6 oz (77.1 kg)   SpO2 94%   BMI 26.62 kg/m²    O2 Flow Rate (L/min): 2 l/min O2 Device: Nasal cannula   Vitals:    21 2230 21 2300 21 2315 21 0000   BP: 122/86 134/79 (!) 150/96 (!) 130/98   Pulse: 83 83 86 94   Resp: 25 20 20 28   Temp:  99.7 °F (37.6 °C)  100 °F (37.8 °C)   SpO2: 94% 94% 94% 94%   Weight:       Height:          Physical Exam:  GENERAL: Calm, NAD  SKIN: Warm, dry, color appropriate for ethnicity. NEURO: Somnolent, eyes open to sternal rub. Does not follow commands. No speech. PERRL, 2 mm bilaterally. Blinks to threat on left but not on right. No disconjugate gaze present. Cannot assess EOM. Moves left arm and leg spontaneously. Right arm and leg withdraw with some spontaneous movement, withdraws to painful stimuli in all 4 ext, left more brisk than right. Bulk and tone normal. Gait deferred. Labs:  Lab Results   Component Value Date/Time    WBC 15.6 (H) 04/07/2021 02:25 AM    HGB 14.5 04/07/2021 02:25 AM    HCT 44.4 04/07/2021 02:25 AM    PLATELET 580 62/83/6227 02:25 AM    MCV 87.1 04/07/2021 02:25 AM     Lab Results   Component Value Date/Time    Sodium 135 (L) 04/07/2021 02:25 AM    Potassium 4.2 04/07/2021 02:25 AM    Chloride 102 04/07/2021 02:25 AM    CO2 22 04/07/2021 02:25 AM    Anion gap 11 04/07/2021 02:25 AM    Glucose 196 (H) 04/07/2021 02:25 AM    BUN 21 (H) 04/07/2021 02:25 AM    Creatinine 1.12 04/07/2021 02:25 AM    BUN/Creatinine ratio 19 04/07/2021 02:25 AM    GFR est AA >60 04/07/2021 02:25 AM    GFR est non-AA >60 04/07/2021 02:25 AM    Calcium 8.6 04/07/2021 02:25 AM     Imaging:  CT Results (maximum last 3): Results from Hospital Encounter encounter on 04/07/21   CTA NECK    Narrative EXAM: CTA HEAD, CTA NECK    INDICATION: assess for vascular source of hemorrhage    COMPARISON: CT of the head dated April 7, 2021 and April 6, 2021. CONTRAST: 100 mL of Isovue-370. TECHNIQUE:  Unenhanced  images were obtained to localize the volume for  acquisition. Multislice helical axial CT angiography was performed from the  aortic arch to the top of the head during uneventful rapid bolus intravenous  contrast administration. Coronal and sagittal reformations and 3D post  processing was performed. CT dose reduction was achieved through use of a  standardized protocol tailored for this examination and automatic exposure  control for dose modulation. FINDINGS:    DELAYED ENHANCEMENT HEAD CT  4.8 x 5.8 cm parenchymal hemorrhage centered on the left and frontal lobe and  left basal ganglia is unchanged from yesterday's exam. Prominent surrounding  edema is redemonstrated. Blood layering in the posterior horns of the bilateral  ventricle is unchanged. 11 mm rightward shift of the septum pellucidum is  unchanged. Previous identified small area of right-sided subarachnoid hemorrhage  is not visualized. The left posterior horn is slightly enlarged, but unchanged from yesterday's  exam. The left frontal horn is effaced. Remote right cerebellar infarct redemonstrated. Dural venous sinuses are patent. No abnormal parenchymal or meningeal  enhancement. Mastoid air cells and paranasal sinuses are clear. CTA NECK  There is conventional three vessel arch anatomy. The bilateral subclavian,  common carotid, and internal carotid arteries are patent with no flow-limiting  stenosis. % of right carotid artery stenosis: 0  % of left carotid artery stenosis: 0  Measurements utilize NASCET criteria. NASCET method was utilized for calculating stenosis. Left vertebral artery is widely patent, the right is patent but somewhat  diminutive. The cervical soft tissues are unremarkable. There are degenerative  changes of the cervical spine. CTA HEAD  Moderate atherosclerotic disease of the bilateral intracranial internal carotid  arteries, without hemodynamically significant stenosis. Focal stenosis of the left A2 segment (602-52), nonocclusive. The anterior communicating artery is patent. The left vertebral artery is patent, with focal moderate atherosclerotic  disease, no hemodynamically significant stenosis. The right vertebral artery is  patent but diminutive. The right PICA is occluded, likely chronic given the  presence of remote infarct. The basilar artery is patent. The bilateral  posterior communicating arteries are diminutive.  The bilateral P1 segments are  widely patent. The posterior cerebral arteries are patent. There is a tiny but prominent perforating branch within the area of the left  parenchymal hemorrhage, without aneurysm, active extravasation, or vascular  malformation. Impression 1. Stable left frontal/basal ganglia parenchymal hemorrhage, with surrounding  edema. Associated mass effect, including 11 mm rightward midline shift and  enlargement of the left posterior horn, is unchanged. Bilateral intraventricular  hemorrhage is also unchanged. 2. No aneurysm or any other vascular abnormality. No active extravasation. 3. Chronically occluded right PICA, with associated chronic right cerebellar  infarct. 4. Focal stenosis of the left A2 segment. CTA HEAD    Narrative EXAM: CTA HEAD, CTA NECK    INDICATION: assess for vascular source of hemorrhage    COMPARISON: CT of the head dated April 7, 2021 and April 6, 2021. CONTRAST: 100 mL of Isovue-370. TECHNIQUE:  Unenhanced  images were obtained to localize the volume for  acquisition. Multislice helical axial CT angiography was performed from the  aortic arch to the top of the head during uneventful rapid bolus intravenous  contrast administration. Coronal and sagittal reformations and 3D post  processing was performed. CT dose reduction was achieved through use of a  standardized protocol tailored for this examination and automatic exposure  control for dose modulation. FINDINGS:    DELAYED ENHANCEMENT HEAD CT  4.8 x 5.8 cm parenchymal hemorrhage centered on the left and frontal lobe and  left basal ganglia is unchanged from yesterday's exam. Prominent surrounding  edema is redemonstrated. Blood layering in the posterior horns of the bilateral  ventricle is unchanged. 11 mm rightward shift of the septum pellucidum is  unchanged. Previous identified small area of right-sided subarachnoid hemorrhage  is not visualized.     The left posterior horn is slightly enlarged, but unchanged from yesterday's  exam. The left frontal horn is effaced. Remote right cerebellar infarct redemonstrated. Dural venous sinuses are patent. No abnormal parenchymal or meningeal  enhancement. Mastoid air cells and paranasal sinuses are clear. CTA NECK  There is conventional three vessel arch anatomy. The bilateral subclavian,  common carotid, and internal carotid arteries are patent with no flow-limiting  stenosis. % of right carotid artery stenosis: 0  % of left carotid artery stenosis: 0  Measurements utilize NASCET criteria. NASCET method was utilized for calculating stenosis. Left vertebral artery is widely patent, the right is patent but somewhat  diminutive. The cervical soft tissues are unremarkable. There are degenerative  changes of the cervical spine. CTA HEAD  Moderate atherosclerotic disease of the bilateral intracranial internal carotid  arteries, without hemodynamically significant stenosis. Focal stenosis of the left A2 segment (602-52), nonocclusive. The anterior communicating artery is patent. The left vertebral artery is patent, with focal moderate atherosclerotic  disease, no hemodynamically significant stenosis. The right vertebral artery is  patent but diminutive. The right PICA is occluded, likely chronic given the  presence of remote infarct. The basilar artery is patent. The bilateral  posterior communicating arteries are diminutive. The bilateral P1 segments are  widely patent. The posterior cerebral arteries are patent. There is a tiny but prominent perforating branch within the area of the left  parenchymal hemorrhage, without aneurysm, active extravasation, or vascular  malformation. Impression 1. Stable left frontal/basal ganglia parenchymal hemorrhage, with surrounding  edema. Associated mass effect, including 11 mm rightward midline shift and  enlargement of the left posterior horn, is unchanged.  Bilateral intraventricular  hemorrhage is also unchanged. 2. No aneurysm or any other vascular abnormality. No active extravasation. 3. Chronically occluded right PICA, with associated chronic right cerebellar  infarct. 4. Focal stenosis of the left A2 segment. CT HEAD WO CONT    Narrative EXAM: CT HEAD WO CONT    INDICATION: F/U Basal Ganglial hemorrhage    COMPARISON: 4/6/2021. CONTRAST: None. TECHNIQUE: Unenhanced CT of the head was performed using 5 mm images. Brain and  bone windows were generated. Coronal and sagittal reformats. CT dose reduction  was achieved through use of a standardized protocol tailored for this  examination and automatic exposure control for dose modulation. FINDINGS: Again noted is a parenchymal hemorrhage centered in the left frontal  lobe and left basal ganglia. The current study, this measures 5.5 x 4.7 cm and is not significantly changed. There is intraventricular extension into the left lateral ventricle with blood  layering in the occipital horn of the left lateral ventricle slightly decreased  from the prior study. Hemorrhage is noted in the support of the right lateral  ventricles well. There is left-to-right midline shift which is unchanged. Ventricular size is not significantly changed. Impression No significant change in intraparenchymal hemorrhage with left right midline  shift and intraventricular extension.          Assessment:   Active Problems:    Basal ganglia hemorrhage (HCC) (4/7/2021)      Plan:     Intracerebral Hemorrhage, ICH score: 4              - CT head showed parenchymal hemorrhage in the left frontal lobe measures 48 x 60 x 54 mm with IVH              - CTA negative for vascular source of hemorrhage              - SBP goal less than 140, Cardene/Labetalol PRN              - Keppra 500 BID for seizure ppx              - q1 neuro checks               - A1C ok at 6.4   - Lipid panel showing LDL of 69, at goal <70              - MRI brain showed few punctate foci of acute infarction in the parasagittal left frontoparietal lobe and left lateral frontal lobe              - ECHO ordered              - PT/OT/SLP evals              - Stroke education when appropriate              - NSGY consulted, no surgical intervention at this time     I have discussed the diagnosis and the intended plan as seen in the above orders with Dr. Tushar Camacho, further recommendations to follow.      Albert Keller NP  Neurocritical Care Nurse Practitioner

## 2021-04-08 NOTE — PROGRESS NOTES
1930: Bedside shift change report given to Julius Lema, student nurse and Tk Prince RN (oncoming nurse) by Hoda Santillan RN (offgoing nurse). Report included the following information SBAR, Kardex, Intake/Output, MAR, Recent Results, Cardiac Rhythm NSR and Alarm Parameters . 2000: spoke to IVONNE Hendrickson about restarting home antihypertensive medications. Orders received. 0730: Bedside shift change report given to ANDREZ Godfrey (oncoming nurse) by Julius Lema student nurse and Tk Prince RN (offgoing nurse). Report included the following information SBAR, Kardex, Intake/Output, MAR, Recent Results, Cardiac Rhythm NSR and Alarm Parameters .

## 2021-04-08 NOTE — PROGRESS NOTES
[facility]    ICU TEAM Progress Note    Name: Helder Valencia   : 1940   MRN: [id number]   Date: 2021      Subjective:   Progress Note: 2021      Mr Daphne Adamson is an 81 yo male w a PMH of prior cerebellar stroke, DM, and HTN transferred from [facility] to [facility] on  with a large basal ganglia hemorrhage with extension into the L lateral ventricle. He was last known well  in the am.  He was found on  by family, somnolent in his chair. He was then taken to the [facility] ED where CT head found large L parenchymal hemorrhage in the L frontal lobe measuring 4.8x6x5. 4 w midline shift 1.1 cm into the R with trace SAH into the posterior R sylvian fissure. Initial GCS 8-9 and BP 643R systolic. NSGY was consulted and he was transferred to [facility], as noted. He has been managed in the ICU at [facility]. He continues to require cardene for BP control for SBP <140. He has required NGT for dysphagia. He has been started on hydralazine 25mg TID and lisinopril 20mg BID. Nicardipine is being weaned and is currently at 5mg/h. Active Problem List:     Problem List  Never Reviewed          Codes Class    Basal ganglia hemorrhage ([facility] [address] ICD-10-CM: I61.0  ICD-9-CM: 522               Past Medical History:      has no past medical history on file. Past Surgical History:      has no past surgical history on file. Home Medications:     Prior to Admission medications    Medication Sig Start Date End Date Taking? Authorizing Provider   lisinopriL (PRINIVIL, ZESTRIL) 20 mg tablet Take  by mouth two (2) times a day. Yes Provider, Historical   glipiZIDE SR (GLUCOTROL XL) 10 mg CR tablet Take 5 mg by mouth daily. Yes Provider, Historical   hydroCHLOROthiazide (MICROZIDE) 12.5 mg capsule Take 12.5 mg by mouth daily. Yes Provider, Historical   metFORMIN (GLUCOPHAGE) 1,000 mg tablet Take 1,000 mg by mouth two (2) times daily (with meals).    Yes Provider, Historical   ondansetron (ZOFRAN ODT) 4 mg disintegrating tablet Take 1 Tab by mouth every eight (8) hours as needed for Nausea. 18   Kamlesh Painting DO       Allergies/Social/Family History: Allergies   Allergen Reactions    Pcn [Penicillins] Rash      Social History     Tobacco Use    Smoking status: Not on file   Substance Use Topics    Alcohol use: Not on file      History reviewed. No pertinent family history. Review of Systems:     Unable to provide    Objective:   Vital Signs:  Visit Vitals  /66   Pulse 99   Temp 99.9 °F (37.7 °C)   Resp 25   Ht 5' 7\" (1.702 m) Comment: From documentation on 21   Wt 75.2 kg (165 lb 12.6 oz)   SpO2 94%   BMI 25.97 kg/m²    O2 Flow Rate (L/min): 2 l/min O2 Device: Nasal cannula Temp (24hrs), Av.8 °F (37.7 °C), Min:98.4 °F (36.9 °C), Max:100.6 °F (38.1 °C)           Intake/Output:     Intake/Output Summary (Last 24 hours) at 2021 1024  Last data filed at 2021 1000  Gross per 24 hour   Intake 905.42 ml   Output 545 ml   Net 360.42 ml     Physical Exam:    General: Somnolent, NAD  Eye:  PERRLA, 2mm  Neurologic:Does not follow commands, moves extremities spontaneously L>R  Neck: Supple, no JVD  Lungs: CTAB  Heart: RRR, 2+ pulses, no edema  Abdomen: Soft, nontender, nondistended  Skin:  C/d/i    LABS AND  DATA: Personally reviewed  Recent Labs     21   WBC 17.3* 15.6*   HGB 13.1 14.5   HCT 40.8 44.4    296     Recent Labs     21    135*   K 3.6 4.2    102   CO2 21 22   BUN 26* 21*   CREA 1.14 1.12   * 196*   CA 8.7 8.6   MG 2.5* 2.3   PHOS 2.1* 3.2     Recent Labs     21   AP 84   TP 8.3*   ALB 4.1   GLOB 4.2*     No results for input(s): INR, PTP, APTT, INREXT in the last 72 hours.    Recent Labs     21   PHI 7.39   PCO2I 40.4   PO2I 26*     Recent Labs     21   TROIQ <0.05     MEDS: Reviewed    Chest X-Ray: personally reviewed and report checked    TTE: pending    Assessment: Intracerebral Hemorrhage: L Basal Ganglia Hemorrhage with Intraventricular Extension and midline shift. Non-surgical candidate. Neurology consulted. - Continue keppra 500mg q12h  - Goal SBP <140, wean nicardipine as tolerated  - PT/OT/SLP  - Neuro checks as per neurology/NSGY recs    HTN: Restarted home lisinopril, continue hydralazine, wean nicardipine    DM: SSI    Dysphagia:   - NGT  - Dysphagia  - NST consult for TF    Deconditioning: PT/OT      Multidisciplinary Rounds Completed:  Y    ABCDEF Bundle/Checklist  Pain Medications: None  Target RASS: 0 - Alert & Calm - Spontaneously pays attention to caregiver  Sedation Medications: None  CAM-ICU: LYNSEY  Mobility:Poor  PT/OT: PT consulted and on board and OT consulted and on board   Restraints: Soft wrist restraints  Discussed Plan of Care (goals of care): Yes  Addressed Code Status: Full Code    CARDIOVASCULAR  Cardiac Gtts: Nicardipine (Cardene)  SBP Goal of: > 90 mmHg and < 140 mmHg  MAP Goal of: > 65 mmHg  Transfusion Trigger (Hgb): <7 g/dL    RESPIRATORY  Vent Goals: Incentive spirometry  DVT Prophylaxis (if no, list reason): SCD  SPO2 Goal: > 92%  Pulmonary toilet: Incentive Spirometry     GI/  Torrez Catheter Present: N  GI Prophylaxis: N  Nutrition: TF consult  IVFs: N  Bowel Movement: Pericolace  Bowel Regimen: N  Insulin: SSI    ANTIBIOTICS  Antibiotics:  N    T/L/D  N    SPECIAL EQUIPMENT  N    DISPOSITION  ICU    CRITICAL CARE CONSULTANT NOTE  I had a face to face encounter with the patient, reviewed and interpreted patient data including clinical events, labs, images, vital signs, I/O's, and examined patient.   I have discussed the case and the plan and management of the patient's care with the consulting services, the bedside nurses and the respiratory therapist.      NOTE OF PERSONAL INVOLVEMENT IN CARE   This patient has a high probability of imminent, clinically significant deterioration, which requires the highest level of preparedness to intervene urgently. I participated in the decision-making and personally managed or directed the management of the following life and organ supporting interventions that required my frequent assessment to treat or prevent imminent deterioration. I personally spent 35 minutes of critical care time. This is time spent at this critically ill patient's bedside actively involved in patient care as well as the coordination of care and discussions with the patient's family. This does not include any procedural time which has been billed separately.     Hamida Jenkins NP  [facility]  4/8/2021

## 2021-04-08 NOTE — PROGRESS NOTES
Neurosurgery Progress Note  Silvia Jeffries ACNP-BC          Admit Date: 2021   LOS: 1 day        Daily Progress Note: 2021    HPI:The patient was found unresponsive by family somnolent in his recliner. He was seen normal the day before. The patient's head CT in the ER at [facility] revealed a large 4.8 x 6 x 5.4 cm hemorrhage originating in the left caudate head with intraventricular hemorrhage. He was not on antiplatelet or blood thinning medications per review of his prior to admission medication list. He transferred to [facility] for further evaluation. Subjective:   Pt still very lethargic. Daughter at bedside. Head CT this morning stable. Unable to obtain ROS due to AMS. Objective:     Vital signs  Temp (24hrs), Av.8 °F (37.7 °C), Min:98.4 °F (36.9 °C), Max:100.6 °F (38.1 °C)   701 - 1900  In: 245 [I.V.:200]  Out: 90 [Urine:90]  1901 -  0700  In: 911.3 [I.V.:566.3]  Out: 1025 [Urine:1025]    Visit Vitals  /67   Pulse (!) 104   Temp 99.9 °F (37.7 °C)   Resp 18   Ht 5' 7\" (1.702 m)   Wt 74.8 kg (165 lb)   SpO2 93%   BMI 25.84 kg/m²    O2 Flow Rate (L/min): 2 l/min O2 Device: Nasal cannula     Pain control  Pain Assessment  Pain Scale 1: Adult Nonverbal Pain Scale  Pain Intensity 1: 0  Pain Intervention(s) 1: Medication (see MAR)    PT/OT  Gait                 Physical Exam:  Gen:NAD. Neuro: Lethargic. Does not open eyes to voice or pain. Non-verbal so unable to obtain orientation. Not following commands. Withdraws to pain in all extremities L>R and RLE>RUE. Affect flat. Pupils R 3 mm brisk reaction to light, left pupil 3 mm brisk reaction to light. CT head without contrast on 21 shows large left frontal parenchymal hemorrhage. Subarachnoid extension into the lateral ventricle. Left to right midline shift, left lateral ventricular dilation, and local sulcal effacement in the frontal lobe. Trace subarachnoid hemorrhage in the posterior, right sylvian fissure. 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 CT head without contrast on 04/07/21 shows stable left frontal parenchymal hemorrhage. Stable mass effect.     24 hour results:    Recent Results (from the past 24 hour(s))   GLUCOSE, POC    Collection Time: 04/07/21  1:02 PM   Result Value Ref Range    Glucose (POC) 211 (H) 65 - 100 mg/dL    Performed by [facility]    GLUCOSE, POC    Collection Time: 04/07/21  5:26 PM   Result Value Ref Range    Glucose (POC) 231 (H) 65 - 100 mg/dL    Performed by [facility]    GLUCOSE, POC    Collection Time: 04/07/21 11:27 PM   Result Value Ref Range    Glucose (POC) 219 (H) 65 - 100 mg/dL    Performed by Frank Plant    Collection Time: 04/08/21  4:13 AM   Result Value Ref Range    Magnesium 2.5 (H) 1.6 - 2.4 mg/dL   PHOSPHORUS    Collection Time: 04/08/21  4:13 AM   Result Value Ref Range    Phosphorus 2.1 (L) 2.6 - 4.7 MG/DL   CBC W/O DIFF    Collection Time: 04/08/21  4:13 AM   Result Value Ref Range    WBC 17.3 (H) 4.1 - 11.1 K/uL    RBC 4.51 4.10 - 5.70 M/uL    HGB 13.1 12.1 - 17.0 g/dL    HCT 40.8 36.6 - 50.3 %    MCV 90.5 80.0 - 99.0 FL    MCH 29.0 26.0 - 34.0 PG    MCHC 32.1 30.0 - 36.5 g/dL    RDW 14.7 (H) 11.5 - 14.5 %    PLATELET 844 531 - 960 K/uL    MPV 10.8 8.9 - 12.9 FL    NRBC 0.0 0  WBC    ABSOLUTE NRBC 0.00 0.00 - 1.38 K/uL   METABOLIC PANEL, BASIC    Collection Time: 04/08/21  4:13 AM   Result Value Ref Range    Sodium 136 136 - 145 mmol/L    Potassium 3.6 3.5 - 5.1 mmol/L    Chloride 105 97 - 108 mmol/L    CO2 21 21 - 32 mmol/L    Anion gap 10 5 - 15 mmol/L    Glucose 239 (H) 65 - 100 mg/dL    BUN 26 (H) 6 - 20 MG/DL    Creatinine 1.14 0.70 - 1.30 MG/DL    BUN/Creatinine ratio 23 (H) 12 - 20      GFR est AA >60 >60 ml/min/1.73m2    GFR est non-AA >60 >60 ml/min/1.73m2    Calcium 8.7 8.5 - 10.1 MG/DL   GLUCOSE, POC    Collection Time: 04/08/21  5:41 AM   Result Value Ref Range    Glucose (POC) 234 (H) 65 - 100 mg/dL Performed by Delmar Cabrera, POC    Collection Time: 04/08/21 11:20 AM   Result Value Ref Range    Glucose (POC) 219 (H) 65 - 100 mg/dL    Performed by [facility]           Assessment:     Active Problems:    Basal ganglia hemorrhage ([address] (4/7/2021)        Plan:   1. Basal ganglia hemorrhage   - no surgical intervention   - cont neuro checks   - BP control   - PT/OT/Speech as able   - stroke education   - Neurology consulted   - MRI per neurology    - would give pt a few days to see how he does. Would hold off on palliative care consult until beginning of next week to see if he has any improvement over the weekend. He has suffered a large bleed but is so far stable. Cont supportive care with NG tube feeds and BP anti-hypertensives. 2. Cerebral edema with brain compression   - due to #1   - steroids not indicated   - plans as above  3. Acute metabolic encephalopathy   - mutli-factorial   - supportive care  4. HTN   - SBP<140   - Cardene PRN   - Hydralazine/labetalol PRN   - Intensivist to follow  5. Diabetes mellitus, type 2   - hold PO meds   - SSI and accu-checks   - Intensivist following    Activity: OOB with assist  DVT ppx: SCDs  Dispo: tbd    Plan d/w Dr. Christophe Tyson, ICU nurse, daughter at bedside, neurology. Recommend conservative medical management at this time as patient's bleed is in the dominant basal ganglia. If patient's ventricles were to enlarge, we could consider a ventriculostomy, but not sure family would want to be this aggressive.      I met with daughter who agrees that no surgery is desired or expected  She knows pt is not likely to improve  Will sign off case  Re eval on request  Dr Ursula Zaldivar, NP

## 2021-04-08 NOTE — PROGRESS NOTES
Transition of Care Plan   RUR- Low     DISPOSITION: pending medical progression   F/U with PCP/Specialist     Transport: will likely need BLS transport  Patient admitted with Basal ganglia hemorrhage. Remains in the ICU , patient does not open his eyes to voice, not following commands. On a cardene gtt. Per notes will treat medically. Care management will follow for transitions of care.    Faith Gerardo RN,Care Managment

## 2021-04-08 NOTE — PROGRESS NOTES
Speech Therapy    Entered room with PT/OT to determine readiness for swallow evaluation. Patient sleeping and did not respond despite repositioning, voice, and tactile stimulation. Not appropriate for PO trials at this time. Will continue to follow as appropriate. Ruth Howard M.S., CCC-SLP

## 2021-04-08 NOTE — PROGRESS NOTES
Clinical Pharmacy Note: IV to PO Automatic Conversion  Please note: William Crouch medication(s) (levetiracetam) has/have been changed from IV to PO based on the following critiera:    - Patient is tolerating oral medications  - Patient is tolerating a diet more advanced than clear liquids  - Patient is not requiring vasopressors    This IV to PO conversion is based on the [employer] approved automatic conversion policy for eligible patients. Please call with questions.

## 2021-04-08 NOTE — PROGRESS NOTES
0730: Bedside and Verbal shift change report given to ANDREZ Godfrey (oncoming nurse) by [facility], RN and Won Jain, student nurse (offgoing nurse). Report included the following information SBAR, Kardex, Intake/Output, MAR, Recent Results, Cardiac Rhythm SR, Alarm Parameters  and Dual Neuro Assessment. 1930: Bedside and Verbal shift change report given to Corrina Jasso RN (oncoming nurse) by Brittney Morris RN (offgoing nurse). Report included the following information SBAR, Kardex, Intake/Output, MAR, Recent Results, Cardiac Rhythm SR-ST, Alarm Parameters  and Dual Neuro Assessment.

## 2021-04-08 NOTE — ROUTINE PROCESS
Occupational therapy 7129 -   [id number]    Chart reviewed in prep for OT evaluation. Patient did not follow commands, respond to tactile stimulation or change in position. Patient did withdraw from nailbed pressure on R side. Session deferred. Will f/u tomorrow as able. Thank you     Pelon Connors MS, OTR/L

## 2021-04-09 NOTE — PROGRESS NOTES
1930: Bedside and Verbal shift change report given to [address] [place] [address] (oncoming nurse) by Debborah Holstein (offgoing nurse). Report included the following information SBAR, Kardex, ED Summary, Procedure Summary, MAR, Recent Results and Cardiac Rhythm NSR. Dual Neuro assessment completed with off going Rn.    2000: Shift assessment completed per flowsheet. Patient eyes do no open to any stimuli. PERRLA, 4. Patient spontaneously moves L arm and bilateral lower extremities. R arm withdraws from painful stimuli. Cardene gtt @ 5mg/hr. Patient's temp 101.2F. Ice packs applied to groin and bilateral under arms. 0000: Reassessment completed per flowsheet. Patient's temp 100.9F. PRN 650mg Tylenol given. 0400: Reassessment completed per flowsheet. 0730: Bedside and Verbal shift change report given to Debborah Holstein (oncoming nurse) by [address] [place] [address] (offgoing nurse). Report included the following information SBAR, Kardex, ED Summary, Procedure Summary, MAR, Recent Results and Cardiac Rhythm NSR/ST.     Dual Neuro assessment completed with on coming Rn.

## 2021-04-09 NOTE — PROGRESS NOTES
[facility]    ICU TEAM Progress Note    Name: Dianne Chris   : 1940   MRN: [id number]   Date: 2021      Subjective:   Progress Note: 2021      Mr Harsha Gonzales is an 81 yo male w a PMH of prior cerebellar stroke, DM, and HTN transferred from [facility] to [facility] on  with a large basal ganglia hemorrhage with extension into the L lateral ventricle. He was last known well  in the am.  He was found on  by family, somnolent in his chair. He was then taken to the [facility] ED where CT head found large L parenchymal hemorrhage in the L frontal lobe measuring 4.8x6x5. 4 w midline shift 1.1 cm into the R with trace SAH into the posterior R sylvian fissure. Initial GCS 8-9 and BP 394J systolic. NSGY was consulted and he was transferred to [facility], as noted. He has been managed in the ICU at [facility]. He continues to require cardene for BP control for SBP <140. He has required NGT for dysphagia. He has been started on hydralazine 25mg TID and lisinopril 20mg BID. Nicardipine is being weaned. Active Problem List:     Problem List  Never Reviewed          Codes Class    Basal ganglia hemorrhage ([facility] [address] ICD-10-CM: I61.0  ICD-9-CM: 660               Past Medical History:      has no past medical history on file. Past Surgical History:      has no past surgical history on file. Home Medications:     Prior to Admission medications    Medication Sig Start Date End Date Taking? Authorizing Provider   lisinopriL (PRINIVIL, ZESTRIL) 20 mg tablet Take  by mouth two (2) times a day. Yes Provider, Historical   glipiZIDE SR (GLUCOTROL XL) 10 mg CR tablet Take 5 mg by mouth daily. Yes Provider, Historical   hydroCHLOROthiazide (MICROZIDE) 12.5 mg capsule Take 12.5 mg by mouth daily. Yes Provider, Historical   metFORMIN (GLUCOPHAGE) 1,000 mg tablet Take 1,000 mg by mouth two (2) times daily (with meals).    Yes Provider, Historical   ondansetron (ZOFRAN ODT) 4 mg disintegrating tablet Take 1 Tab by mouth every eight (8) hours as needed for Nausea. 18   Douglas Sarah DO       Allergies/Social/Family History: Allergies   Allergen Reactions    Pcn [Penicillins] Rash      Social History     Tobacco Use    Smoking status: Not on file   Substance Use Topics    Alcohol use: Not on file      History reviewed. No pertinent family history. Review of Systems:     Unable to provide    Objective:   Vital Signs:  Visit Vitals  /69   Pulse 94   Temp (!) 100.6 °F (38.1 °C)   Resp 21   Ht 5' 7\" (1.702 m)   Wt 78.3 kg (172 lb 9.9 oz)   SpO2 92%   BMI 27.04 kg/m²    O2 Flow Rate (L/min): 2 l/min O2 Device: Nasal cannula Temp (24hrs), Av.7 °F (38.2 °C), Min:100 °F (37.8 °C), Max:101.4 °F (38.6 °C)           Intake/Output:     Intake/Output Summary (Last 24 hours) at 2021 1552  Last data filed at 2021 1500  Gross per 24 hour   Intake 3948.34 ml   Output 700 ml   Net 3248.34 ml     Physical Exam:    General: Somnolent, NAD  Eye:  PERRLA, 2mm  Neurologic:Does not follow commands, moves extremities spontaneously L>R  Neck: Supple, no JVD  Lungs: CTAB  Heart: RRR, 2+ pulses, no edema  Abdomen: Soft, nontender, nondistended  Skin:  C/d/i    LABS AND  DATA: Personally reviewed  Recent Labs     21  0433 21   WBC 17.7* 17.3*   HGB 13.9 13.1   HCT 44.0 40.8    261     Recent Labs     21  0433 21  0413    136   K 3.3* 3.6    105   CO2 24 21   BUN 32* 26*   CREA 1.16 1.14   * 239*   CA 8.9 8.7   MG 2.8* 2.5*   PHOS 2.8 2.1*     Recent Labs     21   AP 84   TP 8.3*   ALB 4.1   GLOB 4.2*     No results for input(s): INR, PTP, APTT, INREXT, INREXT in the last 72 hours.    Recent Labs     21   PHI 7.39   PCO2I 40.4   PO2I 26*     Recent Labs     21   TROIQ <0.05     MEDS: Reviewed    Chest X-Ray: personally reviewed and report checked    TTE: pending    Assessment:     Intracerebral Hemorrhage: L Basal Ganglia Hemorrhage with Intraventricular Extension and midline shift. Non-surgical candidate. Neurology consulted. - Continue keppra 500mg q12h  - Goal SBP <140, wean nicardipine as tolerated  - PT/OT/SLP  - Neuro checks as per neurology/NSGY recs    Worsening leukocytosis. -reactive vs infectious source (?aspiration pneumonia)  -sputum Cx growing rare GPC and GNR. MRSA screen is neg. Start rocephin. HTN: Restarted home lisinopril, continue hydralazine, wean nicardipine    DM: SSI    Dysphagia:   - NGT  - Dysphagia  - NST consult for TF    Deconditioning: PT/OT    Had discussion with wife and she wants to change code status to DNR/DNI and  Continue current level of care for now. Multidisciplinary Rounds Completed:  Y    ABCDEF Bundle/Checklist  Pain Medications: None  Target RASS: 0 - Alert & Calm - Spontaneously pays attention to caregiver  Sedation Medications: None  CAM-ICU: LYNSEY  Mobility:Poor  PT/OT: PT consulted and on board and OT consulted and on board   Restraints: Soft wrist restraints  Discussed Plan of Care (goals of care): Yes  Addressed Code Status: Full Code    CARDIOVASCULAR  Cardiac Gtts: Nicardipine (Cardene)  SBP Goal of: > 90 mmHg and < 140 mmHg  MAP Goal of: > 65 mmHg  Transfusion Trigger (Hgb): <7 g/dL    RESPIRATORY  Vent Goals: Incentive spirometry  DVT Prophylaxis (if no, list reason): SCD  SPO2 Goal: > 92%  Pulmonary toilet: Incentive Spirometry     GI/  Torrez Catheter Present: N  GI Prophylaxis: N  Nutrition: TF consult  IVFs: N  Bowel Movement: Pericolace  Bowel Regimen: N  Insulin: SSI    ANTIBIOTICS  Antibiotics:  N    T/L/D  N    SPECIAL EQUIPMENT  N    DISPOSITION  ICU    CRITICAL CARE CONSULTANT NOTE  I had a face to face encounter with the patient, reviewed and interpreted patient data including clinical events, labs, images, vital signs, I/O's, and examined patient.   I have discussed the case and the plan and management of the patient's care with the consulting services, the bedside nurses and the respiratory therapist.      NOTE OF PERSONAL INVOLVEMENT IN CARE   This patient has a high probability of imminent, clinically significant deterioration, which requires the highest level of preparedness to intervene urgently. I participated in the decision-making and personally managed or directed the management of the following life and organ supporting interventions that required my frequent assessment to treat or prevent imminent deterioration. I personally spent 40 minutes of critical care time. This is time spent at this critically ill patient's bedside actively involved in patient care as well as the coordination of care and discussions with the patient's family. This does not include any procedural time which has been billed separately. Maxime Medina MD  [facility]  4/9/2021      Addendum:  -Patient is ok to be transferred to neuro intermediate care unit from neurology team stand point. Has no active critical care needs. I will transfer him to neuro intermediate care unit. Continue serial neuro exams. Wean nicardipine gtt. Increase hydralazine to 50mg TID.  I called and signed out to on call hospitalist.

## 2021-04-09 NOTE — PROGRESS NOTES
Reviewed chart and discussed case with nsg at bedside who reports patient remains unresponsive and not appropriate for swallowing evaluation. Will follow up next week as alertness/mental status allows. Thanks. Faith Sorensen M.CD.  CCC-SLP

## 2021-04-09 NOTE — PROGRESS NOTES
Per Patient's wife, \"He received 1st dose of COVID vaccine, He was scheduled to receive second dose on 4/7/21\"

## 2021-04-09 NOTE — PROGRESS NOTES
0730: Bedside and Verbal shift change report given to ANDREZ Godfrey (oncoming nurse) by Lea Varela RN (offgoing nurse). Report included the following information SBAR, Kardex, Intake/Output, MAR, Recent Results, Cardiac Rhythm SR-ST, Alarm Parameters  and Dual Neuro Assessment. 3030: Patient's yellow gold wedding band given to wife, Maisha Asa     31 75 62: TRANSFER - OUT REPORT:    Verbal report given to Mine Floyd RN(name) on Nu Campos  being transferred to NSTU(unit) for routine progression of care       Report consisted of patients Situation, Background, Assessment and   Recommendations(SBAR). Information from the following report(s) SBAR, Kardex, Intake/Output, MAR, Recent Results, Cardiac Rhythm SR-ST and Alarm Parameters  was reviewed with the receiving nurse. Lines:   Peripheral IV 04/06/21 Right Antecubital (Active)   Site Assessment Clean, dry, & intact 04/09/21 1600   Phlebitis Assessment 0 04/09/21 1600   Infiltration Assessment 0 04/09/21 1600   Dressing Status Clean, dry, & intact 04/09/21 1600   Dressing Type Tape;Transparent 04/09/21 1600   Hub Color/Line Status Pink;Flushed;Capped 04/09/21 1600   Action Taken Open ports on tubing capped 04/09/21 1600   Alcohol Cap Used Yes 04/09/21 1600       Peripheral IV 04/07/21 Anterior;Right Forearm (Active)   Site Assessment Clean, dry, & intact 04/09/21 1600   Phlebitis Assessment 0 04/09/21 1600   Infiltration Assessment 0 04/09/21 1600   Dressing Status Clean, dry, & intact 04/09/21 1600   Dressing Type Tape;Transparent 04/09/21 1600   Hub Color/Line Status Blue; Infusing 04/09/21 1600   Action Taken Open ports on tubing capped 04/09/21 1600   Alcohol Cap Used Yes 04/09/21 1600       Peripheral IV 04/08/21 Left;Posterior Arm (Active)   Site Assessment Clean, dry, & intact 04/09/21 1600   Phlebitis Assessment 0 04/09/21 1600   Infiltration Assessment 0 04/09/21 1600   Dressing Status Clean, dry, & intact 04/09/21 1600   Dressing Type Tape;Transparent 04/09/21 1600   Hub Color/Line Status Pink;Flushed;Capped 04/09/21 1600   Action Taken Open ports on tubing capped 04/09/21 1600   Alcohol Cap Used Yes 04/09/21 1600        Opportunity for questions and clarification was provided. Patient transported with:   Monitor  Registered Nurse  Tech    Receiving RN notified of patient's arrival to room 678, patient's wife shannon updated on transfer out of ICU.

## 2021-04-09 NOTE — PROGRESS NOTES
Neurology Progress Note  Sun Dove NP    Admit Date: 2021   LOS: 2 days      Daily Progress Note: 2021    HPI:Fadi Ross is a 80 y.o. male with a past medical history of HTN who presented to ED [facility] ED on 21 via EMS after his family found him in his chair unresponsive. EMS administered Narcan with no effect. He was last seen normal the day before. In the ED, a stat CT of his head was obtained which showed a parenchymal hemorrhage in the left frontal lobe measuring 48 x 60 x 54 mm with IVH with 11 mm left to right midline shift. There was dilation of the left lateral ventricle. Neurosurgery was consulted, no surgical intervention indicated at this time. BP was 165/103 on arrival to ED [facility], but did get as high as 181/101 while in the ED. He was started on a cardene gtt. He was then transferred to [facility] ICU for a higher level of care. Per patient record, pt does not take any ASA or blood thinners at home. Subjective:     No neuro events overnight. Allergies   Allergen Reactions    Pcn [Penicillins] Rash     Review of Systems:  Review of systems not obtained due to patient factors. Objective:   Vital signs  Temp (24hrs), Av.6 °F (38.1 °C), Min:99.9 °F (37.7 °C), Max:101.4 °F (38.6 °C)   1901 -  0700  In: 992.1 [I.V.:302.1]  Out: 240 [Urine:240]  701 -  190  In: 2003.8 [I.V.:1183.8]  Out: 1055 [Urine:1055]  Visit Vitals  /66   Pulse 100   Temp (!) 100.9 °F (38.3 °C)   Resp 22   Ht 5' 7\" (1.702 m)   Wt 74.8 kg (165 lb)   SpO2 95%   BMI 25.84 kg/m²    O2 Flow Rate (L/min): 2 l/min O2 Device: Nasal cannula   Vitals:    21 2200 21 2300 21 0000 21 0100   BP: 137/66 (!) 140/70 132/73 117/66   Pulse: 98 (!) 105 (!) 106 100   Resp: 16 17 16 22   Temp:   (!) 100.9 °F (38.3 °C)    SpO2: 95% 95% 94% 95%   Weight:       Height:          Physical Exam:  GENERAL: Calm, NAD  SKIN: Warm, dry, color appropriate for ethnicity.    NEURO: Somnolent, no eye opening to noxious stimuli. Does not follow commands. No speech. PERRL, 2 mm bilaterally. Blinks to threat on left but not on right. No disconjugate gaze present. Cannot assess EOM. Spontaneous movement noted in all four extremities, more so on the left, withdraws to painful stimuli in all 4 ext. Bulk and tone normal. Gait deferred. Labs:  Lab Results   Component Value Date/Time    WBC 17.3 (H) 04/08/2021 04:13 AM    HGB 13.1 04/08/2021 04:13 AM    HCT 40.8 04/08/2021 04:13 AM    PLATELET 817 57/68/0290 04:13 AM    MCV 90.5 04/08/2021 04:13 AM     Lab Results   Component Value Date/Time    Sodium 136 04/08/2021 04:13 AM    Potassium 3.6 04/08/2021 04:13 AM    Chloride 105 04/08/2021 04:13 AM    CO2 21 04/08/2021 04:13 AM    Anion gap 10 04/08/2021 04:13 AM    Glucose 239 (H) 04/08/2021 04:13 AM    BUN 26 (H) 04/08/2021 04:13 AM    Creatinine 1.14 04/08/2021 04:13 AM    BUN/Creatinine ratio 23 (H) 04/08/2021 04:13 AM    GFR est AA >60 04/08/2021 04:13 AM    GFR est non-AA >60 04/08/2021 04:13 AM    Calcium 8.7 04/08/2021 04:13 AM     Imaging:  CT Results (maximum last 3): Results from Hospital Encounter encounter on 04/07/21   CT HEAD WO CONT    Narrative HEAD CT WITHOUT CONTRAST: 4/8/2021 1:35 AM    INDICATION: assess hemorrhage    COMPARISON: 4/7/2021, 4/6/2021. MRI brain 4/7/2021. PROCEDURE: Axial images of the head were obtained without contrast. Coronal and  sagittal reformats were performed. CT dose reduction was achieved through use of  a standardized protocol tailored for this examination and automatic exposure  control for dose modulation. FINDINGS: Left frontal parenchymal hemorrhage is minimally decreased. It  measures 45 x 57 x 51 mm, versus 48 x 60 x 54 mm on 4/6/2021. Edema and  petechial hemorrhages surrounded in the left frontal lobe. There is effacement  of the local sulci and stable, 12 mm, left to right midline shift.  Hemorrhage  extends through the wall of the left lateral ventricle, and subarachnoid  hemorrhages layers dependently in the left greater than right lateral  ventricles. This is also stable. Dilation of the left lateral ventricle,  particularly the temporal horn, is stable. A tiny subarachnoid hemorrhage in the posterior right sylvian fissure is more  faint and subtle today (401-58). Trace hyperdensity in a chronic, right inferior cerebellar infarction (2-8)  could represent hemorrhage. However, there was no magnetic susceptibility  artifact within this on yesterday's MRI. Trace subarachnoid hemorrhage extends  through the foramina of L[place]ch[place]. Impression 1. Stable left frontal parenchymal hemorrhage. 2. Stable mass effect. CTA NECK    Narrative EXAM: CTA HEAD, CTA NECK    INDICATION: assess for vascular source of hemorrhage    COMPARISON: CT of the head dated April 7, 2021 and April 6, 2021. CONTRAST: 100 mL of Isovue-370. TECHNIQUE:  Unenhanced  images were obtained to localize the volume for  acquisition. Multislice helical axial CT angiography was performed from the  aortic arch to the top of the head during uneventful rapid bolus intravenous  contrast administration. Coronal and sagittal reformations and 3D post  processing was performed. CT dose reduction was achieved through use of a  standardized protocol tailored for this examination and automatic exposure  control for dose modulation. FINDINGS:    DELAYED ENHANCEMENT HEAD CT  4.8 x 5.8 cm parenchymal hemorrhage centered on the left and frontal lobe and  left basal ganglia is unchanged from yesterday's exam. Prominent surrounding  edema is redemonstrated. Blood layering in the posterior horns of the bilateral  ventricle is unchanged. 11 mm rightward shift of the septum pellucidum is  unchanged. Previous identified small area of right-sided subarachnoid hemorrhage  is not visualized.     The left posterior horn is slightly enlarged, but unchanged from yesterday's  exam. The left frontal horn is effaced. Remote right cerebellar infarct redemonstrated. Dural venous sinuses are patent. No abnormal parenchymal or meningeal  enhancement. Mastoid air cells and paranasal sinuses are clear. CTA NECK  There is conventional three vessel arch anatomy. The bilateral subclavian,  common carotid, and internal carotid arteries are patent with no flow-limiting  stenosis. % of right carotid artery stenosis: 0  % of left carotid artery stenosis: 0  Measurements utilize NASCET criteria. NASCET method was utilized for calculating stenosis. Left vertebral artery is widely patent, the right is patent but somewhat  diminutive. The cervical soft tissues are unremarkable. There are degenerative  changes of the cervical spine. CTA HEAD  Moderate atherosclerotic disease of the bilateral intracranial internal carotid  arteries, without hemodynamically significant stenosis. Focal stenosis of the left A2 segment (602-52), nonocclusive. The anterior communicating artery is patent. The left vertebral artery is patent, with focal moderate atherosclerotic  disease, no hemodynamically significant stenosis. The right vertebral artery is  patent but diminutive. The right PICA is occluded, likely chronic given the  presence of remote infarct. The basilar artery is patent. The bilateral  posterior communicating arteries are diminutive. The bilateral P1 segments are  widely patent. The posterior cerebral arteries are patent. There is a tiny but prominent perforating branch within the area of the left  parenchymal hemorrhage, without aneurysm, active extravasation, or vascular  malformation. Impression 1. Stable left frontal/basal ganglia parenchymal hemorrhage, with surrounding  edema. Associated mass effect, including 11 mm rightward midline shift and  enlargement of the left posterior horn, is unchanged. Bilateral intraventricular  hemorrhage is also unchanged.   2. No aneurysm or any other vascular abnormality. No active extravasation. 3. Chronically occluded right PICA, with associated chronic right cerebellar  infarct. 4. Focal stenosis of the left A2 segment. CTA HEAD    Narrative EXAM: CTA HEAD, CTA NECK    INDICATION: assess for vascular source of hemorrhage    COMPARISON: CT of the head dated April 7, 2021 and April 6, 2021. CONTRAST: 100 mL of Isovue-370. TECHNIQUE:  Unenhanced  images were obtained to localize the volume for  acquisition. Multislice helical axial CT angiography was performed from the  aortic arch to the top of the head during uneventful rapid bolus intravenous  contrast administration. Coronal and sagittal reformations and 3D post  processing was performed. CT dose reduction was achieved through use of a  standardized protocol tailored for this examination and automatic exposure  control for dose modulation. FINDINGS:    DELAYED ENHANCEMENT HEAD CT  4.8 x 5.8 cm parenchymal hemorrhage centered on the left and frontal lobe and  left basal ganglia is unchanged from yesterday's exam. Prominent surrounding  edema is redemonstrated. Blood layering in the posterior horns of the bilateral  ventricle is unchanged. 11 mm rightward shift of the septum pellucidum is  unchanged. Previous identified small area of right-sided subarachnoid hemorrhage  is not visualized. The left posterior horn is slightly enlarged, but unchanged from yesterday's  exam. The left frontal horn is effaced. Remote right cerebellar infarct redemonstrated. Dural venous sinuses are patent. No abnormal parenchymal or meningeal  enhancement. Mastoid air cells and paranasal sinuses are clear. CTA NECK  There is conventional three vessel arch anatomy. The bilateral subclavian,  common carotid, and internal carotid arteries are patent with no flow-limiting  stenosis.     % of right carotid artery stenosis: 0  % of left carotid artery stenosis: 0  Measurements utilize NASCET criteria. NASCET method was utilized for calculating stenosis. Left vertebral artery is widely patent, the right is patent but somewhat  diminutive. The cervical soft tissues are unremarkable. There are degenerative  changes of the cervical spine. CTA HEAD  Moderate atherosclerotic disease of the bilateral intracranial internal carotid  arteries, without hemodynamically significant stenosis. Focal stenosis of the left A2 segment (602-52), nonocclusive. The anterior communicating artery is patent. The left vertebral artery is patent, with focal moderate atherosclerotic  disease, no hemodynamically significant stenosis. The right vertebral artery is  patent but diminutive. The right PICA is occluded, likely chronic given the  presence of remote infarct. The basilar artery is patent. The bilateral  posterior communicating arteries are diminutive. The bilateral P1 segments are  widely patent. The posterior cerebral arteries are patent. There is a tiny but prominent perforating branch within the area of the left  parenchymal hemorrhage, without aneurysm, active extravasation, or vascular  malformation. Impression 1. Stable left frontal/basal ganglia parenchymal hemorrhage, with surrounding  edema. Associated mass effect, including 11 mm rightward midline shift and  enlargement of the left posterior horn, is unchanged. Bilateral intraventricular  hemorrhage is also unchanged. 2. No aneurysm or any other vascular abnormality. No active extravasation. 3. Chronically occluded right PICA, with associated chronic right cerebellar  infarct. 4. Focal stenosis of the left A2 segment.      Assessment:   Active Problems:    Basal ganglia hemorrhage (HCC) (4/7/2021)      Plan:     Intracerebral Hemorrhage, ICH score: 4              - CT head showed parenchymal hemorrhage in the left frontal lobe measures 48 x 60 x 54 mm with IVH              - CTA negative for vascular source of hemorrhage - SBP goal less than 140, Cardene/Labetalol PRN              - Keppra 500 BID for seizure ppx              - q1 neuro checks               - A1C  6.4, diabetes managed by Intensivist.    - Lipid panel showing LDL of 69, at goal <70              - MRI brain showed few punctate foci of acute infarction in the parasagittal left frontoparietal lobe and left lateral  frontal lobe              - ECHO LVEF is 60-65%, moderate concentric hypertrophy. No atrial septal defect. Atrial fibrillation observed. Valves not well visualized.               - PT/OT/SLP evals              - Stroke education when appropriate              - NSGY consulted, no surgical intervention at this time     I have discussed the diagnosis and the intended plan as seen in the above orders with Dr. Yobany Snowden, further recommendations to follow.      Le Ridley NP  Neurocritical Care Nurse Practitioner

## 2021-04-09 NOTE — PROGRESS NOTES
Problem: Falls - Risk of  Goal: *Absence of Falls  Description: Document Marybeth Marshlal Fall Risk and appropriate interventions in the flowsheet. Outcome: Progressing Towards Goal  Note: Fall Risk Interventions:  Mobility Interventions: Communicate number of staff needed for ambulation/transfer    Mentation Interventions: Adequate sleep, hydration, pain control, Door open when patient unattended, Evaluate medications/consider consulting pharmacy, More frequent rounding, Reorient patient, Room close to nurse's station, Update white board    Medication Interventions: Evaluate medications/consider consulting pharmacy    Elimination Interventions: Toileting schedule/hourly rounds    History of Falls Interventions: Door open when patient unattended, Evaluate medications/consider consulting pharmacy, Room close to nurse's station         Problem: Pressure Injury - Risk of  Goal: *Prevention of pressure injury  Description: Document Desmond Scale and appropriate interventions in the flowsheet. Outcome: Progressing Towards Goal  Note: Pressure Injury Interventions:  Sensory Interventions: Assess changes in LOC, Assess need for specialty bed, Avoid rigorous massage over bony prominences, Check visual cues for pain, Float heels, Keep linens dry and wrinkle-free, Minimize linen layers, Monitor skin under medical devices, Pressure redistribution bed/mattress (bed type), Turn and reposition approx.  every two hours (pillows and wedges if needed)    Moisture Interventions: Absorbent underpads, Apply protective barrier, creams and emollients, Assess need for specialty bed, Check for incontinence Q2 hours and as needed, Internal/External urinary devices, Minimize layers    Activity Interventions: Assess need for specialty bed, Pressure redistribution bed/mattress(bed type)    Mobility Interventions: Assess need for specialty bed, Float heels, HOB 30 degrees or less, Pressure redistribution bed/mattress (bed type), Turn and reposition approx.  every two hours(pillow and wedges)    Nutrition Interventions: Document food/fluid/supplement intake, Offer support with meals,snacks and hydration    Friction and Shear Interventions: Foam dressings/transparent film/skin sealants, HOB 30 degrees or less, Minimize layers, Transferring/repositioning devices                Problem: Non-Violent Restraints  Goal: Removal from restraints as soon as assessed to be safe  Outcome: Progressing Towards Goal  Goal: No harm/injury to patient while restraints in use  Outcome: Progressing Towards Goal  Goal: Patient's dignity will be maintained  Outcome: Progressing Towards Goal  Goal: Patient Interventions  Outcome: Progressing Towards Goal     Problem: Nutrition Deficit  Goal: *Optimize nutritional status  Outcome: Progressing Towards Goal     Problem: Hemorrhagic Stroke: Admission Day (0-3 hours)  Goal: Activity/Safety  Outcome: Progressing Towards Goal  Goal: Consults, if ordered  Outcome: Progressing Towards Goal  Goal: Diagnostic Test/Procedures  Outcome: Progressing Towards Goal  Goal: Nutrition/Diet  Outcome: Progressing Towards Goal  Goal: Medications  Outcome: Progressing Towards Goal  Goal: Respiratory  Outcome: Progressing Towards Goal  Goal: Treatments/Interventions/Procedures  Outcome: Progressing Towards Goal  Goal: *Establish/diagnose type of stroke  Outcome: Progressing Towards Goal  Goal: *Patient maintains clear airway/free of aspiration  Outcome: Progressing Towards Goal  Goal: *Hemodynamically stable  Outcome: Progressing Towards Goal     Problem: Hemorrhagic Stroke:  3-24 hours  Goal: Activity/Safety  Outcome: Progressing Towards Goal  Goal: Consults, if ordered  Outcome: Progressing Towards Goal  Goal: Diagnostic Test/Procedures  Outcome: Progressing Towards Goal  Goal: Nutrition/Diet  Outcome: Progressing Towards Goal  Goal: Medications  Outcome: Progressing Towards Goal  Goal: Respiratory  Outcome: Progressing Towards Goal  Goal: Treatments/Interventions/Procedures  Outcome: Progressing Towards Goal  Goal: *Hemodynamically stable  Outcome: Progressing Towards Goal  Goal: *Absence of aspiration  Outcome: Progressing Towards Goal  Goal: *Absence of signs and symptoms of DVT  Outcome: Progressing Towards Goal  Goal: *Optimal pain control at patient's stated goal  Outcome: Progressing Towards Goal  Goal: *Tolerating diet  Outcome: Progressing Towards Goal     Problem: Hemorrhagic Stroke: Day 2  Goal: Activity/Safety  Outcome: Progressing Towards Goal  Goal: Consults, if ordered  Outcome: Progressing Towards Goal  Goal: Diagnostic Test/Procedures  Outcome: Progressing Towards Goal  Goal: Nutrition/Diet  Outcome: Progressing Towards Goal  Goal: Medications  Outcome: Progressing Towards Goal  Goal: Respiratory  Outcome: Progressing Towards Goal  Goal: Treatments/Interventions/Procedures  Outcome: Progressing Towards Goal  Goal: *Hemodynamically stable  Outcome: Progressing Towards Goal  Goal: *Absence of aspiration  Outcome: Progressing Towards Goal  Goal: *Absence of signs and symptoms of DVT  Outcome: Progressing Towards Goal  Goal: *Tolerating diet  Outcome: Progressing Towards Goal     Problem: Hemorrhagic Stroke: Day 3  Goal: Activity/Safety  Outcome: Progressing Towards Goal  Goal: Consults, if ordered  Outcome: Progressing Towards Goal  Goal: Diagnostic Test/Procedures  Outcome: Progressing Towards Goal  Goal: Nutrition/Diet  Outcome: Progressing Towards Goal  Goal: Medications  Outcome: Progressing Towards Goal  Goal: Respiratory  Outcome: Progressing Towards Goal  Goal: Treatments/Interventions/Procedures  Outcome: Progressing Towards Goal  Goal: *Hemodynamically stable  Outcome: Progressing Towards Goal  Goal: *Absence of aspiration  Outcome: Progressing Towards Goal  Goal: *Absence of signs and symptoms of DVT  Outcome: Progressing Towards Goal  Goal: *Tolerating diet  Outcome: Progressing Towards Goal     Problem: Hemorrhagic Stroke: Discharge Outcomes  Goal: *Hemodynamically stable  Outcome: Progressing Towards Goal  Goal: *Absence of aspiration pneumonia  Outcome: Progressing Towards Goal  Goal: *Tolerating diet  Outcome: Progressing Towards Goal  Goal: *Absence of signs and symptoms of DVT  Outcome: Progressing Towards Goal  Goal: *Absence of aspiration  Outcome: Progressing Towards Goal

## 2021-04-09 NOTE — PROGRESS NOTES
Physical Therapy: hold    Chart reviewed, consulted with RN, who advised patient is unresponsive and not following commands. Will continue to hold for PT and will f/u as able and appropriate when patient is able to participate in therapy.     Anita Fregoso, PT, DPT

## 2021-04-09 NOTE — PROGRESS NOTES
Occupational Therapy: hold    Chart reviewed, consulted with RN, who advised patient is unresponsive and not following commands. Will continue to hold for OT and will f/u as able and appropriate when patient is able to participate in therapy.     Rosemarie Schroeder, OTR/L

## 2021-04-09 NOTE — H&P
[address] [place] [address] Adult  Hospitalist Group     ICU Transfer/Accept Summary     This patient is being [place] [address] ICU  DATE OF TRANSFER: 4/9/2021       PATIENT ID: Wisam Miles  MRN: [id number]   YOB: 1940    PRIMARY CARE PROVIDER: Cheryl Boggs MD   DATE OF ADMISSION: 4/7/2021  1:29 AM    ATTENDING PHYSICIAN: Master Hall MD  CONSULTATIONS:   IP CONSULT TO NEUROLOGY    PROCEDURES/SURGERIES:   * No surgery found *    REASON FOR ADMISSION: Intracranial hemorrhage    HOSPITAL PROBLEM LIST:  Patient Active Problem List   Diagnosis Code    Basal ganglia hemorrhage ([facility] [address] I61.0         Brief HPI and Hospital Course:    History and physical was obtained from reviewing his chart and examination of the patient. Patient is not verbal at this time. patient is a 51-year-old male past medical history of type 2 diabetes mellitus, hypertension, who was brought to the emergency department on April 6, 2021 with altered mental status. Baseline patient is well oriented in time place and person. Patient was seen normal on April 5. Patient was found to be somnolent and sleeping in his recliner at the a.m. of April 6. Patient received Narcan on the way to emergency without any changes in his mentation patient was taken to the [id number] [place] [address] ER, CT scan showed large intracranial parenchymal hemorrhage. Patient was transferred to [facility] on April 7. Patient was seen by neurosurgeons and neurologist.  Patient is not a surgical candidate as per neurosurgeons. At ICU, patient noted Cardene to control his blood pressure, and NG tube for dysphagia. Patient CODE STATUS was changed to DNR/DNI today. We were asked to see the patient for transfer out of ICU. Assessment and Plan:  Intra cerebral hemorrhage:  CT showed left frontal lobe hemorrhage measuring 48 x 60 x 54 mm breaking into lateral ventricle. Continue Keppra. Goal of systolic blood pressure is maximum 140.    Patient is on hydralazine lisinopril. Patient takes lisinopril and hydrochlorothiazide at home continue. Cardene as necessary. Continue neurochecks as per protocol. Continue PT/ OT/SLT as able. Diabetes mellitus type 2  Blood sugar is uncontrolled. Continue to hold glipizide and Metformin. Will increase the sliding scale to every 4 hour. Leukocytosis  With fever. Etiology unknown. Possibly aspiration. Continue ceftriaxone for now and monitor. Fluid/electrolytes/nutrition:  Patient is n.p.o. He is on tube feeding with Glucerna. I talked to ICU attending and patient RN. As per the conversation with the family and other attendings, patient long-term prognosis is poor. Patient family decided DNR/DNI today. He patient son would like to see the patient on Sunday, as he wants to give his father sometime to see if his father gets better. If he does not get better possibly consulting palliative care and hospice care will be an option. PHYSICAL EXAMINATION:  Visit Vitals  /71   Pulse 100   Temp 99.8 °F (37.7 °C)   Resp 16   Ht 5' 7\" (1.702 m)   Wt 78.3 kg (172 lb 9.9 oz)   SpO2 93%   BMI 27.04 kg/m²       General:          Not alert, somnolent. ss  HEENT:           Atraumatic. Lungs:            Rales and crackles to auscultation bilaterally. No Wheezing. Heart:               Tachycardic 100. Regular  rhythm,  No  murmur   No edema  Abdomen:       Soft. Not distended. Bowel sounds normal  Extremities:     No cyanosis and no edema. Skin:                Not pale. Not Jaundiced  No rashes     Neurologic:       Patient is somnolent. He opens his eyes and moans on rubbing his sternum.    Labs:     Recent Labs     04/09/21 0433 04/08/21 0413   WBC 17.7* 17.3*   HGB 13.9 13.1   HCT 44.0 40.8    261     Recent Labs     04/09/21 0433 04/08/21 0413 04/07/21 0225    136 135*   K 3.3* 3.6 4.2    105 102   CO2 24 21 22   BUN 32* 26* 21*   CREA 1.16 1.14 1.12   GLU 223* 239* 196*   CA 8.9 8.7 8.6   MG 2.8* 2.5* 2.3   PHOS 2.8 2.1* 3.2     Recent Labs     04/06/21 2024   ALT 13   AP 84   TBILI 0.5   TP 8.3*   ALB 4.1   GLOB 4.2*     No results for input(s): INR, PTP, APTT, INREXT in the last 72 hours. No results for input(s): FE, TIBC, PSAT, FERR in the last 72 hours. No results found for: FOL, RBCF   No results for input(s): PH, PCO2, PO2 in the last 72 hours.   Recent Labs     04/06/21 2024   TROIQ <0.05     Lab Results   Component Value Date/Time    Cholesterol, total 160 04/07/2021 02:25 AM    HDL Cholesterol 60 04/07/2021 02:25 AM    LDL, calculated 69 04/07/2021 02:25 AM    Triglyceride 155 (H) 04/07/2021 02:25 AM    CHOL/HDL Ratio 2.7 04/07/2021 02:25 AM     Lab Results   Component Value Date/Time    Glucose (POC) 240 (H) 04/09/2021 05:11 PM    Glucose (POC) 262 (H) 04/09/2021 12:17 PM    Glucose (POC) 287 (H) 04/09/2021 06:16 AM    Glucose (POC) 236 (H) 04/09/2021 12:04 AM    Glucose (POC) 220 (H) 04/08/2021 05:07 PM     Lab Results   Component Value Date/Time    Color YELLOW/STRAW 04/06/2021 08:29 PM    Appearance CLEAR 04/06/2021 08:29 PM    Specific gravity 1.021 04/06/2021 08:29 PM    pH (UA) 5.5 04/06/2021 08:29 PM    Protein TRACE (A) 04/06/2021 08:29 PM    Glucose Negative 04/06/2021 08:29 PM    Ketone 80 (A) 04/06/2021 08:29 PM    Bilirubin NEGATIVE  08/28/2018 10:46 PM    Urobilinogen 0.2 04/06/2021 08:29 PM    Nitrites Negative 04/06/2021 08:29 PM    Leukocyte Esterase Negative 04/06/2021 08:29 PM    Epithelial cells FEW 04/06/2021 08:29 PM    Bacteria Negative 04/06/2021 08:29 PM    WBC 0-4 04/06/2021 08:29 PM    RBC 0-5 04/06/2021 08:29 PM         CODE STATUS:   Full Code   X DNR    Partial    Comfort Care       Signed:   Tiesha Orozco MD  Date of Service:  4/9/2021  6:03 PM

## 2021-04-10 NOTE — PROGRESS NOTES
Bedside shift change report given to [address] (oncoming nurse) by Mike Gann RN (offgoing nurse). Report included the following information {SBAR REPORTS [id number]}.

## 2021-04-10 NOTE — PROGRESS NOTES
Neurology Progress Note  Markel Osborn NP    Admit Date: 2021   LOS: 3 days      Daily Progress Note: 4/10/2021    HPI:Fadi Wood is a 80 y.o. male with a past medical history of HTN who presented to ED [facility] ED on 21 via EMS after his family found him in his chair unresponsive. EMS administered Narcan with no effect. He was last seen normal the day before. In the ED, a stat CT of his head was obtained which showed a parenchymal hemorrhage in the left frontal lobe measuring 48 x 60 x 54 mm with IVH with 11 mm left to right midline shift. There was dilation of the left lateral ventricle. Neurosurgery was consulted, no surgical intervention indicated at this time. BP was 165/103 on arrival to ED [facility], but did get as high as 181/101 while in the ED. He was started on a cardene gtt. He was then transferred to [facility] ICU for a higher level of care. Per patient record, pt does not take any ASA or blood thinners at home. Subjective:     No neuro events overnight. Patient moved down to NSTU last night. Still on Cardene for BP. Allergies   Allergen Reactions    Pcn [Penicillins] Rash     Review of Systems:  Review of systems not obtained due to patient factors. Objective:   Vital signs  Temp (24hrs), Av °F (37.8 °C), Min:99.5 °F (37.5 °C), Max:100.6 °F (38.1 °C)   No intake/output data recorded.    07 -  1900  In: 5303.4 [I.V.:2308.4]  Out: 1060 [Urine:1060]  Visit Vitals  BP (!) 142/70   Pulse (!) 108   Temp 99.9 °F (37.7 °C)   Resp 21   Ht 5' 7\" (1.702 m)   Wt 78.3 kg (172 lb 9.9 oz)   SpO2 94%   BMI 27.04 kg/m²    O2 Flow Rate (L/min): 2 l/min O2 Device: Nasal cannula   Vitals:    21 1932 21 2104 21 2215 21 2244   BP: (!) 148/72 120/78 130/78 (!) 142/70   Pulse: 96 (!) 102 (!) 106 (!) 108   Resp: 16  21    Temp: 99.5 °F (37.5 °C)  99.9 °F (37.7 °C)    SpO2: 94%  94%    Weight:       Height:          Physical Exam:  GENERAL: Calm, NAD  SKIN: Warm, dry, color appropriate for ethnicity. NEURO: Somnolent, no eye opening to noxious stimuli. Does not follow commands. No speech. PERRL, 2 mm bilaterally. Blinks to threat on left but not on right. No disconjugate gaze present. Cannot assess EOM. Spontaneous movement noted in all four extremities, more so on the left, withdraws to painful stimuli in all 4 ext. Bulk and tone normal. Gait deferred. Labs:  Lab Results   Component Value Date/Time    WBC 17.7 (H) 04/09/2021 04:33 AM    HGB 13.9 04/09/2021 04:33 AM    HCT 44.0 04/09/2021 04:33 AM    PLATELET 310 67/76/5022 04:33 AM    MCV 91.5 04/09/2021 04:33 AM     Lab Results   Component Value Date/Time    Sodium 140 04/09/2021 04:33 AM    Potassium 3.3 (L) 04/09/2021 04:33 AM    Chloride 107 04/09/2021 04:33 AM    CO2 24 04/09/2021 04:33 AM    Anion gap 9 04/09/2021 04:33 AM    Glucose 223 (H) 04/09/2021 04:33 AM    BUN 32 (H) 04/09/2021 04:33 AM    Creatinine 1.16 04/09/2021 04:33 AM    BUN/Creatinine ratio 28 (H) 04/09/2021 04:33 AM    GFR est AA >60 04/09/2021 04:33 AM    GFR est non-AA >60 04/09/2021 04:33 AM    Calcium 8.9 04/09/2021 04:33 AM     Imaging:  CT Results (maximum last 3): Results from Hospital Encounter encounter on 04/07/21   CT HEAD WO CONT    Narrative HEAD CT WITHOUT CONTRAST: 4/8/2021 1:35 AM    INDICATION: assess hemorrhage    COMPARISON: 4/7/2021, 4/6/2021. MRI brain 4/7/2021. PROCEDURE: Axial images of the head were obtained without contrast. Coronal and  sagittal reformats were performed. CT dose reduction was achieved through use of  a standardized protocol tailored for this examination and automatic exposure  control for dose modulation. FINDINGS: Left frontal parenchymal hemorrhage is minimally decreased. It  measures 45 x 57 x 51 mm, versus 48 x 60 x 54 mm on 4/6/2021. Edema and  petechial hemorrhages surrounded in the left frontal lobe. There is effacement  of the local sulci and stable, 12 mm, left to right midline shift. Hemorrhage  extends through the wall of the left lateral ventricle, and subarachnoid  hemorrhages layers dependently in the left greater than right lateral  ventricles. This is also stable. Dilation of the left lateral ventricle,  particularly the temporal horn, is stable. A tiny subarachnoid hemorrhage in the posterior right sylvian fissure is more  faint and subtle today (401-58). Trace hyperdensity in a chronic, right inferior cerebellar infarction (2-8)  could represent hemorrhage. However, there was no magnetic susceptibility  artifact within this on yesterday's MRI. Trace subarachnoid hemorrhage extends  through the foramina of Luschka. Impression 1. Stable left frontal parenchymal hemorrhage. 2. Stable mass effect. CTA NECK    Narrative EXAM: CTA HEAD, CTA NECK    INDICATION: assess for vascular source of hemorrhage    COMPARISON: CT of the head dated April 7, 2021 and April 6, 2021. CONTRAST: 100 mL of Isovue-370. TECHNIQUE:  Unenhanced  images were obtained to localize the volume for  acquisition. Multislice helical axial CT angiography was performed from the  aortic arch to the top of the head during uneventful rapid bolus intravenous  contrast administration. Coronal and sagittal reformations and 3D post  processing was performed. CT dose reduction was achieved through use of a  standardized protocol tailored for this examination and automatic exposure  control for dose modulation. FINDINGS:    DELAYED ENHANCEMENT HEAD CT  4.8 x 5.8 cm parenchymal hemorrhage centered on the left and frontal lobe and  left basal ganglia is unchanged from yesterday's exam. Prominent surrounding  edema is redemonstrated. Blood layering in the posterior horns of the bilateral  ventricle is unchanged. 11 mm rightward shift of the septum pellucidum is  unchanged. Previous identified small area of right-sided subarachnoid hemorrhage  is not visualized.     The left posterior horn is slightly enlarged, but unchanged from yesterday's  exam. The left frontal horn is effaced. Remote right cerebellar infarct redemonstrated. Dural venous sinuses are patent. No abnormal parenchymal or meningeal  enhancement. Mastoid air cells and paranasal sinuses are clear. CTA NECK  There is conventional three vessel arch anatomy. The bilateral subclavian,  common carotid, and internal carotid arteries are patent with no flow-limiting  stenosis. % of right carotid artery stenosis: 0  % of left carotid artery stenosis: 0  Measurements utilize NASCET criteria. NASCET method was utilized for calculating stenosis. Left vertebral artery is widely patent, the right is patent but somewhat  diminutive. The cervical soft tissues are unremarkable. There are degenerative  changes of the cervical spine. CTA HEAD  Moderate atherosclerotic disease of the bilateral intracranial internal carotid  arteries, without hemodynamically significant stenosis. Focal stenosis of the left A2 segment (602-52), nonocclusive. The anterior communicating artery is patent. The left vertebral artery is patent, with focal moderate atherosclerotic  disease, no hemodynamically significant stenosis. The right vertebral artery is  patent but diminutive. The right PICA is occluded, likely chronic given the  presence of remote infarct. The basilar artery is patent. The bilateral  posterior communicating arteries are diminutive. The bilateral P1 segments are  widely patent. The posterior cerebral arteries are patent. There is a tiny but prominent perforating branch within the area of the left  parenchymal hemorrhage, without aneurysm, active extravasation, or vascular  malformation. Impression 1. Stable left frontal/basal ganglia parenchymal hemorrhage, with surrounding  edema. Associated mass effect, including 11 mm rightward midline shift and  enlargement of the left posterior horn, is unchanged.  Bilateral intraventricular  hemorrhage is also unchanged. 2. No aneurysm or any other vascular abnormality. No active extravasation. 3. Chronically occluded right PICA, with associated chronic right cerebellar  infarct. 4. Focal stenosis of the left A2 segment. CTA HEAD    Narrative EXAM: CTA HEAD, CTA NECK    INDICATION: assess for vascular source of hemorrhage    COMPARISON: CT of the head dated April 7, 2021 and April 6, 2021. CONTRAST: 100 mL of Isovue-370. TECHNIQUE:  Unenhanced  images were obtained to localize the volume for  acquisition. Multislice helical axial CT angiography was performed from the  aortic arch to the top of the head during uneventful rapid bolus intravenous  contrast administration. Coronal and sagittal reformations and 3D post  processing was performed. CT dose reduction was achieved through use of a  standardized protocol tailored for this examination and automatic exposure  control for dose modulation. FINDINGS:    DELAYED ENHANCEMENT HEAD CT  4.8 x 5.8 cm parenchymal hemorrhage centered on the left and frontal lobe and  left basal ganglia is unchanged from yesterday's exam. Prominent surrounding  edema is redemonstrated. Blood layering in the posterior horns of the bilateral  ventricle is unchanged. 11 mm rightward shift of the septum pellucidum is  unchanged. Previous identified small area of right-sided subarachnoid hemorrhage  is not visualized. The left posterior horn is slightly enlarged, but unchanged from yesterday's  exam. The left frontal horn is effaced. Remote right cerebellar infarct redemonstrated. Dural venous sinuses are patent. No abnormal parenchymal or meningeal  enhancement. Mastoid air cells and paranasal sinuses are clear. CTA NECK  There is conventional three vessel arch anatomy. The bilateral subclavian,  common carotid, and internal carotid arteries are patent with no flow-limiting  stenosis.     % of right carotid artery stenosis: 0  % of left carotid artery stenosis: 0  Measurements utilize NASCET criteria. NASCET method was utilized for calculating stenosis. Left vertebral artery is widely patent, the right is patent but somewhat  diminutive. The cervical soft tissues are unremarkable. There are degenerative  changes of the cervical spine. CTA HEAD  Moderate atherosclerotic disease of the bilateral intracranial internal carotid  arteries, without hemodynamically significant stenosis. Focal stenosis of the left A2 segment (602-52), nonocclusive. The anterior communicating artery is patent. The left vertebral artery is patent, with focal moderate atherosclerotic  disease, no hemodynamically significant stenosis. The right vertebral artery is  patent but diminutive. The right PICA is occluded, likely chronic given the  presence of remote infarct. The basilar artery is patent. The bilateral  posterior communicating arteries are diminutive. The bilateral P1 segments are  widely patent. The posterior cerebral arteries are patent. There is a tiny but prominent perforating branch within the area of the left  parenchymal hemorrhage, without aneurysm, active extravasation, or vascular  malformation. Impression 1. Stable left frontal/basal ganglia parenchymal hemorrhage, with surrounding  edema. Associated mass effect, including 11 mm rightward midline shift and  enlargement of the left posterior horn, is unchanged. Bilateral intraventricular  hemorrhage is also unchanged. 2. No aneurysm or any other vascular abnormality. No active extravasation. 3. Chronically occluded right PICA, with associated chronic right cerebellar  infarct. 4. Focal stenosis of the left A2 segment.      Assessment:   Active Problems:    Basal ganglia hemorrhage (HCC) (4/7/2021)      Plan:     Intracerebral Hemorrhage, ICH score: 4              - CT head showed parenchymal hemorrhage in the left frontal lobe measures 48 x 60 x 54 mm with IVH - CTA negative for vascular source of hemorrhage              - SBP goal less than 140, Cardene/Labetalol PRN              - Keppra 500 BID for seizure ppx              - q 2 hour neuro checks               - A1C  6.4, diabetes managed by primary team. Diabetic ed before d/c.    -  LDL of 69, at goal <70              - MRI brain showed few punctate foci of acute infarction in the parasagittal left frontoparietal lobe and left lateral frontal lobe              - ECHO LVEF is 60-65%, moderate concentric hypertrophy. No atrial septal defect. Atrial fibrillation observed. Valves not well visualized.               - PT/OT/SLP evals              - Stroke education when appropriate              - NSGY consulted, no surgical intervention at this time     I have discussed the diagnosis and the intended plan as seen in the above orders with Dr. Juve Hinton, further recommendations to follow. Madeleine Simpson NP  Neurocritical Care Nurse Practitioner    Staff Addendum:  I have reviewed the documentation provided by the nurse practitioner, discussed her findings, clinical impression, and the proposed management plans with regards to this encounter. I have personally evaluated the patient and verified the history and confirmed the physical findings. Below are my additional findings:    79yo male found non-responsive 4/6/21 with evidence of left frontal lobe ICH, SAH/IVH likely hypertensive. No vascular malformation noted on CTA H/N. Considering Hospice care if no improvement over weekend. He remains somnolent on examination without command following, some spontaneous movements of the LUE/LLE, R-HP, w/d x 4 weaker on the right, R FD, PERRL. Will maintain SBP<140 and continue LEV for seizure prophylaxis. Please call if further questions/concerns.     Alessia Houston,   04/10/21

## 2021-04-10 NOTE — PROGRESS NOTES
Problem: Falls - Risk of  Goal: *Absence of Falls  Description: Document Benny Schimke Fall Risk and appropriate interventions in the flowsheet. Outcome: Progressing Towards Goal  Note: Fall Risk Interventions:  Mobility Interventions: Communicate number of staff needed for ambulation/transfer    Mentation Interventions: Adequate sleep, hydration, pain control, Bed/chair exit alarm, Door open when patient unattended, Evaluate medications/consider consulting pharmacy, More frequent rounding, Room close to nurse's station    Medication Interventions: Evaluate medications/consider consulting pharmacy    Elimination Interventions: Bed/chair exit alarm, Toileting schedule/hourly rounds    History of Falls Interventions: Bed/chair exit alarm, Door open when patient unattended, Evaluate medications/consider consulting pharmacy, Room close to nurse's station         Problem: Pressure Injury - Risk of  Goal: *Prevention of pressure injury  Description: Document Desmond Scale and appropriate interventions in the flowsheet. Outcome: Progressing Towards Goal  Note: Pressure Injury Interventions:  Sensory Interventions: Assess changes in LOC, Assess need for specialty bed, Check visual cues for pain, Float heels, Keep linens dry and wrinkle-free, Turn and reposition approx. every two hours (pillows and wedges if needed)    Moisture Interventions: Absorbent underpads, Apply protective barrier, creams and emollients, Assess need for specialty bed, Check for incontinence Q2 hours and as needed, Internal/External urinary devices    Activity Interventions: Pressure redistribution bed/mattress(bed type)    Mobility Interventions: Float heels, Pressure redistribution bed/mattress (bed type), Turn and reposition approx.  every two hours(pillow and wedges)    Nutrition Interventions: Document food/fluid/supplement intake    Friction and Shear Interventions: Apply protective barrier, creams and emollients, Lift team/patient mobility team Problem: Hemorrhagic Stroke: Day 3  Goal: Medications  Outcome: Progressing Towards Goal  Goal: Treatments/Interventions/Procedures  Outcome: Progressing Towards Goal  Goal: *Hemodynamically stable  Outcome: Progressing Towards Goal  Goal: *Absence of aspiration  Outcome: Progressing Towards Goal  Goal: *Absence of signs and symptoms of DVT  Outcome: Progressing Towards Goal  Goal: *Tolerating diet  Outcome: Progressing Towards Goal

## 2021-04-10 NOTE — PROGRESS NOTES
Bedside and Verbal shift change report given to Sunny Wiggins, RN and ANDREZ VALENTE (oncoming nurse) by Basil Purvis RN (offgoing nurse). Report included the following information SBAR, Kardex, Intake/Output, MAR, Recent Results, Med Rec Status, Cardiac Rhythm SR/ST and Dual Neuro Assessment.

## 2021-04-10 NOTE — H&P
.  [address] [facility]  Hospitalist Group  Ju Dewey MD  Phone/pager: [phone]      Daily progress note   Transferred out of the ICU on 4/9/2021    Admission summary and hospital course. History and physical was obtained from reviewing his chart and examination of the patient. Patient is not verbal at this time. patient is a 80-year-old male past medical history of type 2 diabetes mellitus, hypertension, who was brought to the emergency department on April 6, 2021 with altered mental status. Baseline patient is well oriented in time place and person. Patient was seen normal on April 5. Patient was found to be somnolent and sleeping in his recliner at the a.m. of April 6. Patient received Narcan on the way to emergency without any changes in his mentation patient was taken to the [facility] ER, CT scan showed large intracranial parenchymal hemorrhage. Patient was transferred to [facility] on April 7. Patient was seen by neurosurgeons and neurologist.  Patient is not a surgical candidate as per neurosurgeons. At ICU, patient noted Cardene to control his blood pressure, and NG tube for dysphagia. Patient CODE STATUS was changed to DNR/DNI. Transferred out of the ICU on 4/9. Subjective complaints/interim history     Patient is not responsive, only withdraws for tactile stimuli. He is not in distress. 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 Acute metabolic encephalopathy due to the above  -Being managed conservatively. Seen by neurology neurosurgery. -BP management to keep SBP<140  -Seizure prophylaxis with Keppra  -Neurochecks.  -Seizure and aspiration precautions      Diabetes mellitus type 2, fairly controlled with hemoglobin A1c of 6.4 now with hyperglycemia.   Home regimen includes glipizide SR and Metformin.  -Accu-Cheks along with Humalog sliding scale every 6  -Increase dose of Lantus today. Leukocytosis, low-grade fever on 4/9, aspiration suspected. Not septic or toxic.  -Continue ceftriaxone. Dysphagia  -Getting nutrition via NGT      CODE STATUS: DNR  DVT prophylaxis: SCD  Disposition: To be determined  Prognosis: Poor     Review of systems: Unobtainable, patient nonresponsive. PHYSICAL EXAMINATION:  Visit Vitals  BP (!) 140/74   Pulse 100   Temp 98.9 °F (37.2 °C)   Resp 18   Ht 5' 7\" (1.702 m)   Wt 84.3 kg (185 lb 13.6 oz)   SpO2 94%   BMI 29.11 kg/m²     GENERAL: Lethargic, responds only to painful stimuli. HEENT:  NGT in place. NECK: Supple, trachea midline, no adenopathy, no thyromegally or tenderness, no carotid bruit and no JVD. LUNGS:   Vesicular breath sounds bilaterally, no added sounds. HEART:   S1 and S2 well heard,RRR,  no murmur, click, rub or gallop. ABDOMEB:   Soft, non-tender. Normoactive bowel sounds. No masses,  No organomegaly. EXTREMETIES: No edema. NEUROLOGY: Very lethargic, responsive to painful stimuli. Labs:     Recent Labs     04/10/21  0304 04/09/21  0433   WBC 15.1* 17.7*   HGB 12.3 13.9   HCT 38.5 44.0    277     Recent Labs     04/10/21  0304 04/09/21  0433 04/08/21  0413    140 136   K 3.6 3.3* 3.6   * 107 105   CO2 23 24 21   BUN 40* 32* 26*   CREA 1.16 1.16 1.14   * 223* 239*   CA 8.1* 8.9 8.7   MG 2.8* 2.8* 2.5*   PHOS 2.5* 2.8 2.1*     No results for input(s): ALT, AP, TBIL, TBILI, TP, ALB, GLOB, GGT, AML, LPSE in the last 72 hours. No lab exists for component: SGOT, GPT, AMYP, HLPSE  No results for input(s): INR, PTP, APTT, INREXT, INREXT in the last 72 hours. No results for input(s): FE, TIBC, PSAT, FERR in the last 72 hours. No results found for: FOL, RBCF   No results for input(s): PH, PCO2, PO2 in the last 72 hours. No results for input(s): CPK, CKNDX, TROIQ in the last 72 hours.     No lab exists for component: CPKMB  Lab Results Component Value Date/Time    Cholesterol, total 160 04/07/2021 02:25 AM    HDL Cholesterol 60 04/07/2021 02:25 AM    LDL, calculated 69 04/07/2021 02:25 AM    Triglyceride 155 (H) 04/07/2021 02:25 AM    CHOL/HDL Ratio 2.7 04/07/2021 02:25 AM     Lab Results   Component Value Date/Time    Glucose (POC) 281 (H) 04/10/2021 11:26 AM    Glucose (POC) 289 (H) 04/10/2021 06:20 AM    Glucose (POC) 299 (H) 04/10/2021 02:49 AM    Glucose (POC) 231 (H) 04/09/2021 09:10 PM    Glucose (POC) 240 (H) 04/09/2021 05:11 PM     Lab Results   Component Value Date/Time    Color YELLOW/STRAW 04/06/2021 08:29 PM    Appearance CLEAR 04/06/2021 08:29 PM    Specific gravity 1.021 04/06/2021 08:29 PM    pH (UA) 5.5 04/06/2021 08:29 PM    Protein TRACE (A) 04/06/2021 08:29 PM    Glucose Negative 04/06/2021 08:29 PM    Ketone 80 (A) 04/06/2021 08:29 PM    Bilirubin NEGATIVE  08/28/2018 10:46 PM    Urobilinogen 0.2 04/06/2021 08:29 PM    Nitrites Negative 04/06/2021 08:29 PM    Leukocyte Esterase Negative 04/06/2021 08:29 PM    Epithelial cells FEW 04/06/2021 08:29 PM    Bacteria Negative 04/06/2021 08:29 PM    WBC 0-4 04/06/2021 08:29 PM    RBC 0-5 04/06/2021 08:29 PM         CODE STATUS:   Full Code   X DNR    Partial    Comfort Care       Signed:    Natalie Ferreira MD  Date of Service:  4/10/2021

## 2021-04-10 NOTE — PROGRESS NOTES
Bedside shift change report given to Marcelino  (oncoming nurse) by Angelina/Nelly RN (offgoing nurse). Report included the following information SBAR, Kardex, ED Summary, Intake/Output, MAR, Cardiac Rhythm NSR/SA and Dual Neuro Assessment. Shift assessment completed. Patient nonverbal, unresponsive, eyes do not open to pain. PERRL, 3mm. RUE does not withdraw to pain, RLE withdraws to pain. LUE/LLE moves spontaneously, but does not withdraw from pain. Coarse breath sounds throughout. Cardene gtt at 10mg/h.

## 2021-04-11 NOTE — PROGRESS NOTES
Clinical Pharmacy Note: Metronidazole Dosing    Please note that the metronidazole dose for Vivian Gardner has been changed to 500 mg IV q12h per [facility]-approved protocol. Please contact the pharmacy with any questions.     Doug Del Angel, JANICED

## 2021-04-11 NOTE — PROGRESS NOTES
Problem: Hemorrhagic Stroke: Day 3  Goal: Activity/Safety  Outcome: Progressing Towards Goal  Goal: Consults, if ordered  Outcome: Progressing Towards Goal  Goal: Diagnostic Test/Procedures  Outcome: Progressing Towards Goal  Goal: Nutrition/Diet  Outcome: Progressing Towards Goal  Goal: Medications  Outcome: Progressing Towards Goal

## 2021-04-11 NOTE — PROGRESS NOTES
Problem: Hemorrhagic Stroke: Day 4  Goal: Activity/Safety  Outcome: Progressing Towards Goal  Goal: Consults, if ordered  Outcome: Progressing Towards Goal  Goal: Nutrition/Diet  Outcome: Progressing Towards Goal  Goal: Medications  Outcome: Progressing Towards Goal

## 2021-04-11 NOTE — ROUTINE PROCESS
Bedside shift change report given to Angelina/Nelly RN (oncoming nurse) by Danya Teran RN/Reina LUNA (offgoing nurse). Report included the following information SBAR, Kardex, ED Summary, Intake/Output, MAR, Cardiac Rhythm NSR/ST and Dual Neuro Assessment. I have reviewed and agree with the charting of Kory Moe, as her preceptor.

## 2021-04-11 NOTE — PROGRESS NOTES
Bedside shift change report given to [address] (oncoming nurse) by Mamie Prieto RN (offgoing nurse). Report included the following information SBAR, Kardex, Intake/Output, MAR, Recent Results, Cardiac Rhythm ST and Dual Neuro Assessment.

## 2021-04-11 NOTE — PROGRESS NOTES
Shift assessment completed. Eyes open to voice and spontaneously, patient nonverbal, groaning to noxious stimuli, PERRL, 3mm. L side moves spontaneously. Able to squeeze and let go of hand when asked. R side withdraws from pain. Approx. 1510- Patient had 11 beat run of VT on monitor, other VSS, patient in NAD.  MD paged, new orders placed by MD.

## 2021-04-11 NOTE — H&P
.  [address] [facility]  Hospitalist Group  Kimberli Zhao MD  Phone/pager: [phone]      Daily progress note   Transferred out of the ICU on 4/9/2021    Admission summary and hospital course. History and physical was obtained from reviewing his chart and examination of the patient. Patient is not verbal at this time. patient is a 31-year-old male past medical history of type 2 diabetes mellitus, hypertension, who was brought to the emergency department on April 6, 2021 with altered mental status. Baseline patient is well oriented in time place and person. Patient was seen normal on April 5. Patient was found to be somnolent and sleeping in his recliner at the a.m. of April 6. Patient received Narcan on the way to emergency without any changes in his mentation patient was taken to the [facility] ER, CT scan showed large intracranial parenchymal hemorrhage. Patient was transferred to [facility] on April 7. Patient was seen by neurosurgeons and neurologist.  Patient is not a surgical candidate as per neurosurgeons. At ICU, patient noted Cardene to control his blood pressure, and NG tube for dysphagia. Patient CODE STATUS was changed to DNR/DNI. Transferred out of the ICU on 4/9. Subjective complaints/interim history     Patient opens eye to name. Son at bedside. Per son and RN,patient followed commands earlier. He moves his left arm and leg,able to squeeze my hand with his eft hand. Patient having congested cough. 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 Acute metabolic encephalopathy due to the above  -Being managed conservatively. Seen by neurology neurosurgery. -BP management to keep SBP<140. BP in target mostly except occasional spikes in the 160-170 when stimulated. Discussed with RN to resume the Cardene and wean down slowly  -Seizure prophylaxis with Keppra  -Neurochecks.  -Seizure and aspiration precautions      Diabetes mellitus type 2, fairly controlled with hemoglobin A1c of 6.4 now with hyperglycemia. Home regimen includes glipizide SR and Metformin.  -Accu-Cheks along with Humalog sliding scale every 6  -Increase dose of Lantus today. Leukocytosis, low-grade fever on 4/9, aspiration suspected. Not septic or toxic. Low grade fever and leukocytosis persisted  -Continue ceftriaxone. Add flagyl(pt with pcn allergy)  -Sputum cx. CXR    Dysphagia  -Getting nutrition via NGT      CODE STATUS: DNR  DVT prophylaxis: SCD  Disposition: To be determined  Prognosis: Poor     Review of systems: Unobtainable, patient nonresponsive. PHYSICAL EXAMINATION:  Visit Vitals  /78   Pulse 74   Temp 98.3 °F (36.8 °C)   Resp 16   Ht 5' 7\" (1.702 m)   Wt 91.2 kg (201 lb 1 oz)   SpO2 95%   BMI 31.49 kg/m²     GENERAL: opens eyes,moves left side  HEENT:  NGT in place. NECK: Supple, trachea midline, no adenopathy, no thyromegally or tenderness, no carotid bruit and no JVD. LUNGS:   Vesicular breath sounds bilaterally, no added sounds. HEART:   S1 and S2 well heard,RRR,  no murmur, click, rub or gallop. ABDOMEB:   Soft, non-tender. Normoactive bowel sounds. No masses,  No organomegaly. EXTREMETIES: No edema. NEUROLOGY: more awake today,opens eyes. Moves left side. Rigth side is weakl. Labs:     Recent Labs     04/11/21  0300 04/10/21  0304   WBC 13.5* 15.1*   HGB 11.8* 12.3   HCT 37.3 38.5    235     Recent Labs     04/11/21  0300 04/10/21  0304 04/09/21  0433    140 140   K 3.6 3.6 3.3*   * 110* 107   CO2 24 23 24   BUN 42* 40* 32*   CREA 1.15 1.16 1.16   * 304* 223*   CA 8.0* 8.1* 8.9   MG 2.8* 2.8* 2.8*   PHOS 3.2 2.5* 2.8     No results for input(s): ALT, AP, TBIL, TBILI, TP, ALB, GLOB, GGT, AML, LPSE in the last 72 hours.     No lab exists for component: SGOT, GPT, AMYP, HLPSE  No results for input(s): INR, PTP, APTT, INREXT, INREXT in the last 72 hours. No results for input(s): FE, TIBC, PSAT, FERR in the last 72 hours. No results found for: FOL, RBCF   No results for input(s): PH, PCO2, PO2 in the last 72 hours. No results for input(s): CPK, CKNDX, TROIQ in the last 72 hours. No lab exists for component: CPKMB  Lab Results   Component Value Date/Time    Cholesterol, total 160 04/07/2021 02:25 AM    HDL Cholesterol 60 04/07/2021 02:25 AM    LDL, calculated 69 04/07/2021 02:25 AM    Triglyceride 155 (H) 04/07/2021 02:25 AM    CHOL/HDL Ratio 2.7 04/07/2021 02:25 AM     Lab Results   Component Value Date/Time    Glucose (POC) 228 (H) 04/11/2021 05:59 AM    Glucose (POC) 302 (H) 04/11/2021 02:33 AM    Glucose (POC) 243 (H) 04/10/2021 09:40 PM    Glucose (POC) 239 (H) 04/10/2021 05:56 PM    Glucose (POC) 219 (H) 04/10/2021 01:58 PM     Lab Results   Component Value Date/Time    Color YELLOW/STRAW 04/06/2021 08:29 PM    Appearance CLEAR 04/06/2021 08:29 PM    Specific gravity 1.021 04/06/2021 08:29 PM    pH (UA) 5.5 04/06/2021 08:29 PM    Protein TRACE (A) 04/06/2021 08:29 PM    Glucose Negative 04/06/2021 08:29 PM    Ketone 80 (A) 04/06/2021 08:29 PM    Bilirubin NEGATIVE  08/28/2018 10:46 PM    Urobilinogen 0.2 04/06/2021 08:29 PM    Nitrites Negative 04/06/2021 08:29 PM    Leukocyte Esterase Negative 04/06/2021 08:29 PM    Epithelial cells FEW 04/06/2021 08:29 PM    Bacteria Negative 04/06/2021 08:29 PM    WBC 0-4 04/06/2021 08:29 PM    RBC 0-5 04/06/2021 08:29 PM         CODE STATUS:   Full Code   X DNR    Partial    Comfort Care       Signed:    Ju Dewey MD  Date of Service:  4/11/2021

## 2021-04-11 NOTE — PROGRESS NOTES
Problem: Hemorrhagic Stroke: Admission Day (0-3 hours) Goal: Activity/Safety Outcome: Progressing Towards Goal 
Goal: Medications Outcome: Progressing Towards Goal 
  
Problem: Hemorrhagic Stroke:  3-24 hours Goal: Nutrition/Diet Outcome: Progressing Towards Goal 
Goal: Discharge Planning Outcome: Progressing Towards Goal

## 2021-04-12 NOTE — PROGRESS NOTES
Neurology Progress Note  Federica Kelly NP      Date of admission: 4/7/2021    Patient: Lito Bonilla MRN: [id number]  SSN: [id number]    YOB: 1940  Age: 80 y.o. Sex: male        Subjective:     HPI: Lito Bonilla is a 80 y.o. male with a past medical history of HTN who presented to ED [facility] ED on 4/6/21 via EMS after his family found him in his chair unresponsive. He was last seen normal the day before. CT head showed a found to have left frontal lobe IPH measuring 48 x 60 x 54 mm with SAH and IVH with 11 mm left to right midline shift. ICH score 4. Neurosurgery was consulted, no surgical intervention indicated at this time. No known APT or OAC. 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 MRI with similar findings. BP was 165/103 on arrival to ED [facility], but did get as high as 181/101 while in the ED. HgbA1C 6.4, LDL 69. Transferred to [address] [place] [address]. Interval 04/12/21:     Pt is quite alert. Appears to have good comprehension as he follows most commands and nods appropriately. Currently with no speech and will likely have significant expressive aphasia given the location of the hemorrhage. Review of systems  Review of systems negative except as detailed in the HPI, interval, PMH and A&P    History reviewed. No pertinent past medical history. History reviewed. No pertinent surgical history. History reviewed. No pertinent family history. Social History     Tobacco Use    Smoking status: Not on file   Substance Use Topics    Alcohol use: Not on file      Prior to Admission medications    Medication Sig Start Date End Date Taking? Authorizing Provider   lisinopriL (PRINIVIL, ZESTRIL) 20 mg tablet Take  by mouth two (2) times a day. Yes Provider, Historical   glipiZIDE SR (GLUCOTROL XL) 10 mg CR tablet Take 5 mg by mouth daily.    Yes Provider, Historical   hydroCHLOROthiazide (MICROZIDE) 12.5 mg capsule Take 12.5 mg by mouth daily. Yes Provider, Historical   metFORMIN (GLUCOPHAGE) 1,000 mg tablet Take 1,000 mg by mouth two (2) times daily (with meals). Yes Provider, Historical   ondansetron (ZOFRAN ODT) 4 mg disintegrating tablet Take 1 Tab by mouth every eight (8) hours as needed for Nausea.  8/28/18   Millbrook Laundry, DO     Current Facility-Administered Medications   Medication Dose Route Frequency Provider Last Rate Last Admin    insulin glargine (LANTUS) injection 25 Units  25 Units SubCUTAneous DAILY CHARLIE Byrd MD   25 Units at 04/12/21 0822    metroNIDAZOLE (FLAGYL) IVPB premix 500 mg  500 mg IntraVENous Q12H Michi Byrd  mL/hr at 04/12/21 0037 500 mg at 04/12/21 0037    potassium bicarb-citric acid (EFFER-K) tablet 40 mEq  40 mEq Per G Tube DAILY Michi Byrd MD   40 mEq at 04/12/21 0823    insulin lispro (HUMALOG) injection   SubCUTAneous Q6H BONY Byrd MD   3 Units at 04/12/21 0624    metoprolol tartrate (LOPRESSOR) tablet 25 mg  25 mg Oral Q12H Michi Byrd MD   25 mg at 04/12/21 0823    cefTRIAXone (ROCEPHIN) 2 g in 0.9% sodium chloride (MBP/ADV) 50 mL MBP  2 g IntraVENous Q24H Skylar Cedillo  mL/hr at 04/11/21 1158 2 g at 04/11/21 1158    hydrALAZINE (APRESOLINE) tablet 50 mg  50 mg Oral TID Skylar Cedillo MD   50 mg at 04/12/21 0824    niCARdipine (CARDENE) 25 mg in 0.9% sodium chloride 250 mL infusion  0-15 mg/hr IntraVENous TITRATE PETRA Beard 75 mL/hr at 04/12/21 0847 7.5 mg/hr at 04/12/21 0847    famotidine (PEPCID) tablet 20 mg  20 mg Per NG tube DAILY Gabbi Yeung NP   20 mg at 04/12/21 0823    levETIRAcetam (KEPPRA) oral solution 500 mg  500 mg Per NG tube Q12H Gabbi Yeung NP   500 mg at 04/12/21 0411    sodium chloride (NS) flush 5-40 mL  5-40 mL IntraVENous Q8H Sharion Pillow, ACNP   10 mL at 04/12/21 0413    sodium chloride (NS) flush 5-40 mL  5-40 mL IntraVENous PRN Sharion Pillow, ACNP        acetaminophen (TYLENOL) tablet 650 mg  650 mg Oral Q6H PRN Mehdi Correa, ACNP   650 mg at 21 0413    Or    acetaminophen (TYLENOL) suppository 650 mg  650 mg Rectal Q6H PRN Mehdi Correa, ACNP        promethazine (PHENERGAN) tablet 12.5 mg  12.5 mg Oral Q6H PRN Mehdi Correa, ACNP        Or    ondansetron [facility]) injection 4 mg  4 mg IntraVENous Q6H PRN Mehdi Correa, ACNP        senna-docusate (PERICOLACE) 8.6-50 mg per tablet 1 Tab  1 Tab Oral BID Mehdi Correa, ACNP   1 Tab at 21 4658    bisacodyL (DULCOLAX) suppository 10 mg  10 mg Rectal DAILY PRN Mehdi Correa, ACNP        morphine injection 2 mg  2 mg IntraVENous Q2H PRN Mehdi Correa, ACNP   2 mg at 21 1350    glucose chewable tablet 16 g  4 Tab Oral PRN Mehdi Correa, ACNP        dextrose (D50W) injection syrg 12.5-25 g  12.5-25 g IntraVENous PRN Mehdi Correa, ACNP        glucagon [facility]) injection 1 mg  1 mg IntraMUSCular PRN Mehdi Correa, ACNP        dextrose (D50W) injection syrg 12.5-25 g  12.5-25 g IntraVENous PRN Amada Corey NP        hydrALAZINE (APRESOLINE) 20 mg/mL injection 20 mg  20 mg IntraVENous Q6H PRN Amada Corey NP   20 mg at 21 0815    lisinopriL (PRINIVIL, ZESTRIL) tablet 20 mg  20 mg Oral DAILY Mehdi Correa, ACNP   20 mg at 21 2347        Allergies   Allergen Reactions    Pcn [Penicillins] Rash       Objective:     Vitals:    21 0600 21 0823 21 0847 21 0950   BP: (!) 141/78 128/76 (!) 142/78 115/78   Pulse: 89 87 87 81   Resp:    Temp: 98.7 °F (37.1 °C)   99.5 °F (37.5 °C)   SpO2: 93%   93%        Temp (24hrs), Av °F (37.2 °C), Min:98.1 °F (36.7 °C), Max:99.9 °F (37.7 °C)        O2 Device: Nasal cannula  O2 Flow Rate (L/min): 2 l/min      Intake/Output Summary (Last 24 hours) at 2021 1029  Last data filed at 2021 2222  Gross per 24 hour   Intake 465 ml   Output --   Net 465 ml       General: Appears a little anxious  Cardiac: RRR  Lungs: Unlabored breathing. Abdomen: Soft/NT/non-distended. Extremities. No edema. Neurologic Exam:  Mental Status:  Alert. Regards. Follows simple commands    Language:    Grossly intact comprehension. No speech    Cranial Nerves:   Pupils equal, round and reactive to light. Blinks to threat bilaterally     Mild L ptosis     Conjugate gaze       Tracks examiner well. Facial grossly symmetric, but not assessed with activation     Hearing grossly intact. Motor:    Bulk and tone normal.      RUE 1/5     LUE antigravity, strong     RLE 2-3/5     LLE appears intact     No involuntary movements. Sensation:    Denies sensation RUE. Can feel in RLE    Coordination & Gait: deferred    LABS:  Recent Labs     04/12/21  0120 04/11/21  0300 04/10/21  0304   WBC 12.8* 13.5* 15.1*   HGB 11.3* 11.8* 12.3   HCT 35.6* 37.3 38.5    217 235     Recent Labs     04/12/21  0120 04/11/21  0300 04/10/21  0304   * 142 140   K 3.8 3.6 3.6   * 113* 110*   CO2 25 24 23   BUN 35* 42* 40*   CREA 1.11 1.15 1.16   * 305* 304*   CA 8.0* 8.0* 8.1*   MG 2.6* 2.8* 2.8*   PHOS 2.0* 3.2 2.5*     No results for input(s): ALT, AP, TBIL, TBILI, TP, ALB, GLOB, GGT, AML, LPSE in the last 72 hours. No lab exists for component: SGOT, GPT, AMYP, HLPSE  No results for input(s): INR, PTP, APTT, INREXT in the last 72 hours. No results for input(s): PHI, PCO2I, PO2I, HCO3I in the last 72 hours. No results for input(s): CPK, CKNDX, TROIQ in the last 72 hours.     No lab exists for component: CPKMB  Lab Results   Component Value Date/Time    Cholesterol, total 160 04/07/2021 02:25 AM    HDL Cholesterol 60 04/07/2021 02:25 AM    LDL, calculated 69 04/07/2021 02:25 AM    Triglyceride 155 (H) 04/07/2021 02:25 AM    CHOL/HDL Ratio 2.7 04/07/2021 02:25 AM     Lab Results   Component Value Date/Time    Glucose (POC) 210 (H) 04/12/2021 06:02 AM    Glucose (POC) 223 (H) 04/11/2021 11:44 PM    Glucose (POC) 259 (H) 04/11/2021 06:50 PM    Glucose (POC) 219 (H) 04/11/2021 01:25 PM    Glucose (POC) 228 (H) 04/11/2021 05:59 AM     Lab Results   Component Value Date/Time    Color YELLOW/STRAW 04/06/2021 08:29 PM    Appearance CLEAR 04/06/2021 08:29 PM    Specific gravity 1.021 04/06/2021 08:29 PM    pH (UA) 5.5 04/06/2021 08:29 PM    Protein TRACE (A) 04/06/2021 08:29 PM    Glucose Negative 04/06/2021 08:29 PM    Ketone 80 (A) 04/06/2021 08:29 PM    Bilirubin NEGATIVE  08/28/2018 10:46 PM    Urobilinogen 0.2 04/06/2021 08:29 PM    Nitrites Negative 04/06/2021 08:29 PM    Leukocyte Esterase Negative 04/06/2021 08:29 PM    Epithelial cells FEW 04/06/2021 08:29 PM    Bacteria Negative 04/06/2021 08:29 PM    WBC 0-4 04/06/2021 08:29 PM    RBC 0-5 04/06/2021 08:29 PM       No results for input(s): FE, TIBC, PSAT, FERR in the last 72 hours. No results found for: FOL, RBCF   No results for input(s): PH, PCO2, PO2 in the last 72 hours. No results for input(s): CPK, CKNDX, TROIQ in the last 72 hours. No lab exists for component: CPKMB    Imaging:  Xr Abd (kub)    Result Date: 4/12/2021  Appropriate Dobbhoff tube placement. Xr Chest Port    Result Date: 4/12/2021  Mild atelectasis left lower lobe. CT Results:  Results from Hospital Encounter encounter on 04/07/21   CT HEAD WO CONT    Narrative HEAD CT WITHOUT CONTRAST: 4/8/2021 1:35 AM    INDICATION: assess hemorrhage    COMPARISON: 4/7/2021, 4/6/2021. MRI brain 4/7/2021. PROCEDURE: Axial images of the head were obtained without contrast. Coronal and  sagittal reformats were performed. CT dose reduction was achieved through use of  a standardized protocol tailored for this examination and automatic exposure  control for dose modulation. FINDINGS: Left frontal parenchymal hemorrhage is minimally decreased. It  measures 45 x 57 x 51 mm, versus 48 x 60 x 54 mm on 4/6/2021.  Edema and  petechial hemorrhages surrounded in the left frontal lobe. There is effacement  of the local sulci and stable, 12 mm, left to right midline shift. Hemorrhage  extends through the wall of the left lateral ventricle, and subarachnoid  hemorrhages layers dependently in the left greater than right lateral  ventricles. This is also stable. Dilation of the left lateral ventricle,  particularly the temporal horn, is stable. A tiny subarachnoid hemorrhage in the posterior right sylvian fissure is more  faint and subtle today (401-58). Trace hyperdensity in a chronic, right inferior cerebellar infarction (2-8)  could represent hemorrhage. However, there was no magnetic susceptibility  artifact within this on yesterday's MRI. Trace subarachnoid hemorrhage extends  through the foramina of [place]. Impression 1. Stable left frontal parenchymal hemorrhage. 2. Stable mass effect. CTA NECK    Narrative EXAM: CTA HEAD, CTA NECK    INDICATION: assess for vascular source of hemorrhage    COMPARISON: CT of the head dated April 7, 2021 and April 6, 2021. CONTRAST: 100 mL of Isovue-370. TECHNIQUE:  Unenhanced  images were obtained to localize the volume for  acquisition. Multislice helical axial CT angiography was performed from the  aortic arch to the top of the head during uneventful rapid bolus intravenous  contrast administration. Coronal and sagittal reformations and 3D post  processing was performed. CT dose reduction was achieved through use of a  standardized protocol tailored for this examination and automatic exposure  control for dose modulation. FINDINGS:    DELAYED ENHANCEMENT HEAD CT  4.8 x 5.8 cm parenchymal hemorrhage centered on the left and frontal lobe and  left basal ganglia is unchanged from yesterday's exam. Prominent surrounding  edema is redemonstrated. Blood layering in the posterior horns of the bilateral  ventricle is unchanged. 11 mm rightward shift of the septum pellucidum is  unchanged.  Previous identified small area of right-sided subarachnoid hemorrhage  is not visualized. The left posterior horn is slightly enlarged, but unchanged from yesterday's  exam. The left frontal horn is effaced. Remote right cerebellar infarct redemonstrated. Dural venous sinuses are patent. No abnormal parenchymal or meningeal  enhancement. Mastoid air cells and paranasal sinuses are clear. CTA NECK  There is conventional three vessel arch anatomy. The bilateral subclavian,  common carotid, and internal carotid arteries are patent with no flow-limiting  stenosis. % of right carotid artery stenosis: 0  % of left carotid artery stenosis: 0  Measurements utilize NASCET criteria. NASCET method was utilized for calculating stenosis. Left vertebral artery is widely patent, the right is patent but somewhat  diminutive. The cervical soft tissues are unremarkable. There are degenerative  changes of the cervical spine. CTA HEAD  Moderate atherosclerotic disease of the bilateral intracranial internal carotid  arteries, without hemodynamically significant stenosis. Focal stenosis of the left A2 segment (602-52), nonocclusive. The anterior communicating artery is patent. The left vertebral artery is patent, with focal moderate atherosclerotic  disease, no hemodynamically significant stenosis. The right vertebral artery is  patent but diminutive. The right PICA is occluded, likely chronic given the  presence of remote infarct. The basilar artery is patent. The bilateral  posterior communicating arteries are diminutive. The bilateral P1 segments are  widely patent. The posterior cerebral arteries are patent. There is a tiny but prominent perforating branch within the area of the left  parenchymal hemorrhage, without aneurysm, active extravasation, or vascular  malformation. Impression 1. Stable left frontal/basal ganglia parenchymal hemorrhage, with surrounding  edema.  Associated mass effect, including 11 mm rightward midline shift and  enlargement of the left posterior horn, is unchanged. Bilateral intraventricular  hemorrhage is also unchanged. 2. No aneurysm or any other vascular abnormality. No active extravasation. 3. Chronically occluded right PICA, with associated chronic right cerebellar  infarct. 4. Focal stenosis of the left A2 segment. CTA HEAD    Narrative EXAM: CTA HEAD, CTA NECK    INDICATION: assess for vascular source of hemorrhage    COMPARISON: CT of the head dated April 7, 2021 and April 6, 2021. CONTRAST: 100 mL of Isovue-370. TECHNIQUE:  Unenhanced  images were obtained to localize the volume for  acquisition. Multislice helical axial CT angiography was performed from the  aortic arch to the top of the head during uneventful rapid bolus intravenous  contrast administration. Coronal and sagittal reformations and 3D post  processing was performed. CT dose reduction was achieved through use of a  standardized protocol tailored for this examination and automatic exposure  control for dose modulation. FINDINGS:    DELAYED ENHANCEMENT HEAD CT  4.8 x 5.8 cm parenchymal hemorrhage centered on the left and frontal lobe and  left basal ganglia is unchanged from yesterday's exam. Prominent surrounding  edema is redemonstrated. Blood layering in the posterior horns of the bilateral  ventricle is unchanged. 11 mm rightward shift of the septum pellucidum is  unchanged. Previous identified small area of right-sided subarachnoid hemorrhage  is not visualized. The left posterior horn is slightly enlarged, but unchanged from yesterday's  exam. The left frontal horn is effaced. Remote right cerebellar infarct redemonstrated. Dural venous sinuses are patent. No abnormal parenchymal or meningeal  enhancement. Mastoid air cells and paranasal sinuses are clear. CTA NECK  There is conventional three vessel arch anatomy.  The bilateral subclavian,  common carotid, and internal carotid arteries are patent with no flow-limiting  stenosis. % of right carotid artery stenosis: 0  % of left carotid artery stenosis: 0  Measurements utilize NASCET criteria. NASCET method was utilized for calculating stenosis. Left vertebral artery is widely patent, the right is patent but somewhat  diminutive. The cervical soft tissues are unremarkable. There are degenerative  changes of the cervical spine. CTA HEAD  Moderate atherosclerotic disease of the bilateral intracranial internal carotid  arteries, without hemodynamically significant stenosis. Focal stenosis of the left A2 segment (602-52), nonocclusive. The anterior communicating artery is patent. The left vertebral artery is patent, with focal moderate atherosclerotic  disease, no hemodynamically significant stenosis. The right vertebral artery is  patent but diminutive. The right PICA is occluded, likely chronic given the  presence of remote infarct. The basilar artery is patent. The bilateral  posterior communicating arteries are diminutive. The bilateral P1 segments are  widely patent. The posterior cerebral arteries are patent. There is a tiny but prominent perforating branch within the area of the left  parenchymal hemorrhage, without aneurysm, active extravasation, or vascular  malformation. Impression 1. Stable left frontal/basal ganglia parenchymal hemorrhage, with surrounding  edema. Associated mass effect, including 11 mm rightward midline shift and  enlargement of the left posterior horn, is unchanged. Bilateral intraventricular  hemorrhage is also unchanged. 2. No aneurysm or any other vascular abnormality. No active extravasation. 3. Chronically occluded right PICA, with associated chronic right cerebellar  infarct. 4. Focal stenosis of the left A2 segment.        MRI Results:  Results from [place] encounter on 04/07/21   MRI BRAIN WO CONT    Narrative EXAM: MRI BRAIN WO CONT    INDICATION: assess ich    COMPARISON: CT head 4/7/2021. CONTRAST: None. TECHNIQUE:    Multiplanar multisequence acquisition without contrast of the brain. FINDINGS:  Evaluation is significantly limited by motion. No significant change in size of left basal ganglia and frontal lobe  intraparenchymal hemorrhage measuring 6.9 x 5.0 x 5.5 cm, with fluid fluid  levels noted. There is unchanged surrounding edema within the left frontal lobe,  basal ganglia and insula, with mass effect resulting in 14 mm of rightward  midline shift. Basilar cisterns remain patent. There is stable intraventricular hemorrhage within the occipital horns, third  ventricle and body of the left lateral ventricle. Stable size of the ventricles. Few punctate foci of acute ischemic infarct in the parasagittal left  frontoparietal lobe (series 5 image 50), and in the left lateral frontal lobe  (series 5 images 51 and 52). Generalized parenchymal volume loss with commensurate dilation of the sulci and  ventricular system. Scattered periventricular and deep white matter T2/FLAIR  hyperintensities, consistent with mild chronic microvascular ischemic disease,  with multiple small chronic white matter infarcts within the corona radiata and  centrum semiovale. Chronic infarct in the right inferior cerebellum. There is no  cerebellar tonsillar herniation. Expected arterial flow-voids are present. Mild mucosal thickening the bilateral ethmoidal air cells and left sphenoid  sinus. The mastoid air cells and middle ears are clear. The orbital contents are  within normal limits. No significant osseous or scalp lesions are identified. Impression Evaluation is significantly limited by motion. 1. Stable size of left basal ganglia and frontal lobe intraparenchymal  hemorrhage, with stable surrounding edema and mass effect resulting in 14 mm of  rightward midline shift. 2. Stable intraventricular hemorrhage. Stable ventricular size.   3. Few punctate foci of acute infarction in the parasagittal left frontoparietal  lobe and left lateral frontal lobe. 4. Generalized parenchymal volume loss and mild chronic microvascular ischemic  disease with multiple small chronic white matter infarcts. Chronic infarct in  the right inferior cerebellum. XR Results   Results from [place] encounter on 04/07/21   XR ABD (KUB)    Impression Appropriate Dobbhoff tube placement. XR CHEST PORT    Impression Mild atelectasis left lower lobe. XR CHEST PORT    Impression 1. Improved aeration with persistent bibasilar areas of linear atelectasis right  greater than left. This could be due to mucous plugging. Aspiration can cause  mucous plugging. VAS/US Results (maximum last 3): No results found for this or any previous visit. TTE  04/07/21   ECHO ADULT COMPLETE 04/08/2021 4/8/2021    Narrative · Image quality for this study was technically difficult. · LV: Estimated LVEF is 60 - 65%. Normal cavity size and systolic function   (ejection fraction normal). Moderate concentric hypertrophy. Wall motion:   unable to assess due to limited image quality. · IAS: No atrial septal defect present. Signed by: Tamar Leo MD         EKG  Results for orders placed or performed during the hospital encounter of 04/06/21   EKG, 12 LEAD, INITIAL   Result Value Ref Range    Ventricular Rate 104 BPM    Atrial Rate 104 BPM    P-R Interval 168 ms    QRS Duration 78 ms    Q-T Interval 348 ms    QTC Calculation (Bezet) 457 ms    Calculated P Axis 30 degrees    Calculated R Axis -19 degrees    Calculated T Axis 63 degrees    Diagnosis       Sinus tachycardia  Minimal voltage criteria for LVH, may be normal variant  When compared with ECG of 28-AUG-2018 21:01,  premature atrial complexes are no longer present  Vent.  rate has increased BY  36 BPM  Confirmed by Jammie Salinas, P.V. ([id number]) on 4/6/2021 11:34:39 PM         Hospital Problems  Never Reviewed Codes Class Noted POA    Basal ganglia hemorrhage ([facility] [address] ICD-10-CM: I61.0  ICD-9-CM: 158  4/7/2021 Unknown              Assessment/Plan:     Natalya Pruitt is a 80 y.o. male found minimally responsive on 4/6/21 with evidence of left frontal lobe ICH, SAH/IVH likely hypertensive. No vascular malformation noted on CTA H/N. Pt is quite alert today 4/12. Appears to have good comprehension as he follows most commands and nods appropriately. Currently with no speech and will likely have significant expressive aphasia given the location of the hemorrhage. Dense RUE plegia and weak RLE. Can go to Q4 NCs  SBP <160 per [facility]'s ICH guidelines  Keppra for seizure prophylaxis. GOC discussion  Poor prognosis.         Thank you for this consult.     Signed By: Lexie Pruitt NP     April 12, 2021 10:29 AM

## 2021-04-12 NOTE — PROGRESS NOTES
.  [address] [facility] Adult  Hospitalist Group  Jazmyn Grullon MD  Phone/pager: [phone]      Daily progress note   Transferred out of the ICU on 4/9/2021    Admission summary and hospital course. History and physical was obtained from reviewing his chart and examination of the patient. Patient is not verbal at this time. patient is a 70-year-old male past medical history of type 2 diabetes mellitus, hypertension, who was brought to the emergency department on April 6, 2021 with altered mental status. Baseline patient is well oriented in time place and person. Patient was seen normal on April 5. Patient was found to be somnolent and sleeping in his recliner at the a.m. of April 6. Patient received Narcan on the way to emergency without any changes in his mentation patient was taken to the [facility] ER, CT scan showed large intracranial parenchymal hemorrhage. Patient was transferred to [facility] on April 7. Patient was seen by neurosurgeons and neurologist.  Patient is not a surgical candidate as per neurosurgeons. At ICU, patient noted Cardene to control his blood pressure, and NG tube for dysphagia. Patient CODE STATUS was changed to DNR/DNI. Transferred out of the ICU on 4/9. Subjective complaints/interim history     Patient awake,followed some commands. Moves left side. Right side is weak. No family in the room. 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 Acute metabolic encephalopathy due to the above  -Being managed conservatively. Seen by neurology neurosurgery. -BP management to keep SBP<140. BP in target mostly except occasional spikes in the 160-170 when stimulated. Discussed with RN to resume the Cardene and wean down slowly  -Seizure prophylaxis with Keppra  -Neurochecks.  -Seizure and aspiration precautions      Diabetes mellitus type 2, fairly controlled with hemoglobin A1c of 6.4 now with hyperglycemia. Home regimen includes glipizide SR and Metformin.  -Accu-Cheks along with Humalog sliding scale every 6  -Increase dose of Lantus today. Leukocytosis, low-grade fever on 4/9, aspiration suspected. Not septic or toxic. Low grade fever and leukocytosis persisted  -Continue ceftriaxone. Add flagyl(pt with pcn allergy)  -Sputum cx from 4/8 grew mssa. Fever curve and wbc coming down on current regimen,continue. Discussed with pharmacy. Dysphagia  -Getting nutrition via NGT    Palliative medicine consult to assist with [place] [address] discussion       CODE STATUS: DNR  DVT prophylaxis: SCD  Disposition: To be determined  Prognosis: Poor     Review of systems: Unobtainable, patient nonresponsive. PHYSICAL EXAMINATION:  Visit Vitals  /78 (BP 1 Location: Left arm, BP Patient Position: At rest)   Pulse 81   Temp 99.5 °F (37.5 °C)   Resp 22   Ht 5' 7\" (1.702 m)   Wt 92 kg (202 lb 13.2 oz)   SpO2 93%   BMI 31.77 kg/m²     GENERAL: opens eyes,moves left side  HEENT:  NGT in place. NECK: Supple, trachea midline, no adenopathy, no thyromegally or tenderness, no carotid bruit and no JVD. LUNGS:   Vesicular breath sounds bilaterally, no added sounds. HEART:   S1 and S2 well heard,RRR,  no murmur, click, rub or gallop. ABDOMEB:   Soft, non-tender. Normoactive bowel sounds. No masses,  No organomegaly. EXTREMETIES: No edema. NEUROLOGY: more awake today,opens eyes. Moves left side. Follows some commands. Rigth side is weakl.       Labs:     Recent Labs     04/12/21  0120 04/11/21  0300   WBC 12.8* 13.5*   HGB 11.3* 11.8*   HCT 35.6* 37.3    217     Recent Labs     04/12/21  0120 04/11/21  0300 04/10/21  0304   * 142 140   K 3.8 3.6 3.6   * 113* 110*   CO2 25 24 23   BUN 35* 42* 40*   CREA 1.11 1.15 1.16   * 305* 304*   CA 8.0* 8.0* 8.1*   MG 2.6* 2.8* 2.8*   PHOS 2.0* 3.2 2.5*     No results for input(s): ALT, AP, TBIL, TBILI, TP, ALB, GLOB, GGT, AML, LPSE in the last 72 hours. No lab exists for component: SGOT, GPT, AMYP, HLPSE  No results for input(s): INR, PTP, APTT, INREXT, INREXT in the last 72 hours. No results for input(s): FE, TIBC, PSAT, FERR in the last 72 hours. No results found for: FOL, RBCF   No results for input(s): PH, PCO2, PO2 in the last 72 hours. No results for input(s): CPK, CKNDX, TROIQ in the last 72 hours. No lab exists for component: CPKMB  Lab Results   Component Value Date/Time    Cholesterol, total 160 04/07/2021 02:25 AM    HDL Cholesterol 60 04/07/2021 02:25 AM    LDL, calculated 69 04/07/2021 02:25 AM    Triglyceride 155 (H) 04/07/2021 02:25 AM    CHOL/HDL Ratio 2.7 04/07/2021 02:25 AM     Lab Results   Component Value Date/Time    Glucose (POC) 210 (H) 04/12/2021 06:02 AM    Glucose (POC) 223 (H) 04/11/2021 11:44 PM    Glucose (POC) 259 (H) 04/11/2021 06:50 PM    Glucose (POC) 219 (H) 04/11/2021 01:25 PM    Glucose (POC) 228 (H) 04/11/2021 05:59 AM     Lab Results   Component Value Date/Time    Color YELLOW/STRAW 04/06/2021 08:29 PM    Appearance CLEAR 04/06/2021 08:29 PM    Specific gravity 1.021 04/06/2021 08:29 PM    pH (UA) 5.5 04/06/2021 08:29 PM    Protein TRACE (A) 04/06/2021 08:29 PM    Glucose Negative 04/06/2021 08:29 PM    Ketone 80 (A) 04/06/2021 08:29 PM    Bilirubin NEGATIVE  08/28/2018 10:46 PM    Urobilinogen 0.2 04/06/2021 08:29 PM    Nitrites Negative 04/06/2021 08:29 PM    Leukocyte Esterase Negative 04/06/2021 08:29 PM    Epithelial cells FEW 04/06/2021 08:29 PM    Bacteria Negative 04/06/2021 08:29 PM    WBC 0-4 04/06/2021 08:29 PM    RBC 0-5 04/06/2021 08:29 PM         CODE STATUS:   Full Code   X DNR    Partial    Comfort Care       Signed:    Eduardo Celis MD  Date of Service:  4/12/2021

## 2021-04-12 NOTE — PROGRESS NOTES
1930: Bedside and Verbal shift change report given to Tiara RN (oncoming nurse) by Shailesh Chase RN (offgoing nurse). Report included the following information SBAR, Kardex, Intake/Output, MAR, Recent Results, Med Rec Status, Cardiac Rhythm SR/ST and Dual Neuro Assessment.        Problem: Hemorrhagic Stroke: Day 5 through Discharge  Goal: Activity/Safety  Outcome: Progressing Towards Goal  Goal: Consults, if ordered  Outcome: Progressing Towards Goal  Goal: Diagnostic Test/Procedures  Outcome: Progressing Towards Goal  Goal: Nutrition/Diet  Outcome: Progressing Towards Goal  Goal: Medications  Outcome: Progressing Towards Goal  Goal: Respiratory  Outcome: Progressing Towards Goal  Goal: Treatments/Interventions/Procedures  Outcome: Progressing Towards Goal  Goal: Psychosocial  Outcome: Progressing Towards Goal  Goal: *Hemodynamically stable  Outcome: Progressing Towards Goal  Goal: *Verbalizes anxiety and depression are reduced or absent  Outcome: Progressing Towards Goal  Goal: *Absence of aspiration  Outcome: Progressing Towards Goal  Goal: *Absence of signs and symptoms of DVT  Outcome: Progressing Towards Goal  Goal: *Optimal pain control at patient's stated goal  Outcome: Progressing Towards Goal  Goal: *Tolerating diet  Outcome: Progressing Towards Goal  Goal: *Progressive mobility and function  Outcome: Progressing Towards Goal  Goal: *Rehabilitation readiness  Outcome: Progressing Towards Goal

## 2021-04-12 NOTE — ROUTINE PROCESS
Occupational Therapy  -   04.12.2021     Chart reviewed, discussed with RN, attempted to see pt for OT eval.  Pt with eyes open and able to visually attend to therapist.  Observed pt moving LUE and LLE spontaneously but never on command. Pt demonstrated R hemiparesis and R neglect. Pt did not follow any commands despite max multimodal cuing. He remains unable to actively participate with therapy and has not been appropriate for therapy services for 6 consecutive days now, will sign off. Please re-consult when pt is following basic commands and is able to actively participate with therapy. Thank you. Pelon Khalil MS, OTR/L  Time spent with patient: 7 minutes

## 2021-04-12 NOTE — PROGRESS NOTES
Problem: Dysphagia (Adult)  Goal: *Acute Goals and Plan of Care (Insert Text)  Description: Speech pathology goals  Initiated 4/12/2021  1. Patient will participate in re-evaluation of swallow function within 7 days  Outcome: Not Progressing Towards Goal     SPEECH LANGUAGE PATHOLOGY BEDSIDE SWALLOW EVALUATION  Patient: Adri Randall (17 y.o. male)  Date: 4/12/2021  Primary Diagnosis: Basal ganglia hemorrhage (HCC) [I61.0]        Precautions: swallow       ASSESSMENT :  Based on the objective data described below, the patient presents with severe oral dysphagia despite being awake and following some 1-step commands. No verbalizations. Offered ice chip and applesauce on spoon and patient with absent bolus acceptance despite max verbal/tactile cues. Wife present and was encouraged by patient opening his eyes and following 1 command. Provided education that patient would not be appropriate for additional evaluation of swallowing until he actively closes his lips around the spoon, as there is high risk for aspiration given severity of oral dysphagia. Wife verbalized understanding. Patient will benefit from skilled intervention to address the above impairments. Patients rehabilitation potential is considered to be Guarded     PLAN :  Recommendations and Planned Interventions:  -NPO  -Agree with palliative for goals of care as patient is unsafe for PO intake  -SLP to follow for re-evaluation of swallow function if alertness/mental status improve  Frequency/Duration: Patient will be followed by speech-language pathology 1 time a week to address goals. Discharge Recommendations: To Be Determined     SUBJECTIVE:   Patient groaned but no verbalizations. OBJECTIVE:   History reviewed. No pertinent past medical history. History reviewed. No pertinent surgical history.   Prior Level of Function/Home Situation:      Diet prior to admission: unknown  Current Diet:  NPO   Cognitive and Communication Status:  Neurologic State: Alert  Orientation Level: Unable to verbalize  Cognition: Decreased attention/concentration, Decreased command following           Oral Assessment:  Oral Assessment  Oral Hygiene: dry oral mucosa  P.O. Trials:  Patient Position: upright in bed  Vocal quality prior to P.O.: Other (comment)(no verbalizations, minimal vocalizations)  Consistency Presented: Ice chips;Puree  How Presented: SLP-fed/presented;Spoon     Bolus Acceptance: Absent           Oral Phase Severity: Severe  Pharyngeal Phase Severity : Other (comment)(unable to assess due to oral dysphagia)    NOMS:   The NOMS functional outcome measure was used to quantify this patient's level of swallowing impairment. Based on the NOMS, the patient was determined to be at level 1 for swallow function       NOMS Swallowing Levels:  Level 1 (CN): NPO  Level 2 (CM): NPO but takes consistency in therapy  Level 3 (CL): Takes less than 50% of nutrition p.o. and continues with nonoral feedings; and/or safe with mod cues; and/or max diet restriction  Level 4 (CK): Safe swallow but needs mod cues; and/or mod diet restriction; and/or still requires some nonoral feeding/supplements  Level 5 (CJ): Safe swallow with min diet restriction; and/or needs min cues  Level 6 (CI): Independent with p.o.; rare cues; usually self cues; may need to avoid some foods or needs extra time  Level 7 ([address] [place] [address]): Independent for all p.o.  MARY LOU. (2003). National Outcomes Measurement System (NOMS): Adult Speech-Language Pathology User's Guide. Pain:  Pain Scale 1: Adult Nonverbal Pain Scale  Pain Intensity 1: 0       After treatment:   Patient left in no apparent distress in bed, Call bell within reach, Nursing notified, and Caregiver / family present    COMMUNICATION/EDUCATION:   Patient was educated regarding his deficit(s) of dysphagia as this relates to his diagnosis of CVA. He demonstrated Guarded understanding as evidenced by no response. Wife verbalized understanding.     The patient's plan of care including recommendations, planned interventions, and recommended diet changes were discussed with: Registered nurse. Patient/family have participated as able in goal setting and plan of care. Patient/family agree to work toward stated goals and plan of care.     Thank you for this referral.  SAMUEL Holder  Time Calculation: 13 mins

## 2021-04-12 NOTE — PROGRESS NOTES
Transition of Care Plan   RUR- 18% Low Risk   DISPOSITION: The disposition plan pending recommendation and medical progression.  Transport: [facility]/family     CM attempted Tx Team rounds this morning and it was reported that [address] is following patient. CM attempted to contact patients wife to introduce herself, however patients spouse was unavailable at this time. CM left a voice message. CM will continue to provide support, assist with LISA needs as they arise.     Janine Aguero MSARMANDO,CRM

## 2021-04-12 NOTE — CONSULTS
Palliative Medicine Consult  Ron: [phone] ([phone])    Patient Name: Lito Bonilla  YOB: 1940    Date of Initial Consult: April 12, 2021  Reason for Consult: Care Decisions  Requesting Provider: Dr. Parrish Knowles  Primary Care Physician: Rodrigo Pena MD     SUMMARY:   Lito Bonilla is a 80 y.o. male admitted on 4/7/2021 as a transfer from [facility] for neurosurgical services with a diagnosis of AMS due to large left basal ganglia and frontal lobe intracranial parenchymal hemorrhage. No surgical interventions offered. Pt with resultant dysphagia, dysarthria, and right sided weakness with high risk of aspiration. .  Recent chart notes indicate that the pt is following some simple commands and moving left side. PMH:  DM2, HTN,   Current medical issues leading to Palliative Medicine involvement include: we have been asked to support with care decisions given residual effects of stroke. 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 PALLIATIVE DIAGNOSES:   1. Dysarthria  2. Feeding difficulties  3. High risk for aspiration  4. Right sided weakness  5. Goals of care     PLAN:   1. Pt seen with wife at the bedside. Services introduced  2. Wife has a clear understanding of the patient's condition   3. Family is coping with this unfortunate event  4. Wife has a clear understanding of the patient's wishes and his benchmark for quality of life  5. We discussed peg placement. Wife feels if this is the best that he is going to be, then she feels it would be against his wishes to place a peg. Their son eluded to placing the peg to give the pt more time but he will go along with whatever the wife decides  6. Hard making some of these decisions as the pt is now a little bit more responsive and following some commands at times  7.  Discussed with attending and neurology team.  Neuro will meet with wife today to answer her questions  8. Informed wife that I would follow up with her tomorrow  5. Continue current care  10. Initial consult note routed to primary continuity provider and/or primary health care team members  6. Communicated plan of care with: Palliative IDTEtienne 192 Team     GOALS OF CARE / TREATMENT PREFERENCES:     GOALS OF CARE:  Patient/Health Care Proxy Stated Goals: Other (comment)(family still deciding)    TREATMENT PREFERENCES:   Code Status: DNR    Advance Care Planning:  [x] [facility] Interdisciplinary Team has updated the ACP Navigator with Health Care Decision Maker and Patient Capacity      Primary Decision Maker: Caren Gardner - Spouse - [phone]  No flowsheet data found. Medical Interventions: Limited additional interventions     Other Instructions: Other:    As far as possible, the palliative care team has discussed with patient / health care proxy about goals of care / treatment preferences for patient.      HISTORY:     History obtained from: chart    CHIEF COMPLAINT: pt admitted with aforementioned history and issues    HPI/SUBJECTIVE:    The patient is:   [] Verbal and participatory  [x] Non-participatory due to:   Medical condition      Clinical Pain Assessment (nonverbal scale for severity on nonverbal patients):   Clinical Pain Assessment  Severity: 0     Activity (Movement): Lying quietly, normal position    Duration: for how long has pt been experiencing pain (e.g., 2 days, 1 month, years)  Frequency: how often pain is an issue (e.g., several times per day, once every few days, constant)     FUNCTIONAL ASSESSMENT:     Palliative Performance Scale (PPS):  PPS: 30       PSYCHOSOCIAL/SPIRITUAL SCREENING:     Palliative IDT has assessed this patient for cultural preferences / practices and a referral made as appropriate to needs (Cultural Services, Patient Advocacy, Ethics, etc.)    Any spiritual / Advent concerns:  [] Yes /  [x] No    Caregiver Burnout:  [] Yes /  [x] No /  [] No Caregiver Present      Anticipatory grief assessment:   [x] Normal  / [] Maladaptive       ESAS Anxiety: Anxiety: 0    ESAS Depression:          REVIEW OF SYSTEMS:     Positive and pertinent negative findings in ROS are noted above in HPI. The following systems were [] reviewed / [x] unable to be reviewed as noted in HPI  Other findings are noted below. Systems: constitutional, ears/nose/mouth/throat, respiratory, gastrointestinal, genitourinary, musculoskeletal, integumentary, neurologic, psychiatric, endocrine. Positive findings noted below. Modified ESAS Completed by: provider   Fatigue: 4 Drowsiness: 0     Pain: 0   Anxiety: 0     Anorexia: 7 Dyspnea: 0                    PHYSICAL EXAM:     From RN flowsheet:  Wt Readings from Last 3 Encounters:   04/12/21 202 lb 13.2 oz (92 kg)   04/07/21 169 lb 15.6 oz (77.1 kg)   04/06/21 190 lb 0.6 oz (86.2 kg)     Blood pressure (!) 157/82, pulse 88, temperature 98.5 °F (36.9 °C), resp. rate 18, height 5' 7\" (1.702 m), weight 202 lb 13.2 oz (92 kg), SpO2 94 %. Pain Scale 1: Adult Nonverbal Pain Scale  Pain Intensity 1: 0              Pain Intervention(s) 1: Medication (see MAR)  Last bowel movement, if known:     Constitutional: ill appearing, alert at times, non sensical speech  Eyes: anicteric  ENMT: no nasal discharge, moist mucous membranes  Cardiovascular: regular rhythm, distal pulses intact  Respiratory: breathing not labored, symmetric  Gastrointestinal:  Musculoskeletal: no deformity, no tenderness to palpation  Skin: warm, dry  Neurologic: following commands intermittently, rt sided weakness, moving left extremities  Psychiatric:   Other:       HISTORY:     Active Problems:    Basal ganglia hemorrhage ([facility] [address] (4/7/2021)      History reviewed. No pertinent past medical history. History reviewed. No pertinent surgical history. History reviewed. No pertinent family history.    History reviewed, no pertinent family history.   Social History     Tobacco Use    Smoking status: Not on file   Substance Use Topics    Alcohol use: Not on file     Allergies   Allergen Reactions    Pcn [Penicillins] Rash      Current Facility-Administered Medications   Medication Dose Route Frequency    niCARdipine (CARDENE) 25 mg in 0.9% sodium chloride 250 mL infusion  0-15 mg/hr IntraVENous TITRATE    hydrALAZINE (APRESOLINE) 20 mg/mL injection 10 mg  10 mg IntraVENous Q1H PRN    labetaloL (NORMODYNE;TRANDATE) injection 10 mg  10 mg IntraVENous Q30MIN PRN    insulin glargine (LANTUS) injection 25 Units  25 Units SubCUTAneous DAILY    metroNIDAZOLE (FLAGYL) IVPB premix 500 mg  500 mg IntraVENous Q12H    potassium bicarb-citric acid (EFFER-K) tablet 40 mEq  40 mEq Per G Tube DAILY    insulin lispro (HUMALOG) injection   SubCUTAneous Q6H    metoprolol tartrate (LOPRESSOR) tablet 25 mg  25 mg Oral Q12H    cefTRIAXone (ROCEPHIN) 2 g in 0.9% sodium chloride (MBP/ADV) 50 mL MBP  2 g IntraVENous Q24H    hydrALAZINE (APRESOLINE) tablet 50 mg  50 mg Oral TID    famotidine (PEPCID) tablet 20 mg  20 mg Per NG tube DAILY    levETIRAcetam (KEPPRA) oral solution 500 mg  500 mg Per NG tube Q12H    sodium chloride (NS) flush 5-40 mL  5-40 mL IntraVENous Q8H    sodium chloride (NS) flush 5-40 mL  5-40 mL IntraVENous PRN    acetaminophen (TYLENOL) tablet 650 mg  650 mg Oral Q6H PRN    Or    acetaminophen (TYLENOL) suppository 650 mg  650 mg Rectal Q6H PRN    promethazine (PHENERGAN) tablet 12.5 mg  12.5 mg Oral Q6H PRN    Or    ondansetron (ZOFRAN) injection 4 mg  4 mg IntraVENous Q6H PRN    senna-docusate (PERICOLACE) 8.6-50 mg per tablet 1 Tab  1 Tab Oral BID    bisacodyL (DULCOLAX) suppository 10 mg  10 mg Rectal DAILY PRN    morphine injection 2 mg  2 mg IntraVENous Q2H PRN    glucose chewable tablet 16 g  4 Tab Oral PRN    dextrose (D50W) injection syrg 12.5-25 g  12.5-25 g IntraVENous PRN    glucagon (GLUCAGEN) injection 1 mg  1 mg IntraMUSCular PRN    dextrose (D50W) injection syrg 12.5-25 g  12.5-25 g IntraVENous PRN    lisinopriL (PRINIVIL, ZESTRIL) tablet 20 mg  20 mg Oral DAILY          LAB AND IMAGING FINDINGS:     Lab Results   Component Value Date/Time    WBC 12.8 (H) 04/12/2021 01:20 AM    HGB 11.3 (L) 04/12/2021 01:20 AM    PLATELET 003 40/26/1574 01:20 AM     Lab Results   Component Value Date/Time    Sodium 146 (H) 04/12/2021 01:20 AM    Potassium 3.8 04/12/2021 01:20 AM    Chloride 113 (H) 04/12/2021 01:20 AM    CO2 25 04/12/2021 01:20 AM    BUN 35 (H) 04/12/2021 01:20 AM    Creatinine 1.11 04/12/2021 01:20 AM    Calcium 8.0 (L) 04/12/2021 01:20 AM    Magnesium 2.6 (H) 04/12/2021 01:20 AM    Phosphorus 2.0 (L) 04/12/2021 01:20 AM      Lab Results   Component Value Date/Time    Alk. phosphatase 84 04/06/2021 08:24 PM    Protein, total 8.3 (H) 04/06/2021 08:24 PM    Albumin 4.1 04/06/2021 08:24 PM    Globulin 4.2 (H) 04/06/2021 08:24 PM     No results found for: INR, PTMR, PTP, PT1, PT2, APTT, INREXT, INREXT   No results found for: IRON, FE, TIBC, IBCT, PSAT, FERR   No results found for: PH, PCO2, PO2  No components found for: [place]   Lab Results   Component Value Date/Time    CK 56 08/28/2018 09:06 PM    CK - MB <1.0 08/28/2018 09:06 PM                Total time: 70 min  Counseling / coordination time, spent as noted above: 60 min  > 50% counseling / coordination?: y    Prolonged service was provided for  []30 min   []75 min in face to face time in the presence of the patient, spent as noted above. Time Start:   Time End:   Note: this can only be billed with 26577 (initial) or 21201 (follow up). If multiple start / stop times, list each separately.

## 2021-04-12 NOTE — PROGRESS NOTES
Spiritual Care Assessment/Progress Note  ST[address]      NAME: Adri Randall      MRN: [id number]  AGE: 80 y.o. SEX: male  Nondenominational Affiliation: Orthodox   Language: English     4/12/2021     Total Time (in minutes): 7     Spiritual Assessment begun in [facility] Leonardo Isaacs through conversation with:         []Patient        [] Family    [] Friend(s)        Reason for Consult: Palliative Care, Initial/Spiritual Assessment     Spiritual beliefs: (Please include comment if needed)     [] Identifies with a benjamin tradition:         [] Supported by a benjamin community:            [] Claims no spiritual orientation:           [] Seeking spiritual identity:                [] Adheres to an individual form of spirituality:           [x] Not able to assess:                           Identified resources for coping:      [] Prayer                               [] Music                  [] Guided Imagery     [] Family/friends                 [] Pet visits     [] Devotional reading                         [x] Unknown     [] Other:                                             Interventions offered during this visit: (See comments for more details)    Patient Interventions: Initial visit           Plan of Care:     [] Support spiritual and/or cultural needs    [] Support AMD and/or advance care planning process      [] Support grieving process   [] Coordinate Rites and/or Rituals    [] Coordination with community clergy   [] No spiritual needs identified at this time   [] Detailed Plan of Care below (See Comments)  [] Make referral to Music Therapy  [] Make referral to Pet Therapy     [] Make referral to Addiction services  [] Make referral to [facility]  [] Make referral to Spiritual Care Partner  [] No future visits requested        [x] Follow up upon further referrals     Attempted to visit pt for initial spiritual assessment. Unable to complete assessment at this time, pt sleeping and did not awake.   Nitin Quinn MD [place], MS, [address] [employer]  [address] [place] [address] ([phone])

## 2021-04-12 NOTE — PROGRESS NOTES
Physical Therapy: Abort    Chart reviewed, discussed with RN, attempted to see pt for PT eval.  Pt with eyes open and able to visually attend to therapist.  Observed pt moving LUE and LLE spontaneously but never on command. Pt demonstrated R hemiparesis and R neglect. Pt did not follow any commands despite max multimodal cuing. He remains unable to actively participate with therapy and has not been appropriate for therapy services for 6 consecutive days now, will sign off. .  Please re-consult when pt is following basic commands and is able to actively participate with therapy.     Gwen Vargas, PT, DPT

## 2021-04-12 NOTE — ROUTINE PROCESS
Bedside shift change report given to Bradley Sanchez RN (oncoming nurse) by Kp Oswald RN (offgoing nurse). Report included the following information SBAR, Kardex, ED Summary, Intake/Output, MAR, Cardiac Rhythm NSR/ST and Dual Neuro Assessment.

## 2021-04-13 NOTE — CONSULTS
Palliative Medicine Consult  Ron: [phone])    Patient Name: Azucena Marcos  YOB: 1940    Date of Initial Consult: April 12, 2021  Reason for Consult: Care Decisions  Requesting Provider: Dr. Janice Valencia  Primary Care Physician: Elinor Chambers MD     SUMMARY:   Azucena Marcos is a 80 y.o. 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 No surgical interventions offered. Pt with resultant dysphagia, dysarthria, and right sided weakness with high risk of aspiration. .  Recent chart notes indicate that the pt is following some simple commands and moving left side. PMH:  DM2, HTN,   Current medical issues leading to Palliative Medicine involvement include: we have been asked to support with care decisions given residual effects of stroke. 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 PALLIATIVE DIAGNOSES:   1. Dysarthria  2. Feeding difficulties  3. High risk for aspiration  4. Right sided weakness  5. Goals of care     PLAN:   1. Pt seen with daughter, [place] at the bedside and son, Isiah Velasquez on the phone  2. Inquired about family discussions regarding peg placement vs comfort feeds  3. Explained that opinion is that the pt will require nursing home care with peg tube if the decision is to prolong his life  4. Other option is comfort care with hospice  5. Isiah Velasquez is not on the same page as [place] and spouse. I initially offered to have a family meeting to discuss but after listening to the siblings discuss it themselves, I don't feel a family meeting is warranted. 6. Family just needs to make a decision. They understand everything  7. Plan: family to meet and discuss as a unit before thurs.   I will contact the wife of Thurs 4/15/21 for their decision    8. 4/12/21: Wife has a clear understanding of the patient's condition   9. Family is coping with this unfortunate event  10. Wife has a clear understanding of the patient's wishes and his benchmark for quality of life  6. We discussed peg placement. Wife feels if this is the best that he is going to be, then she feels it would be against his wishes to place a peg. Their son eluded to placing the peg to give the pt more time but he will go along with whatever the wife decides  15. Hard making some of these decisions as the pt is now a little bit more responsive and following some commands at times  13. Discussed with attending and neurology team.  Neuro will meet with wife today to answer her questions  14. Informed wife that I would follow up with her tomorrow  13. Continue current care  16. Initial consult note routed to primary continuity provider and/or primary health care team members  17. Communicated plan of care with: Palliative Et[facility]EL[facility] Team     GOALS OF CARE / TREATMENT PREFERENCES:     GOALS OF CARE:  Patient/Health Care Proxy Stated Goals: Other (comment)(still deciding)    TREATMENT PREFERENCES:   Code Status: DNR    Advance Care Planning:  [x] [facility] Interdisciplinary Team has updated the ACP Navigator with Health Care Decision Maker and Patient Capacity      Primary Decision Maker: Keiry Salazar - Spouse - [phone]  No flowsheet data found. Medical Interventions: Other (comment)(continue current care)     Other Instructions: Other:    As far as possible, the palliative care team has discussed with patient / health care proxy about goals of care / treatment preferences for patient.      HISTORY:     History obtained from: chart    CHIEF COMPLAINT: pt admitted with aforementioned history and issues    HPI/SUBJECTIVE:    The patient is:   [] Verbal and participatory  [x] Non-participatory due to:   Medical condition      Clinical Pain Assessment (nonverbal scale for severity on nonverbal patients):   Clinical Pain Assessment  Severity: 0     Activity (Movement): Lying quietly, normal position    Duration: for how long has pt been experiencing pain (e.g., 2 days, 1 month, years)  Frequency: how often pain is an issue (e.g., several times per day, once every few days, constant)     FUNCTIONAL ASSESSMENT:     Palliative Performance Scale (PPS):  PPS: 30       PSYCHOSOCIAL/SPIRITUAL SCREENING:     Palliative IDT has assessed this patient for cultural preferences / practices and a referral made as appropriate to needs (Cultural Services, Patient Advocacy, Ethics, etc.)    Any spiritual / Holiness concerns:  [] Yes /  [x] No    Caregiver Burnout:  [] Yes /  [x] No /  [] No Caregiver Present      Anticipatory grief assessment:   [x] Normal  / [] Maladaptive       ESAS Anxiety: Anxiety: 0    ESAS Depression:          REVIEW OF SYSTEMS:     Positive and pertinent negative findings in ROS are noted above in HPI. The following systems were [] reviewed / [x] unable to be reviewed as noted in HPI  Other findings are noted below. Systems: constitutional, ears/nose/mouth/throat, respiratory, gastrointestinal, genitourinary, musculoskeletal, integumentary, neurologic, psychiatric, endocrine. Positive findings noted below. Modified ESAS Completed by: provider   Fatigue: 10 Drowsiness: 0     Pain: 0   Anxiety: 0 Nausea: 0   Anorexia: 10 Dyspnea: 0                    PHYSICAL EXAM:     From RN flowsheet:  Wt Readings from Last 3 Encounters:   04/13/21 194 lb 7.1 oz (88.2 kg)   04/07/21 169 lb 15.6 oz (77.1 kg)   04/06/21 190 lb 0.6 oz (86.2 kg)     Blood pressure 129/75, pulse 73, temperature 98.7 °F (37.1 °C), resp. rate 15, height 5' 7\" (1.702 m), weight 194 lb 7.1 oz (88.2 kg), SpO2 95 %.     Pain Scale 1: Adult Nonverbal Pain Scale  Pain Intensity 1: 0              Pain Intervention(s) 1: Medication (see MAR)  Last bowel movement, if known:     Constitutional: ill appearing, somnolent   Eyes: anicteric  ENMT: no nasal discharge, moist mucous membranes, +NGT  Cardiovascular: regular rhythm, distal pulses intact  Respiratory: breathing not labored, symmetric  Gastrointestinal:  Musculoskeletal: no deformity, no tenderness to palpation  Skin: warm, dry  Neurologic: somnolent today  Psychiatric:   Other:       HISTORY:     Active Problems:    Basal ganglia hemorrhage ([address] (4/7/2021)      History reviewed. No pertinent past medical history. History reviewed. No pertinent surgical history. History reviewed. No pertinent family history. History reviewed, no pertinent family history.   Social History     Tobacco Use    Smoking status: Not on file   Substance Use Topics    Alcohol use: Not on file     Allergies   Allergen Reactions    Pcn [Penicillins] Rash      Current Facility-Administered Medications   Medication Dose Route Frequency    hydrALAZINE (APRESOLINE) tablet 100 mg  100 mg Oral TID    niCARdipine (CARDENE) 25 mg in 0.9% sodium chloride 250 mL infusion  0-15 mg/hr IntraVENous TITRATE    hydrALAZINE (APRESOLINE) 20 mg/mL injection 10 mg  10 mg IntraVENous Q1H PRN    labetaloL (NORMODYNE;TRANDATE) injection 10 mg  10 mg IntraVENous Q30MIN PRN    insulin glargine (LANTUS) injection 25 Units  25 Units SubCUTAneous DAILY    metroNIDAZOLE (FLAGYL) IVPB premix 500 mg  500 mg IntraVENous Q12H    potassium bicarb-citric acid (EFFER-K) tablet 40 mEq  40 mEq Per G Tube DAILY    insulin lispro (HUMALOG) injection   SubCUTAneous Q6H    metoprolol tartrate (LOPRESSOR) tablet 25 mg  25 mg Oral Q12H    cefTRIAXone (ROCEPHIN) 2 g in 0.9% sodium chloride (MBP/ADV) 50 mL MBP  2 g IntraVENous Q24H    famotidine (PEPCID) tablet 20 mg  20 mg Per NG tube DAILY    levETIRAcetam (KEPPRA) oral solution 500 mg  500 mg Per NG tube Q12H    sodium chloride (NS) flush 5-40 mL  5-40 mL IntraVENous Q8H    sodium chloride (NS) flush 5-40 mL  5-40 mL IntraVENous PRN    acetaminophen (TYLENOL) tablet 650 mg  650 mg Oral Q6H PRN    Or    acetaminophen (TYLENOL) suppository 650 mg  650 mg Rectal Q6H PRN    promethazine (PHENERGAN) tablet 12.5 mg  12.5 mg Oral Q6H PRN    Or    ondansetron (ZOFRAN) injection 4 mg  4 mg IntraVENous Q6H PRN    senna-docusate (PERICOLACE) 8.6-50 mg per tablet 1 Tab  1 Tab Oral BID    bisacodyL (DULCOLAX) suppository 10 mg  10 mg Rectal DAILY PRN    morphine injection 2 mg  2 mg IntraVENous Q2H PRN    glucose chewable tablet 16 g  4 Tab Oral PRN    dextrose (D50W) injection syrg 12.5-25 g  12.5-25 g IntraVENous PRN    glucagon (GLUCAGEN) injection 1 mg  1 mg IntraMUSCular PRN    lisinopriL (PRINIVIL, ZESTRIL) tablet 20 mg  20 mg Oral DAILY          LAB AND IMAGING FINDINGS:     Lab Results   Component Value Date/Time    WBC 13.1 (H) 04/13/2021 05:54 AM    HGB 12.2 04/13/2021 05:54 AM    PLATELET 114 44/94/7508 05:54 AM     Lab Results   Component Value Date/Time    Sodium 148 (H) 04/13/2021 05:54 AM    Potassium 3.9 04/13/2021 05:54 AM    Chloride 114 (H) 04/13/2021 05:54 AM    CO2 25 04/13/2021 05:54 AM    BUN 39 (H) 04/13/2021 05:54 AM    Creatinine 1.14 04/13/2021 05:54 AM    Calcium 8.4 (L) 04/13/2021 05:54 AM    Magnesium 2.9 (H) 04/13/2021 05:54 AM    Phosphorus 4.0 04/13/2021 05:54 AM      Lab Results   Component Value Date/Time    Alk.  phosphatase 84 04/06/2021 08:24 PM    Protein, total 8.3 (H) 04/06/2021 08:24 PM    Albumin 4.1 04/06/2021 08:24 PM    Globulin 4.2 (H) 04/06/2021 08:24 PM     No results found for: INR, PTMR, PTP, PT1, PT2, APTT, INREXT, INREXT   No results found for: IRON, FE, TIBC, IBCT, PSAT, FERR   No results found for: PH, PCO2, PO2  No components found for: [place]   Lab Results   Component Value Date/Time    CK 56 08/28/2018 09:06 PM    CK - MB <1.0 08/28/2018 09:06 PM                Total time: 45 min  Counseling / coordination time, spent as noted above: 35 min  > 50% counseling / coordination?: y    Prolonged service was provided for []30 min   []75 min in face to face time in the presence of the patient, spent as noted above. Time Start:   Time End:   Note: this can only be billed with 44737 (initial) or 58127 (follow up). If multiple start / stop times, list each separately.

## 2021-04-13 NOTE — PROGRESS NOTES
.  [facility]  Hospitalist Group  Sean Dougherty MD  Phone/pager: [phone]      Daily progress note   Transferred out of the ICU on 4/9/2021    Admission summary and hospital course. History and physical was obtained from reviewing his chart and examination of the patient. Patient is not verbal at this time. patient is a 49-year-old male past medical history of type 2 diabetes mellitus, hypertension, who was brought to the emergency department on April 6, 2021 with altered mental status. Baseline patient is well oriented in time place and person. Patient was seen normal on April 5. Patient was found to be somnolent and sleeping in his recliner at the a.m. of April 6. Patient received Narcan on the way to emergency without any changes in his mentation patient was taken to the [facility] ER, CT scan showed large intracranial parenchymal hemorrhage. Patient was transferred to [facility] on April 7. Patient was seen by neurosurgeons and neurologist.  Patient is not a surgical candidate as per neurosurgeons. At ICU, patient noted Cardene to control his blood pressure, and NG tube for dysphagia. Patient CODE STATUS was changed to DNR/DNI. Transferred out of the ICU on 4/9. Subjective complaints/interim history     Awake and alert but more responsive today than the previous days. He could move the left side. Palliative care having continuous discussion with family for care decisions. 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 Acute metabolic encephalopathy due to the above  -Being managed conservatively. Seen by neurology neurosurgery. -BP management to keep SBP<140. BP in target mostly except occasional spikes in the 160-170 when stimulated. Discussed with RN to resume the Cardene and wean down slowly  -Seizure prophylaxis with Keppra  -Neurochecks.  -Seizure and aspiration precautions      Diabetes mellitus type 2, fairly controlled with hemoglobin A1c of 6.4 now with hyperglycemia. Home regimen includes glipizide SR and Metformin.  -Accu-Cheks along with Humalog sliding scale every 6  -Increase dose of Lantus today. Leukocytosis, low-grade fever on 4/9, aspiration suspected. Not septic or toxic. Low grade fever and leukocytosis persisted  -Continue ceftriaxone. Add flagyl(pt with pcn allergy)  -Sputum cx from 4/8 grew mssa. Fever curve and wbc coming down on current regimen,continue. Discussed with pharmacy. Dysphagia  -Getting nutrition via NGT    Hyponatremia: Increase free water flush via the NGT. BMP in a.m. If sodium still high tomorrow, may administer hypotonic IV fluid    Palliative medicine consult to assist with [place] [address] discussion       CODE STATUS: DNR  DVT prophylaxis: SCD  Disposition: To be determined  Prognosis: Poor     Review of systems: Unobtainable, patient nonresponsive. PHYSICAL EXAMINATION:  Visit Vitals  /72   Pulse 86   Temp 99.8 °F (37.7 °C)   Resp 17   Ht 5' 7\" (1.702 m)   Wt 88.2 kg (194 lb 7.1 oz)   SpO2 93%   BMI 30.45 kg/m²     GENERAL: opens eyes,moves left side  HEENT:  NGT in place. NECK: Supple, trachea midline, no adenopathy, no thyromegally or tenderness, no carotid bruit and no JVD. LUNGS:   Vesicular breath sounds bilaterally, no added sounds. HEART:   S1 and S2 well heard,RRR,  no murmur, click, rub or gallop. ABDOMEB:   Soft, non-tender. Normoactive bowel sounds. No masses,  No organomegaly. EXTREMETIES: No edema. NEUROLOGY: Awake, follows commands. Moves left side. Follows some commands. Rigth side is weakl.       Labs:     Recent Labs     04/13/21  0554 04/12/21  0120   WBC 13.1* 12.8*   HGB 12.2 11.3*   HCT 38.8 35.6*    235     Recent Labs     04/13/21  0554 04/12/21  0120 04/11/21  0300   * 146* 142   K 3.9 3.8 3.6   * 113* 113*   CO2 25 25 24   BUN 39* 35* 42* CREA 1.14 1.11 1.15   * 234* 305*   CA 8.4* 8.0* 8.0*   MG 2.9* 2.6* 2.8*   PHOS 4.0 2.0* 3.2     No results for input(s): ALT, AP, TBIL, TBILI, TP, ALB, GLOB, GGT, AML, LPSE in the last 72 hours. No lab exists for component: SGOT, GPT, AMYP, HLPSE  No results for input(s): INR, PTP, APTT, INREXT, INREXT in the last 72 hours. No results for input(s): FE, TIBC, PSAT, FERR in the last 72 hours. No results found for: FOL, RBCF   No results for input(s): PH, PCO2, PO2 in the last 72 hours. No results for input(s): CPK, CKNDX, TROIQ in the last 72 hours. No lab exists for component: CPKMB  Lab Results   Component Value Date/Time    Cholesterol, total 160 04/07/2021 02:25 AM    HDL Cholesterol 60 04/07/2021 02:25 AM    LDL, calculated 69 04/07/2021 02:25 AM    Triglyceride 155 (H) 04/07/2021 02:25 AM    CHOL/HDL Ratio 2.7 04/07/2021 02:25 AM     Lab Results   Component Value Date/Time    Glucose (POC) 280 (H) 04/13/2021 06:24 PM    Glucose (POC) 255 (H) 04/13/2021 12:34 PM    Glucose (POC) 249 (H) 04/13/2021 05:50 AM    Glucose (POC) 214 (H) 04/13/2021 01:15 AM    Glucose (POC) 267 (H) 04/12/2021 07:02 PM     Lab Results   Component Value Date/Time    Color YELLOW/STRAW 04/06/2021 08:29 PM    Appearance CLEAR 04/06/2021 08:29 PM    Specific gravity 1.021 04/06/2021 08:29 PM    pH (UA) 5.5 04/06/2021 08:29 PM    Protein TRACE (A) 04/06/2021 08:29 PM    Glucose Negative 04/06/2021 08:29 PM    Ketone 80 (A) 04/06/2021 08:29 PM    Bilirubin NEGATIVE  08/28/2018 10:46 PM    Urobilinogen 0.2 04/06/2021 08:29 PM    Nitrites Negative 04/06/2021 08:29 PM    Leukocyte Esterase Negative 04/06/2021 08:29 PM    Epithelial cells FEW 04/06/2021 08:29 PM    Bacteria Negative 04/06/2021 08:29 PM    WBC 0-4 04/06/2021 08:29 PM    RBC 0-5 04/06/2021 08:29 PM         CODE STATUS:   Full Code   X DNR    Partial    Comfort Care       Signed:    Evie Castleman, MD  Date of Service:  4/13/2021

## 2021-04-13 NOTE — PROGRESS NOTES
Speech Therapy    Attempted to see patient for swallow treatment. He opened his eyes briefly to voice. Ice chips placed at his lips. He opened his eyes wide and shook his head no several times. Did not accept any PO. Recommend continued NPO at this time. EMELINA Casillas.S., CCC-SLP

## 2021-04-13 NOTE — PROGRESS NOTES
Problem: Falls - Risk of  Goal: *Absence of Falls  Description: Document Glenn Elizondo Fall Risk and appropriate interventions in the flowsheet.   Outcome: Progressing Towards Goal  Note: Fall Risk Interventions:  Mobility Interventions: Assess mobility with egress test, Bed/chair exit alarm, OT consult for ADLs, Patient to call before getting OOB, PT Consult for mobility concerns, PT Consult for assist device competence, Strengthening exercises (ROM-active/passive)    Mentation Interventions: Adequate sleep, hydration, pain control, Bed/chair exit alarm, Door open when patient unattended, Eyeglasses and hearing aids, Increase mobility, More frequent rounding, Reorient patient, Room close to nurse's station, Toileting rounds, Update white board    Medication Interventions: Assess postural VS orthostatic hypotension, Patient to call before getting OOB, Teach patient to arise slowly, Bed/chair exit alarm, Evaluate medications/consider consulting pharmacy    Elimination Interventions: Bed/chair exit alarm, Call light in reach, Patient to call for help with toileting needs, Stay With Me (per policy), Toileting schedule/hourly rounds    History of Falls Interventions: Bed/chair exit alarm, Consult care management for discharge planning, Door open when patient unattended, Evaluate medications/consider consulting pharmacy, Investigate reason for fall, Room close to nurse's station         Problem: Non-Violent Restraints  Goal: Removal from restraints as soon as assessed to be safe  Outcome: Progressing Towards Goal  Goal: No harm/injury to patient while restraints in use  Outcome: Progressing Towards Goal  Goal: Patient's dignity will be maintained  Outcome: Progressing Towards Goal  Goal: Patient Interventions  Outcome: Progressing Towards Goal     Problem: Hemorrhagic Stroke: Day 5 through Discharge  Goal: Activity/Safety  Outcome: Progressing Towards Goal  Goal: Consults, if ordered  Outcome: Progressing Towards Goal  Goal: Diagnostic Test/Procedures  Outcome: Progressing Towards Goal  Goal: Nutrition/Diet  Outcome: Progressing Towards Goal  Goal: Medications  Outcome: Progressing Towards Goal  Goal: Respiratory  Outcome: Progressing Towards Goal  Goal: Treatments/Interventions/Procedures  Outcome: Progressing Towards Goal  Goal: Psychosocial  Outcome: Progressing Towards Goal  Goal: *Hemodynamically stable  Outcome: Progressing Towards Goal  Goal: *Absence of aspiration  Outcome: Progressing Towards Goal  Goal: *Absence of signs and symptoms of DVT  Outcome: Progressing Towards Goal  Goal: *Optimal pain control at patient's stated goal  Outcome: Progressing Towards Goal  Goal: *Tolerating diet  Outcome: Progressing Towards Goal  Goal: *Progressive mobility and function  Outcome: Progressing Towards Goal  Goal: *Rehabilitation readiness  Outcome: Progressing Towards Goal

## 2021-04-13 NOTE — PROGRESS NOTES
Neurology Progress Note  Earline Sharma NP      Date of admission: 4/7/2021    Patient: Arlene Putnam MRN: [id number]  SSN: [id number]    YOB: 1940  Age: 80 y.o. Sex: male        Subjective:     HPI: Arlene Putnam is a 80 y.o. male with a past medical history of HTN who presented to [address] [place] [address] ED on 4/6/21 via EMS after his family found him in his chair unresponsive. He was last seen normal the day before. CT head showed a found to have left frontal lobe IPH measuring 48 x 60 x 54 mm with SAH and IVH with 11 mm left to right midline shift. ICH score 4. Neurosurgery was consulted, no surgical intervention indicated at this time. No known APT or OAC. 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 MRI with similar findings. BP was 165/103 on arrival to [address] [place] [address], but did get as high as 181/101 while in the ED. HgbA1C 6.4, LDL 69. Transferred to [address] [place] [address]. Interval 04/13/21:     More somnolent today, but arousable. Otherwise stable. Review of systems  Review of systems negative except as detailed in the HPI, interval, PMH and A&P    History reviewed. No pertinent past medical history. History reviewed. No pertinent surgical history. History reviewed. No pertinent family history. Social History     Tobacco Use    Smoking status: Not on file   Substance Use Topics    Alcohol use: Not on file      Prior to Admission medications    Medication Sig Start Date End Date Taking? Authorizing Provider   lisinopriL (PRINIVIL, ZESTRIL) 20 mg tablet Take  by mouth two (2) times a day. Yes Provider, Historical   glipiZIDE SR (GLUCOTROL XL) 10 mg CR tablet Take 5 mg by mouth daily. Yes Provider, Historical   hydroCHLOROthiazide (MICROZIDE) 12.5 mg capsule Take 12.5 mg by mouth daily. Yes Provider, Historical   metFORMIN (GLUCOPHAGE) 1,000 mg tablet Take 1,000 mg by mouth two (2) times daily (with meals).    Yes Provider, Historical   ondansetron (ZOFRAN ODT) 4 mg disintegrating tablet Take 1 Tab by mouth every eight (8) hours as needed for Nausea.  8/28/18   Franc Child DO     Current Facility-Administered Medications   Medication Dose Route Frequency Provider Last Rate Last Admin    niCARdipine (CARDENE) 25 mg in 0.9% sodium chloride 250 mL infusion  0-15 mg/hr IntraVENous TITRATE Aubrey Gandhi NP   Stopped at 04/12/21 1232    hydrALAZINE (APRESOLINE) 20 mg/mL injection 10 mg  10 mg IntraVENous Q1H PRN Aubrey Gandhi NP   10 mg at 04/13/21 0640    labetaloL (NORMODYNE;TRANDATE) injection 10 mg  10 mg IntraVENous Q30MIN PRN Aubrey Gandhi NP   10 mg at 04/13/21 0603    insulin glargine (LANTUS) injection 25 Units  25 Units SubCUTAneous DAILY Michi Byrd MD   25 Units at 04/13/21 0855    metroNIDAZOLE (FLAGYL) IVPB premix 500 mg  500 mg IntraVENous Q12H Michi Byrd  mL/hr at 04/13/21 0117 500 mg at 04/13/21 0117    potassium bicarb-citric acid (EFFER-K) tablet 40 mEq  40 mEq Per G Tube DAILY Michi Byrd MD   40 mEq at 04/13/21 0856    insulin lispro (HUMALOG) injection   SubCUTAneous Q6H Michi Byrd MD   3 Units at 04/13/21 0600    metoprolol tartrate (LOPRESSOR) tablet 25 mg  25 mg Oral Q12H Michi Byrd MD   25 mg at 04/13/21 0855    cefTRIAXone (ROCEPHIN) 2 g in 0.9% sodium chloride (MBP/ADV) 50 mL MBP  2 g IntraVENous Q24H Amber Garcia MD   Stopped at 04/12/21 1300    hydrALAZINE (APRESOLINE) tablet 50 mg  50 mg Oral TID Amber Garcia MD   50 mg at 04/13/21 0856    famotidine (PEPCID) tablet 20 mg  20 mg Per NG tube DAILY Nu Patel NP   20 mg at 04/13/21 0856    levETIRAcetam (KEPPRA) oral solution 500 mg  500 mg Per NG tube Q12H Nu Patel NP   500 mg at 04/13/21 0443    sodium chloride (NS) flush 5-40 mL  5-40 mL IntraVENous Q8H LAZARO MartinezP   10 mL at 04/13/21 0603    sodium chloride (NS) flush 5-40 mL  5-40 mL IntraVENous PRN Riky Lown, ACNP        acetaminophen (TYLENOL) tablet 650 mg  650 mg Oral Q6H PRN Riky Lown, ACNP   650 mg at 21 5732    Or    acetaminophen (TYLENOL) suppository 650 mg  650 mg Rectal Q6H PRN Riky Lown, ACNP        promethazine (PHENERGAN) tablet 12.5 mg  12.5 mg Oral Q6H PRN Riky Lown, ACNP        Or    ondansetron [facility]) injection 4 mg  4 mg IntraVENous Q6H PRN Riky Lown, ACNP        senna-docusate (PERICOLACE) 8.6-50 mg per tablet 1 Tab  1 Tab Oral BID Riky Lown, ACNP   1 Tab at 21 0028    bisacodyL (DULCOLAX) suppository 10 mg  10 mg Rectal DAILY PRN Riky Lown, ACNP        morphine injection 2 mg  2 mg IntraVENous Q2H PRN Riky Lown, ACNP   2 mg at 21 1350    glucose chewable tablet 16 g  4 Tab Oral PRN Riky Lown, ACNP        dextrose (D50W) injection syrg 12.5-25 g  12.5-25 g IntraVENous PRN Riky Lown, ACNP        glucagon [facility]) injection 1 mg  1 mg IntraMUSCular PRN Riky Lown, ACNP        dextrose (D50W) injection syrg 12.5-25 g  12.5-25 g IntraVENous PRN Lea Lyman, IVONNE        lisinopriL (PRINIVIL, ZESTRIL) tablet 20 mg  20 mg Oral DAILY Riky Lown, ACNP   20 mg at 21 2145        Allergies   Allergen Reactions    Pcn [Penicillins] Rash       Objective:     Vitals:    21 0603 21 0640 21 0706 21 0855   BP: (!) 181/105 (!) 182/105 (!) 142/86 (!) 160/93   Pulse: 76  74 70   Resp: 18  19    Temp: 98.9 °F (37.2 °C)      SpO2: 94%  95%         Temp (24hrs), Av.9 °F (37.2 °C), Min:98.4 °F (36.9 °C), Max:99.5 °F (37.5 °C)        O2 Device: Nasal cannula  O2 Flow Rate (L/min): 2 l/min      Intake/Output Summary (Last 24 hours) at 2021 0925  Last data filed at 2021 0706  Gross per 24 hour   Intake 500 ml   Output 1300 ml   Net -800 ml       General: Appears a little anxious  Cardiac: RRR  Lungs: Unlabored breathing. Abdomen: Soft/NT/non-distended. Extremities. No edema. Neurologic Exam:  Mental Status:  Somnolent but arousable. Briefly regards. Follows simple commands    Language:    Grossly intact comprehension. No speech    Cranial Nerves:   Pupils equal, round and reactive to light. Blinks to threat bilaterally     Mild L ptosis     Conjugate gaze       Tracks examiner well. Facial grossly symmetric, but not assessed with activation     Hearing grossly intact. Motor:    Bulk and tone normal.      RUE 1/5     LUE antigravity, strong     RLE 2-3/5     LLE appears intact     No involuntary movements. Sensation:    Too somnolent to assess today 4/13. Denied sensation RUE and could feel in RLE on 4/12    Coordination & Gait: deferred    LABS:  Recent Labs     04/13/21  0554 04/12/21  0120 04/11/21  0300   WBC 13.1* 12.8* 13.5*   HGB 12.2 11.3* 11.8*   HCT 38.8 35.6* 37.3    235 217     Recent Labs     04/13/21  0554 04/12/21  0120 04/11/21  0300   * 146* 142   K 3.9 3.8 3.6   * 113* 113*   CO2 25 25 24   BUN 39* 35* 42*   CREA 1.14 1.11 1.15   * 234* 305*   CA 8.4* 8.0* 8.0*   MG 2.9* 2.6* 2.8*   PHOS 4.0 2.0* 3.2     No results for input(s): ALT, AP, TBIL, TBILI, TP, ALB, GLOB, GGT, AML, LPSE in the last 72 hours. No lab exists for component: SGOT, GPT, AMYP, HLPSE  No results for input(s): INR, PTP, APTT, INREXT, INREXT in the last 72 hours. No results for input(s): PHI, PCO2I, PO2I, HCO3I in the last 72 hours. No results for input(s): CPK, CKNDX, TROIQ in the last 72 hours.     No lab exists for component: CPKMB  Lab Results   Component Value Date/Time    Cholesterol, total 160 04/07/2021 02:25 AM    HDL Cholesterol 60 04/07/2021 02:25 AM    LDL, calculated 69 04/07/2021 02:25 AM    Triglyceride 155 (H) 04/07/2021 02:25 AM    CHOL/HDL Ratio 2.7 04/07/2021 02:25 AM     Lab Results   Component Value Date/Time    Glucose (POC) 249 (H) 04/13/2021 05:50 AM    Glucose (POC) 214 (H) 04/13/2021 01:15 AM    Glucose (POC) 267 (H) 04/12/2021 07:02 PM    Glucose (POC) 259 (H) 04/12/2021 11:37 AM    Glucose (POC) 210 (H) 04/12/2021 06:02 AM     Lab Results   Component Value Date/Time    Color YELLOW/STRAW 04/06/2021 08:29 PM    Appearance CLEAR 04/06/2021 08:29 PM    Specific gravity 1.021 04/06/2021 08:29 PM    pH (UA) 5.5 04/06/2021 08:29 PM    Protein TRACE (A) 04/06/2021 08:29 PM    Glucose Negative 04/06/2021 08:29 PM    Ketone 80 (A) 04/06/2021 08:29 PM    Bilirubin NEGATIVE  08/28/2018 10:46 PM    Urobilinogen 0.2 04/06/2021 08:29 PM    Nitrites Negative 04/06/2021 08:29 PM    Leukocyte Esterase Negative 04/06/2021 08:29 PM    Epithelial cells FEW 04/06/2021 08:29 PM    Bacteria Negative 04/06/2021 08:29 PM    WBC 0-4 04/06/2021 08:29 PM    RBC 0-5 04/06/2021 08:29 PM       No results for input(s): FE, TIBC, PSAT, FERR in the last 72 hours. No results found for: FOL, RBCF   No results for input(s): PH, PCO2, PO2 in the last 72 hours. No results for input(s): CPK, CKNDX, TROIQ in the last 72 hours. No lab exists for component: CPKMB    Imaging:  No results found. CT Results:  Results from Hospital Encounter encounter on 04/07/21   CT HEAD WO CONT    Narrative HEAD CT WITHOUT CONTRAST: 4/8/2021 1:35 AM    INDICATION: assess hemorrhage    COMPARISON: 4/7/2021, 4/6/2021. MRI brain 4/7/2021. PROCEDURE: Axial images of the head were obtained without contrast. Coronal and  sagittal reformats were performed. CT dose reduction was achieved through use of  a standardized protocol tailored for this examination and automatic exposure  control for dose modulation. FINDINGS: Left frontal parenchymal hemorrhage is minimally decreased. It  measures 45 x 57 x 51 mm, versus 48 x 60 x 54 mm on 4/6/2021. Edema and  petechial hemorrhages surrounded in the left frontal lobe. There is effacement  of the local sulci and stable, 12 mm, left to right midline shift. Hemorrhage  extends through the wall of the left lateral ventricle, and subarachnoid  hemorrhages layers dependently in the left greater than right lateral  ventricles. This is also stable. Dilation of the left lateral ventricle,  particularly the temporal horn, is stable. A tiny subarachnoid hemorrhage in the posterior right sylvian fissure is more  faint and subtle today (401-58). Trace hyperdensity in a chronic, right inferior cerebellar infarction (2-8)  could represent hemorrhage. However, there was no magnetic susceptibility  artifact within this on yesterday's MRI. Trace subarachnoid hemorrhage extends  through the foramina of [place]. Impression 1. Stable left frontal parenchymal hemorrhage. 2. Stable mass effect. CTA NECK    Narrative EXAM: CTA HEAD, CTA NECK    INDICATION: assess for vascular source of hemorrhage    COMPARISON: CT of the head dated April 7, 2021 and April 6, 2021. CONTRAST: 100 mL of Isovue-370. TECHNIQUE:  Unenhanced  images were obtained to localize the volume for  acquisition. Multislice helical axial CT angiography was performed from the  aortic arch to the top of the head during uneventful rapid bolus intravenous  contrast administration. Coronal and sagittal reformations and 3D post  processing was performed. CT dose reduction was achieved through use of a  standardized protocol tailored for this examination and automatic exposure  control for dose modulation. FINDINGS:    DELAYED ENHANCEMENT HEAD CT  4.8 x 5.8 cm parenchymal hemorrhage centered on the left and frontal lobe and  left basal ganglia is unchanged from yesterday's exam. Prominent surrounding  edema is redemonstrated. Blood layering in the posterior horns of the bilateral  ventricle is unchanged. 11 mm rightward shift of the septum pellucidum is  unchanged. Previous identified small area of right-sided subarachnoid hemorrhage  is not visualized.     The left posterior horn is slightly enlarged, but unchanged from yesterday's  exam. The left frontal horn is effaced. Remote right cerebellar infarct redemonstrated. Dural venous sinuses are patent. No abnormal parenchymal or meningeal  enhancement. Mastoid air cells and paranasal sinuses are clear. CTA NECK  There is conventional three vessel arch anatomy. The bilateral subclavian,  common carotid, and internal carotid arteries are patent with no flow-limiting  stenosis. % of right carotid artery stenosis: 0  % of left carotid artery stenosis: 0  Measurements utilize NASCET criteria. NASCET method was utilized for calculating stenosis. Left vertebral artery is widely patent, the right is patent but somewhat  diminutive. The cervical soft tissues are unremarkable. There are degenerative  changes of the cervical spine. CTA HEAD  Moderate atherosclerotic disease of the bilateral intracranial internal carotid  arteries, without hemodynamically significant stenosis. Focal stenosis of the left A2 segment (602-52), nonocclusive. The anterior communicating artery is patent. The left vertebral artery is patent, with focal moderate atherosclerotic  disease, no hemodynamically significant stenosis. The right vertebral artery is  patent but diminutive. The right PICA is occluded, likely chronic given the  presence of remote infarct. The basilar artery is patent. The bilateral  posterior communicating arteries are diminutive. The bilateral P1 segments are  widely patent. The posterior cerebral arteries are patent. There is a tiny but prominent perforating branch within the area of the left  parenchymal hemorrhage, without aneurysm, active extravasation, or vascular  malformation. Impression 1. Stable left frontal/basal ganglia parenchymal hemorrhage, with surrounding  edema. Associated mass effect, including 11 mm rightward midline shift and  enlargement of the left posterior horn, is unchanged.  Bilateral intraventricular  hemorrhage is also unchanged. 2. No aneurysm or any other vascular abnormality. No active extravasation. 3. Chronically occluded right PICA, with associated chronic right cerebellar  infarct. 4. Focal stenosis of the left A2 segment. CTA HEAD    Narrative EXAM: CTA HEAD, CTA NECK    INDICATION: assess for vascular source of hemorrhage    COMPARISON: CT of the head dated April 7, 2021 and April 6, 2021. CONTRAST: 100 mL of Isovue-370. TECHNIQUE:  Unenhanced  images were obtained to localize the volume for  acquisition. Multislice helical axial CT angiography was performed from the  aortic arch to the top of the head during uneventful rapid bolus intravenous  contrast administration. Coronal and sagittal reformations and 3D post  processing was performed. CT dose reduction was achieved through use of a  standardized protocol tailored for this examination and automatic exposure  control for dose modulation. FINDINGS:    DELAYED ENHANCEMENT HEAD CT  4.8 x 5.8 cm parenchymal hemorrhage centered on the left and frontal lobe and  left basal ganglia is unchanged from yesterday's exam. Prominent surrounding  edema is redemonstrated. Blood layering in the posterior horns of the bilateral  ventricle is unchanged. 11 mm rightward shift of the septum pellucidum is  unchanged. Previous identified small area of right-sided subarachnoid hemorrhage  is not visualized. The left posterior horn is slightly enlarged, but unchanged from yesterday's  exam. The left frontal horn is effaced. Remote right cerebellar infarct redemonstrated. Dural venous sinuses are patent. No abnormal parenchymal or meningeal  enhancement. Mastoid air cells and paranasal sinuses are clear. CTA NECK  There is conventional three vessel arch anatomy. The bilateral subclavian,  common carotid, and internal carotid arteries are patent with no flow-limiting  stenosis.     % of right carotid artery stenosis: 0  % of left carotid artery stenosis: 0  Measurements utilize NASCET criteria. NASCET method was utilized for calculating stenosis. Left vertebral artery is widely patent, the right is patent but somewhat  diminutive. The cervical soft tissues are unremarkable. There are degenerative  changes of the cervical spine. CTA HEAD  Moderate atherosclerotic disease of the bilateral intracranial internal carotid  arteries, without hemodynamically significant stenosis. Focal stenosis of the left A2 segment (602-52), nonocclusive. The anterior communicating artery is patent. The left vertebral artery is patent, with focal moderate atherosclerotic  disease, no hemodynamically significant stenosis. The right vertebral artery is  patent but diminutive. The right PICA is occluded, likely chronic given the  presence of remote infarct. The basilar artery is patent. The bilateral  posterior communicating arteries are diminutive. The bilateral P1 segments are  widely patent. The posterior cerebral arteries are patent. There is a tiny but prominent perforating branch within the area of the left  parenchymal hemorrhage, without aneurysm, active extravasation, or vascular  malformation. Impression 1. Stable left frontal/basal ganglia parenchymal hemorrhage, with surrounding  edema. Associated mass effect, including 11 mm rightward midline shift and  enlargement of the left posterior horn, is unchanged. Bilateral intraventricular  hemorrhage is also unchanged. 2. No aneurysm or any other vascular abnormality. No active extravasation. 3. Chronically occluded right PICA, with associated chronic right cerebellar  infarct. 4. Focal stenosis of the left A2 segment. MRI Results:  Results from [place] encounter on 04/07/21   MRI BRAIN WO CONT    Narrative EXAM: MRI BRAIN WO CONT    INDICATION: assess ich    COMPARISON: CT head 4/7/2021. CONTRAST: None.     TECHNIQUE:    Multiplanar multisequence acquisition without contrast of the brain. FINDINGS:  Evaluation is significantly limited by motion. No significant change in size of left basal ganglia and frontal lobe  intraparenchymal hemorrhage measuring 6.9 x 5.0 x 5.5 cm, with fluid fluid  levels noted. There is unchanged surrounding edema within the left frontal lobe,  basal ganglia and insula, with mass effect resulting in 14 mm of rightward  midline shift. Basilar cisterns remain patent. There is stable intraventricular hemorrhage within the occipital horns, third  ventricle and body of the left lateral ventricle. Stable size of the ventricles. Few punctate foci of acute ischemic infarct in the parasagittal left  frontoparietal lobe (series 5 image 50), and in the left lateral frontal lobe  (series 5 images 51 and 52). Generalized parenchymal volume loss with commensurate dilation of the sulci and  ventricular system. Scattered periventricular and deep white matter T2/FLAIR  hyperintensities, consistent with mild chronic microvascular ischemic disease,  with multiple small chronic white matter infarcts within the corona radiata and  centrum semiovale. Chronic infarct in the right inferior cerebellum. There is no  cerebellar tonsillar herniation. Expected arterial flow-voids are present. Mild mucosal thickening the bilateral ethmoidal air cells and left sphenoid  sinus. The mastoid air cells and middle ears are clear. The orbital contents are  within normal limits. No significant osseous or scalp lesions are identified. Impression Evaluation is significantly limited by motion. 1. Stable size of left basal ganglia and frontal lobe intraparenchymal  hemorrhage, with stable surrounding edema and mass effect resulting in 14 mm of  rightward midline shift. 2. Stable intraventricular hemorrhage. Stable ventricular size.   3. Few punctate foci of acute infarction in the parasagittal left frontoparietal  lobe and left lateral frontal lobe.  4. Generalized parenchymal volume loss and mild chronic microvascular ischemic  disease with multiple small chronic white matter infarcts. Chronic infarct in  the right inferior cerebellum. XR Results   Results from [place] encounter on 04/07/21   XR ABD (KUB)    Impression Appropriate Dobbhoff tube placement. XR CHEST PORT    Impression Mild atelectasis left lower lobe. XR CHEST PORT    Impression 1. Improved aeration with persistent bibasilar areas of linear atelectasis right  greater than left. This could be due to mucous plugging. Aspiration can cause  mucous plugging. VAS/US Results (maximum last 3): No results found for this or any previous visit. TTE  04/07/21   ECHO ADULT COMPLETE 04/08/2021 4/8/2021    Narrative · Image quality for this study was technically difficult. · LV: Estimated LVEF is 60 - 65%. Normal cavity size and systolic function   (ejection fraction normal). Moderate concentric hypertrophy. Wall motion:   unable to assess due to limited image quality. · IAS: No atrial septal defect present. Signed by: Montse Garcia MD         EKG  Results for orders placed or performed during the hospital encounter of 04/06/21   EKG, 12 LEAD, INITIAL   Result Value Ref Range    Ventricular Rate 104 BPM    Atrial Rate 104 BPM    P-R Interval 168 ms    QRS Duration 78 ms    Q-T Interval 348 ms    QTC Calculation (Bezet) 457 ms    Calculated P Axis 30 degrees    Calculated R Axis -19 degrees    Calculated T Axis 63 degrees    Diagnosis       Sinus tachycardia  Minimal voltage criteria for LVH, may be normal variant  When compared with ECG of 28-AUG-2018 21:01,  premature atrial complexes are no longer present  Vent.  rate has increased BY  36 BPM  Confirmed by Dianne Terry, P.V. ([id number]) on 4/6/2021 11:34:39 PM         Hospital Problems  Never Reviewed          Codes Class Noted POA    Basal ganglia hemorrhage ([facility] [address] ICD-10-CM: I61.0  ICD-9-CM: 394  4/7/2021 Unknown Assessment/Plan:     Azucena Marcos is a 80 y.o. male found minimally responsive on 4/6/21 with evidence of left frontal lobe ICH, SAH/IVH likely hypertensive. No vascular malformation noted on CTA H/N. Pt is quite alert today 4/12. Appears to have good comprehension as he follows most commands and nods appropriately. Currently with no speech and will likely have significant expressive aphasia given the location of the hemorrhage. Will likely need feeding tube but may recover sufficiently long-term to swallow safely. Dense RUE plegia and weak RLE. SBP <160 per [facility]'s ICH guidelines  Keppra for seizure prophylaxis. GOC discussion  Poor prognosis.         Thank you for this consult.     Signed By: Nupur Cisneros NP     April 13, 2021 10:29 AM

## 2021-04-13 NOTE — PROGRESS NOTES
Problem: Falls - Risk of  Goal: *Absence of Falls  Description: Document Lissett Barker Fall Risk and appropriate interventions in the flowsheet. Outcome: Progressing Towards Goal  Note: Fall Risk Interventions:  Mobility Interventions: Communicate number of staff needed for ambulation/transfer, OT consult for ADLs, Bed/chair exit alarm    Mentation Interventions: Bed/chair exit alarm    Medication Interventions: Evaluate medications/consider consulting pharmacy, Bed/chair exit alarm    Elimination Interventions: Call light in reach    History of Falls Interventions: Consult care management for discharge planning, Door open when patient unattended, Vital signs minimum Q4HRs X 24 hrs (comment for end date)         Problem: Pressure Injury - Risk of  Goal: *Prevention of pressure injury  Description: Document Desmond Scale and appropriate interventions in the flowsheet.   Outcome: Progressing Towards Goal  Note: Pressure Injury Interventions:  Sensory Interventions: Assess changes in LOC    Moisture Interventions: Absorbent underpads    Activity Interventions: PT/OT evaluation    Mobility Interventions: HOB 30 degrees or less    Nutrition Interventions: Document food/fluid/supplement intake    Friction and Shear Interventions: Lift sheet                Problem: Non-Violent Restraints  Goal: Removal from restraints as soon as assessed to be safe  Outcome: Progressing Towards Goal  Goal: No harm/injury to patient while restraints in use  Outcome: Progressing Towards Goal  Goal: Patient's dignity will be maintained  Outcome: Progressing Towards Goal  Goal: Patient Interventions  Outcome: Progressing Towards Goal

## 2021-04-14 NOTE — PROGRESS NOTES
Bedside and Verbal shift change report given to [address] [place], [place] [address]way 1 (oncoming nurse) by Asa Score (offgoing nurse). Report included the following information SBAR, Kardex, Intake/Output, MAR, Recent Results, Cardiac Rhythm NSR and Dual Neuro Assessment.

## 2021-04-14 NOTE — PROGRESS NOTES
.  [address] [facility] Adult  Hospitalist Group  Srini Sam MD  Phone/pager: [phone]      Daily progress note   Transferred out of the ICU on 4/9/2021    Admission summary and hospital course. History and physical was obtained from reviewing his chart and examination of the patient. Patient is not verbal at this time. patient is a 27-year-old male past medical history of type 2 diabetes mellitus, hypertension, who was brought to the emergency department on April 6, 2021 with altered mental status. Baseline patient is well oriented in time place and person. Patient was seen normal on April 5. Patient was found to be somnolent and sleeping in his recliner at the a.m. of April 6. Patient received Narcan on the way to emergency without any changes in his mentation patient was taken to the [facility] ER, CT scan showed large intracranial parenchymal hemorrhage. Patient was transferred to [address] [place] [address] on April 7. Patient was seen by neurosurgeons and neurologist.  Patient is not a surgical candidate as per neurosurgeons. At ICU, patient noted Cardene to control his blood pressure, and NG tube for dysphagia. Patient CODE STATUS was changed to DNR/DNI. Transferred out of the ICU on 4/9. Subjective complaints/interim history     Awake,opens eyes to voice. 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 Acute metabolic encephalopathy due to the above  -Being managed conservatively. Seen by neurology neurosurgery. -BP management to keep SBP<140. BP in target mostly except occasional spikes in the 160-170 when stimulated. Discussed with RN to resume the Cardene and wean down slowly  -Seizure prophylaxis with Keppra  -Neurochecks.  -Seizure and aspiration precautions      Diabetes mellitus type 2, fairly controlled with hemoglobin A1c of 6.4 now with hyperglycemia. Home regimen includes glipizide SR and Metformin.  -Accu-Cheks along with Humalog sliding scale every 6  -Increase dose of Lantus today. Leukocytosis, low-grade fever on 4/9, aspiration suspected. Not septic or toxic. Low grade fever and leukocytosis persisted  -Continue ceftriaxone. Add flagyl(pt with pcn allergy)  -Sputum cx from 4/8 grew mssa. Fever curve and wbc coming down on current regimen,continue. Discussed with pharmacy. Dysphagia  -Getting nutrition via NGT but now being transitioned to comfort care. Hyponatremia: Increase free water flush via the NGT. Palliative medicine consult to assist with GoC discussion       CODE STATUS: DNR  DVT prophylaxis: SCD  Palliative medicine met with family plan to transition to comfort care,hospice consulted. Review of systems: Unobtainable, patient nonresponsive. PHYSICAL EXAMINATION:  Visit Vitals  /66 (BP 1 Location: Left upper arm, BP Patient Position: At rest)   Pulse 84   Temp 98.3 °F (36.8 °C)   Resp 21   Ht 5' 7\" (1.702 m)   Wt 88.2 kg (194 lb 7.1 oz)   SpO2 95%   BMI 30.45 kg/m²     GENERAL: opens eyes,moves left side  HEENT:  NGT in place. NECK: Supple, trachea midline, no adenopathy, no thyromegally or tenderness, no carotid bruit and no JVD. LUNGS:   Vesicular breath sounds bilaterally, no added sounds. HEART:   S1 and S2 well heard,RRR,  no murmur, click, rub or gallop. ABDOMEB:   Soft, non-tender. Normoactive bowel sounds. No masses,  No organomegaly. EXTREMETIES: No edema. NEUROLOGY: Awake, follows commands. Moves left side. Follows some commands. Rigth side is weakl.       Labs:     Recent Labs     04/14/21  0511 04/13/21  0554   WBC 15.2* 13.1*   HGB 12.7 12.2   HCT 40.8 38.8    238     Recent Labs     04/14/21  0511 04/13/21  0554 04/12/21  0120   * 148* 146*   K 3.5 3.9 3.8   * 114* 113*   CO2 26 25 25   BUN 46* 39* 35*   CREA 1.44* 1.14 1.11   * 266* 234*   CA 8.3* 8.4* 8.0*   MG 3.0* 2.9* 2.6*   PHOS 3.4 4.0 2.0*     No results for input(s): ALT, AP, TBIL, TBILI, TP, ALB, GLOB, GGT, AML, LPSE in the last 72 hours. No lab exists for component: SGOT, GPT, AMYP, HLPSE  No results for input(s): INR, PTP, APTT, INREXT, INREXT in the last 72 hours. No results for input(s): FE, TIBC, PSAT, FERR in the last 72 hours. No results found for: FOL, RBCF   No results for input(s): PH, PCO2, PO2 in the last 72 hours. No results for input(s): CPK, CKNDX, TROIQ in the last 72 hours. No lab exists for component: CPKMB  Lab Results   Component Value Date/Time    Cholesterol, total 160 04/07/2021 02:25 AM    HDL Cholesterol 60 04/07/2021 02:25 AM    LDL, calculated 69 04/07/2021 02:25 AM    Triglyceride 155 (H) 04/07/2021 02:25 AM    CHOL/HDL Ratio 2.7 04/07/2021 02:25 AM     Lab Results   Component Value Date/Time    Glucose (POC) 347 (H) 04/14/2021 12:17 PM    Glucose (POC) 295 (H) 04/14/2021 06:41 AM    Glucose (POC) 317 (H) 04/13/2021 11:46 PM    Glucose (POC) 280 (H) 04/13/2021 06:24 PM    Glucose (POC) 255 (H) 04/13/2021 12:34 PM     Lab Results   Component Value Date/Time    Color YELLOW/STRAW 04/06/2021 08:29 PM    Appearance CLEAR 04/06/2021 08:29 PM    Specific gravity 1.021 04/06/2021 08:29 PM    pH (UA) 5.5 04/06/2021 08:29 PM    Protein TRACE (A) 04/06/2021 08:29 PM    Glucose Negative 04/06/2021 08:29 PM    Ketone 80 (A) 04/06/2021 08:29 PM    Bilirubin NEGATIVE  08/28/2018 10:46 PM    Urobilinogen 0.2 04/06/2021 08:29 PM    Nitrites Negative 04/06/2021 08:29 PM    Leukocyte Esterase Negative 04/06/2021 08:29 PM    Epithelial cells FEW 04/06/2021 08:29 PM    Bacteria Negative 04/06/2021 08:29 PM    WBC 0-4 04/06/2021 08:29 PM    RBC 0-5 04/06/2021 08:29 PM         CODE STATUS:   Full Code   X DNR    Partial    Comfort Care       Signed:    Marko Dunham MD  Date of Service:  4/14/2021

## 2021-04-14 NOTE — PROGRESS NOTES
Palliative Medicine Consult  Ron: [phone] ([phone])    Patient Name: Nu Campos  YOB: 1940    Date of Initial Consult: April 12, 2021  Reason for Consult: Care Decisions  Requesting Provider: Dr. Dmitriy Rivera  Primary Care Physician: Margaree Kussmaul, MD     SUMMARY:   Nu Campos is a 80 y.o. male admitted on 4/7/2021 as a transfer from [address] [place] [address] for neurosurgical services with a diagnosis of AMS due to large left basal ganglia and frontal lobe intracranial parenchymal hemorrhage. No surgical interventions offered. Pt with resultant dysphagia, dysarthria, and right sided weakness with high risk of aspiration. .  Recent chart notes indicate that the pt is following some simple commands and moving left side. PMH:  DM2, HTN,   Current medical issues leading to Palliative Medicine involvement include: we have been asked to support with care decisions given residual effects of stroke. 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 PALLIATIVE DIAGNOSES:   1. Dysarthria  2. Feeding difficulties  3. High risk for aspiration  4. Right sided weakness  5. Goals of care  6. End of life     PLAN:   1. 4/14/21: family meeting held with wife. Family has gathered and decision made to not place peg. Consensus that the pt would not want to be prolonged in this condition  2. We discussed enrolling in hospice care. 3. Explained the different levels of hospice in great detail including the criteria  4. We discussed disposition options  5. Reviewed comfort care and explained that we could begin that process now  6. Wife agrees to transition to comfort measures today  7. Comfort care orders placed  8. All questions and concerns addressed  9. Will continue to follow up   10. 4/13/21: Pt seen with daughter, Maddi Odonnell at the bedside and son, Tashia Vega on the phone  11.  Inquired about family discussions regarding peg placement vs comfort feeds  12. Explained that opinion is that the pt will require nursing home care with peg tube if the decision is to prolong his life  15. Other option is comfort care with hospice  14. Lyn Cordova is not on the same page as Joseph Pope and spouse. I initially offered to have a family meeting to discuss but after listening to the siblings discuss it themselves, I don't feel a family meeting is warranted. 13. Family just needs to make a decision. They understand everything  16. Plan: family to meet and discuss as a unit before thurs. I will contact the wife of [place] 4/15/21 for their decision    17. 4/12/21: Wife has a clear understanding of the patient's condition   18. Family is coping with this unfortunate event  23. Wife has a clear understanding of the patient's wishes and his benchmark for quality of life  21. We discussed peg placement. Wife feels if this is the best that he is going to be, then she feels it would be against his wishes to place a peg. Their son eluded to placing the peg to give the pt more time but he will go along with whatever the wife decides  24. Hard making some of these decisions as the pt is now a little bit more responsive and following some commands at times  22. Discussed with attending and neurology team.  Neuro will meet with wife today to answer her questions  21. Informed wife that I would follow up with her tomorrow  25. Continue current care  25. Initial consult note routed to primary continuity provider and/or primary health care team members  32.  Communicated plan of care with: Palliative Etienne FIELDS [facility] Team     GOALS OF CARE / TREATMENT PREFERENCES:     GOALS OF CARE:  Patient/Health Care Proxy Stated Goals: Comfort    TREATMENT PREFERENCES:   Code Status: DNR    Advance Care Planning:  [x] [facility] Interdisciplinary Team has updated the ACP Navigator with Health Care Decision Maker and Patient Capacity      Primary Decision Maker: Rico Habermann - [facility] - [phone]  No flowsheet data found. Medical Interventions: Comfort measures     Other Instructions:   Artificially Administered Nutrition: No feeding tube     Other:    As far as possible, the palliative care team has discussed with patient / health care proxy about goals of care / treatment preferences for patient. HISTORY:     History obtained from: chart    CHIEF COMPLAINT: pt admitted with aforementioned history and issues    HPI/SUBJECTIVE:    The patient is:   [] Verbal and participatory  [x] Non-participatory due to:   Medical condition      Clinical Pain Assessment (nonverbal scale for severity on nonverbal patients):   Clinical Pain Assessment  Severity: 0     Activity (Movement): Lying quietly, normal position    Duration: for how long has pt been experiencing pain (e.g., 2 days, 1 month, years)  Frequency: how often pain is an issue (e.g., several times per day, once every few days, constant)     FUNCTIONAL ASSESSMENT:     Palliative Performance Scale (PPS):  PPS: 40       PSYCHOSOCIAL/SPIRITUAL SCREENING:     Palliative IDT has assessed this patient for cultural preferences / practices and a referral made as appropriate to needs (Cultural Services, Patient Advocacy, Ethics, etc.)    Any spiritual / Shinto concerns:  [] Yes /  [x] No    Caregiver Burnout:  [] Yes /  [x] No /  [] No Caregiver Present      Anticipatory grief assessment:   [x] Normal  / [] Maladaptive       ESAS Anxiety: Anxiety: 1    ESAS Depression:          REVIEW OF SYSTEMS:     Positive and pertinent negative findings in ROS are noted above in HPI. The following systems were [] reviewed / [x] unable to be reviewed as noted in HPI  Other findings are noted below. Systems: constitutional, ears/nose/mouth/throat, respiratory, gastrointestinal, genitourinary, musculoskeletal, integumentary, neurologic, psychiatric, endocrine. Positive findings noted below.   Modified ESAS Completed by: provider   Fatigue: 7 Drowsiness: 2     Pain: 0   Anxiety: 1 Nausea: 0   Anorexia: 10 Dyspnea: 0                    PHYSICAL EXAM:     From RN flowsheet:  Wt Readings from Last 3 Encounters:   04/13/21 194 lb 7.1 oz (88.2 kg)   04/07/21 169 lb 15.6 oz (77.1 kg)   04/06/21 190 lb 0.6 oz (86.2 kg)     Blood pressure 127/66, pulse 84, temperature 98.3 °F (36.8 °C), resp. rate 21, height 5' 7\" (1.702 m), weight 194 lb 7.1 oz (88.2 kg), SpO2 95 %. Pain Scale 1: Adult Nonverbal Pain Scale  Pain Intensity 1: 0              Pain Intervention(s) 1: Medication (see MAR)  Last bowel movement, if known:     Constitutional: ill appearing, alert without commands   Eyes: anicteric  ENMT: no nasal discharge, moist mucous membranes, +NGT  Cardiovascular: regular rhythm, distal pulses intact  Respiratory: breathing not labored, symmetric  Gastrointestinal:  Musculoskeletal: no deformity, no tenderness to palpation  Skin: warm, dry  Neurologic: somnolent today  Psychiatric:   Other:       HISTORY:     Active Problems:    Basal ganglia hemorrhage ([facility] [address] (4/7/2021)      History reviewed. No pertinent past medical history. History reviewed. No pertinent surgical history. History reviewed. No pertinent family history. History reviewed, no pertinent family history.   Social History     Tobacco Use    Smoking status: Not on file   Substance Use Topics    Alcohol use: Not on file     Allergies   Allergen Reactions    Pcn [Penicillins] Rash      Current Facility-Administered Medications   Medication Dose Route Frequency    morphine (10 mg/5 mL) 10 mg/5 mL oral solution 5 mg  5 mg Oral Q1H PRN    morphine injection 2 mg  2 mg IntraVENous Q5MIN PRN    morphine injection 2 mg  2 mg IntraVENous Q15MIN PRN    LORazepam (ATIVAN) injection 1 mg  1 mg IntraVENous Q30MIN PRN    hydrALAZINE (APRESOLINE) tablet 100 mg  100 mg Oral TID    hydrALAZINE (APRESOLINE) 20 mg/mL injection 10 mg  10 mg IntraVENous Q1H PRN    labetaloL (NORMODYNE;TRANDATE) injection 10 mg  10 mg IntraVENous Q30MIN PRN    insulin glargine (LANTUS) injection 25 Units  25 Units SubCUTAneous DAILY    metoprolol tartrate (LOPRESSOR) tablet 25 mg  25 mg Oral Q12H    levETIRAcetam (KEPPRA) oral solution 500 mg  500 mg Per NG tube Q12H    sodium chloride (NS) flush 5-40 mL  5-40 mL IntraVENous Q8H    sodium chloride (NS) flush 5-40 mL  5-40 mL IntraVENous PRN    acetaminophen (TYLENOL) tablet 650 mg  650 mg Oral Q6H PRN    Or    acetaminophen (TYLENOL) suppository 650 mg  650 mg Rectal Q6H PRN    promethazine (PHENERGAN) tablet 12.5 mg  12.5 mg Oral Q6H PRN    Or    ondansetron (ZOFRAN) injection 4 mg  4 mg IntraVENous Q6H PRN    senna-docusate (PERICOLACE) 8.6-50 mg per tablet 1 Tab  1 Tab Oral BID    bisacodyL (DULCOLAX) suppository 10 mg  10 mg Rectal DAILY PRN    glucose chewable tablet 16 g  4 Tab Oral PRN    dextrose (D50W) injection syrg 12.5-25 g  12.5-25 g IntraVENous PRN    glucagon (GLUCAGEN) injection 1 mg  1 mg IntraMUSCular PRN    lisinopriL (PRINIVIL, ZESTRIL) tablet 20 mg  20 mg Oral DAILY          LAB AND IMAGING FINDINGS:     Lab Results   Component Value Date/Time    WBC 15.2 (H) 04/14/2021 05:11 AM    HGB 12.7 04/14/2021 05:11 AM    PLATELET 699 28/77/9294 05:11 AM     Lab Results   Component Value Date/Time    Sodium 148 (H) 04/14/2021 05:11 AM    Potassium 3.5 04/14/2021 05:11 AM    Chloride 116 (H) 04/14/2021 05:11 AM    CO2 26 04/14/2021 05:11 AM    BUN 46 (H) 04/14/2021 05:11 AM    Creatinine 1.44 (H) 04/14/2021 05:11 AM    Calcium 8.3 (L) 04/14/2021 05:11 AM    Magnesium 3.0 (H) 04/14/2021 05:11 AM    Phosphorus 3.4 04/14/2021 05:11 AM      Lab Results   Component Value Date/Time    Alk.  phosphatase 84 04/06/2021 08:24 PM    Protein, total 8.3 (H) 04/06/2021 08:24 PM    Albumin 4.1 04/06/2021 08:24 PM    Globulin 4.2 (H) 04/06/2021 08:24 PM     No results found for: INR, PTMR, PTP, PT1, PT2, APTT, INREXT, INREXT   No results found for: IRON, FE, TIBC, IBCT, PSAT, FERR   No results found for: PH, PCO2, PO2  No components found for: [place]   Lab Results   Component Value Date/Time    CK 56 08/28/2018 09:06 PM    CK - MB <1.0 08/28/2018 09:06 PM                Total time: 65 min  Counseling / coordination time, spent as noted above: 60min  > 50% counseling / coordination?: y    Prolonged service was provided for  [x]30 min   []75 min in face to face time in the presence of the patient, spent as noted above. Time Start:  1230~1400  Time End:   Note: this can only be billed with 24525 (initial) or 98481 (follow up). If multiple start / stop times, list each separately.

## 2021-04-14 NOTE — PROGRESS NOTES
Transition of Care Plan   RUR- 18% Low Risk   DISPOSITION: The disposition plan is hospice consult referral submitted 4/14/21    Transport: [facility]/family     CM submitted a hospice consult referral via [employer]. CM contacted [facility] Nurse and left a voice message. [phone]. CM will continue to follow, provide support and assist with LISA needs as they arise.     MORE Ruiz,CRM

## 2021-04-14 NOTE — PROGRESS NOTES
Problem: Falls - Risk of  Goal: *Absence of Falls  Description: Document Aleida Ruts Fall Risk and appropriate interventions in the flowsheet. Outcome: Progressing Towards Goal  Note: Fall Risk Interventions:  Mobility Interventions: Communicate number of staff needed for ambulation/transfer, Patient to call before getting OOB, Strengthening exercises (ROM-active/passive), Utilize walker, cane, or other assistive device, Utilize gait belt for transfers/ambulation    Mentation Interventions: Adequate sleep, hydration, pain control, Door open when patient unattended, Evaluate medications/consider consulting pharmacy, Family/sitter at bedside, Increase mobility, More frequent rounding, Reorient patient, Room close to nurse's station, Toileting rounds, Update white board    Medication Interventions: Assess postural VS orthostatic hypotension, Evaluate medications/consider consulting pharmacy, Patient to call before getting OOB, Teach patient to arise slowly, Utilize gait belt for transfers/ambulation    Elimination Interventions: Call light in reach, Patient to call for help with toileting needs, Stay With Me (per policy), Toilet paper/wipes in reach, Toileting schedule/hourly rounds    History of Falls Interventions: Consult care management for discharge planning, Door open when patient unattended, Evaluate medications/consider consulting pharmacy, Room close to nurse's station, Utilize gait belt for transfer/ambulation, Assess for delayed presentation/identification of injury for 48 hrs (comment for end date), Vital signs minimum Q4HRs X 24 hrs (comment for end date)         Problem: Patient Education: Go to Patient Education Activity  Goal: Patient/Family Education  Outcome: Progressing Towards Goal     Problem: Pressure Injury - Risk of  Goal: *Prevention of pressure injury  Description: Document Desmond Scale and appropriate interventions in the flowsheet.   Outcome: Progressing Towards Goal  Note: Pressure Injury Interventions:  Sensory Interventions: Assess changes in LOC, Assess need for specialty bed, Avoid rigorous massage over bony prominences, Check visual cues for pain, Float heels, Keep linens dry and wrinkle-free, Maintain/enhance activity level, Minimize linen layers, Monitor skin under medical devices, Pressure redistribution bed/mattress (bed type), Sit a 90-degree angle/use footstool if needed, Turn and reposition approx. every two hours (pillows and wedges if needed), Use 30-degree side-lying position    Moisture Interventions: Absorbent underpads, Apply protective barrier, creams and emollients, Assess need for specialty bed, Check for incontinence Q2 hours and as needed, Internal/External urinary devices, Maintain skin hydration (lotion/cream), Minimize layers, Offer toileting Q_hr    Activity Interventions: Assess need for specialty bed, Increase time out of bed, Pressure redistribution bed/mattress(bed type)    Mobility Interventions: Assess need for specialty bed, Float heels, HOB 30 degrees or less, Pressure redistribution bed/mattress (bed type), Turn and reposition approx.  every two hours(pillow and wedges)    Nutrition Interventions: Document food/fluid/supplement intake, Discuss nutritional consult with provider, Offer support with meals,snacks and hydration(tube feeds)    Friction and Shear Interventions: Apply protective barrier, creams and emollients, Feet elevated on foot rest, HOB 30 degrees or less, Lift sheet, Lift team/patient mobility team, Minimize layers, Sit at 90-degree angle                Problem: Patient Education: Go to Patient Education Activity  Goal: Patient/Family Education  Outcome: Progressing Towards Goal     Problem: Non-Violent Restraints  Goal: Removal from restraints as soon as assessed to be safe  Outcome: Progressing Towards Goal  Goal: No harm/injury to patient while restraints in use  Outcome: Progressing Towards Goal  Goal: Patient's dignity will be maintained  Outcome: Progressing Towards Goal  Goal: Patient Interventions  Outcome: Progressing Towards Goal     Problem: Nutrition Deficit  Goal: *Optimize nutritional status  Outcome: Progressing Towards Goal     Problem: Patient Education: Go to Patient Education Activity  Goal: Patient/Family Education  Outcome: Progressing Towards Goal     Problem: Hemorrhagic Stroke: Day 5 through Discharge  Goal: Activity/Safety  Outcome: Progressing Towards Goal  Goal: Consults, if ordered  Outcome: Progressing Towards Goal  Goal: Diagnostic Test/Procedures  Outcome: Progressing Towards Goal  Goal: Nutrition/Diet  Outcome: Progressing Towards Goal  Goal: Medications  Outcome: Progressing Towards Goal  Goal: Respiratory  Outcome: Progressing Towards Goal  Goal: Treatments/Interventions/Procedures  Outcome: Progressing Towards Goal  Goal: Psychosocial  Outcome: Progressing Towards Goal  Goal: *Hemodynamically stable  Outcome: Progressing Towards Goal  Goal: *Verbalizes anxiety and depression are reduced or absent  Outcome: Progressing Towards Goal  Goal: *Absence of aspiration  Outcome: Progressing Towards Goal  Goal: *Absence of signs and symptoms of DVT  Outcome: Progressing Towards Goal  Goal: *Optimal pain control at patient's stated goal  Outcome: Progressing Towards Goal  Goal: *Tolerating diet  Outcome: Progressing Towards Goal  Goal: *Progressive mobility and function  Outcome: Progressing Towards Goal  Goal: *Rehabilitation readiness  Outcome: Progressing Towards Goal     Problem: Hemorrhagic Stroke: Discharge Outcomes  Goal: *Verbalizes anxiety and depression are reduced or absent  Outcome: Progressing Towards Goal  Goal: *Verbalize understanding of risk factor modification(Stroke Metric)  Outcome: Progressing Towards Goal  Goal: *Optimal pain control at patient's stated goal  Outcome: Progressing Towards Goal  Goal: *Hemodynamically stable  Outcome: Progressing Towards Goal  Goal: *Absence of aspiration pneumonia  Outcome: Progressing Towards Goal  Goal: *Aware of needed dietary changes  Outcome: Progressing Towards Goal  Goal: *Verbalizes understanding and describes medication purposes and frequencies  Outcome: Progressing Towards Goal  Goal: *Tolerating diet  Outcome: Progressing Towards Goal  Goal: *Absence of signs and symptoms of DVT  Outcome: Progressing Towards Goal  Goal: *Absence of aspiration  Outcome: Progressing Towards Goal  Goal: *Progressive mobility and function  Outcome: Progressing Towards Goal  Goal: *Home safety concerns addressed  Outcome: Progressing Towards Goal

## 2021-04-14 NOTE — HOSPICE
[facility]  [phone]     Patient Name: Amira Roper  YOB: 1940  Age: 80 y.o. [facility] RN Note:  Verbal Hospice consult received from Palliative Team, [address] [place] [address]. Currently reviewing chart. Will follow up with Unit Nurse and Care Manager to discuss plan of care, patient status and discharge disposition within the hour. Per Palliative, family is no longer at [facility] today, with plan to return on Thursday. Plan: Will review patient chart, and reach out to family to arrange family hospice information session for Thursday at [facility]. Thank you for the opportunity to be of service to this patient. 15:30: Bedside assessment. Update received from nurse caring for patient as well as CM covering patient. Plan to contact family to review goals of care. 15:45: Call made to patient wife, Juan Conley. No answer, so voicemail was left with hospice contact information. Offered hospice team would try to reach her again later this evening, with hopes to visit her and her family Thursday am.    17:10 Juan Conley returned call. Discussed patient briefly as well as planning to meet with hospice team. Taisha Shah said that she works with special needs children in the am, and is free after 9:00am. Plan to meet with Taisha Shah on Thursday at 10:00am bedside. Taisha Shah states that her son and daughter may also come to [facility] for hospice meeting.     Ronald Bowen RN, [place] [address] Nurse Liaison  [phone] [place]  [phone] Office

## 2021-04-14 NOTE — PROGRESS NOTES
Problem: Falls - Risk of  Goal: *Absence of Falls  Description: Document Loi Kulkarni Fall Risk and appropriate interventions in the flowsheet.   Outcome: Progressing Towards Goal  Note: Fall Risk Interventions:  Mobility Interventions: Assess mobility with egress test, Bed/chair exit alarm, Communicate number of staff needed for ambulation/transfer    Mentation Interventions: Adequate sleep, hydration, pain control, Bed/chair exit alarm, Door open when patient unattended    Medication Interventions: Assess postural VS orthostatic hypotension, Bed/chair exit alarm, Evaluate medications/consider consulting pharmacy, Patient to call before getting OOB    Elimination Interventions: Bed/chair exit alarm, Call light in reach    History of Falls Interventions: Bed/chair exit alarm

## 2021-04-15 NOTE — PROGRESS NOTES
Transition of Care Plan   RUR- 16% Low Risk   DISPOSITION: The disposition plan is to transition to [facility] - patient accepted.  Transport: [facility] - 5:15pm    CM was informed by Hospice SW to contact [facility] - Shana Marin, [address] [facility]. CM spoke with the Coordinator regarding patient transitioning to facility. Patient will need a Rapid Covid test before he discharges. She reports it's okay for patient to transition at 5:15pm today to his next placement. CM informed patient's assigned nurse. CM also informed patients assigned physician. Call to report number: [phone]  Room Number: N/A    CM contacted [facility] to request for  and the earliest time avaialble has been set for 5:15pm today. CM was encouraged to contact [facility] and left a voice message [phone]. [facility] will provide transportation at 5:15pm - 4/15/2021. AVS has been updated. [facility] packet available at chart. CM will continue to follow, provide support and assist with LISA needs as they arise.     Danny Parker Odalis (daughter)

## 2021-04-15 NOTE — PROGRESS NOTES
Problem: Falls - Risk of  Goal: *Absence of Falls  Description: Document Brit Murphy Fall Risk and appropriate interventions in the flowsheet.   Outcome: Progressing Towards Goal  Note: Fall Risk Interventions:  Mobility Interventions: Communicate number of staff needed for ambulation/transfer    Mentation Interventions: Adequate sleep, hydration, pain control, Door open when patient unattended, Evaluate medications/consider consulting pharmacy, Family/sitter at bedside, Increase mobility, More frequent rounding, Reorient patient, Room close to nurse's station, Toileting rounds, Update white board    Medication Interventions: Assess postural VS orthostatic hypotension, Evaluate medications/consider consulting pharmacy, Patient to call before getting OOB, Teach patient to arise slowly, Utilize gait belt for transfers/ambulation    Elimination Interventions: Call light in reach, Patient to call for help with toileting needs, Stay With Me (per policy), Toilet paper/wipes in reach, Toileting schedule/hourly rounds    History of Falls Interventions: Consult care management for discharge planning, Door open when patient unattended, Evaluate medications/consider consulting pharmacy, Room close to nurse's station, Utilize gait belt for transfer/ambulation, Assess for delayed presentation/identification of injury for 48 hrs (comment for end date), Vital signs minimum Q4HRs X 24 hrs (comment for end date)         Problem: Patient Education: Go to Patient Education Activity  Goal: Patient/Family Education  Outcome: Progressing Towards Goal

## 2021-04-15 NOTE — HOSPICE
[facility]  [phone]    Inpatient Nursing PRE Admission   Patient Name: Wisam Miles  YOB: 1940  Age: 80 y.o. Date of PLANNED Hospice Admission: 04/15/2021  Hospice Attending:Dr. Bianca Tillman  Primary Care Physician: Cheryl Boggs MD     Home Hospice Zip Code: [address]  Expected  (if patient transferred to [facility]): TBD    ADVANCE CARE PLANNING    Code Status: DNR  Durable DNR: [x]  Yes  []  No  No flowsheet data found. Bahai: Amish   Home: TBD    HOSPICE SUMMARY   ER Visits/ Hospitalizations in past year:1  Hospice Diagnosis:CVA  Onset Date of Hospice Diagnosis:2021  Summary of Disease Progression Leading to Hospice Diagnosis: Wisam Miles is a 80 y.o. male admitted on 2021 as a transfer from [facility] for neurosurgical services with a diagnosis of AMS due to large left basal ganglia and frontal lobe intracranial parenchymal hemorrhage. No surgical interventions offered. Pt with resultant dysphagia, dysarthria, and right sided weakness with high risk of aspiration. Family has decided against placing PEG tube and has opted instead for comfort focus care and admit to hospice. Co-Morbidities:   Patient Active Problem List   Diagnosis Code    Basal ganglia hemorrhage (HCC) I61.0     Diagnoses RELATED to the terminal prognosis: HTN  Other Diagnoses: Type 2 DM, hyponatremia  Rationale for a prognosis of life expectancy of 6 months or less if the disease follows its normal course (Disease Specific History):     Wisam Miles is a 80 y.o. who was admitted to [facility]. The patient's principle diagnosis of CVA  has resulted in weakness, edema, malnutrition, pain, agitation. Functionally, the patient's Palliative Performance Scale has declined over a period of one week and is estimated at 30.  Objective information that support this patients limited prognosis includes:     Head CT:    FINDINGS: Left frontal parenchymal hemorrhage is minimally decreased. It  measures 45 x 57 x 51 mm, versus 48 x 60 x 54 mm on 4/6/2021. Edema and  petechial hemorrhages surrounded in the left frontal lobe. There is effacement  of the local sulci and stable, 12 mm, left to right midline shift. Hemorrhage  extends through the wall of the left lateral ventricle, and subarachnoid  hemorrhages layers dependently in the left greater than right lateral  ventricles. This is also stable. Dilation of the left lateral ventricle,  particularly the temporal horn, is stable.     A tiny subarachnoid hemorrhage in the posterior right sylvian fissure is more  faint and subtle today (401-58).    Trace hyperdensity in a chronic, right inferior cerebellar infarction (2-8)  could represent hemorrhage. However, there was no magnetic susceptibility  artifact within this on yesterday's MRI. Trace subarachnoid hemorrhage extends  through the foramina of Luschka.     IMPRESSION  1. Stable left frontal parenchymal hemorrhage. 2. Stable mass effect. MRI of head:    IMPRESSION  Evaluation is significantly limited by motion.     1. Stable size of left basal ganglia and frontal lobe intraparenchymal  hemorrhage, with stable surrounding edema and mass effect resulting in 14 mm of  rightward midline shift. 2. Stable intraventricular hemorrhage. Stable ventricular size. 3. Few punctate foci of acute infarction in the parasagittal left frontoparietal  lobe and left lateral frontal lobe. 4. Generalized parenchymal volume loss and mild chronic microvascular ischemic  disease with multiple small chronic white matter infarcts. Chronic infarct in  the right inferior cerebellum.       Left basal ganglia and frontal lobe intraparenchymal hemorrhage with surrounding brain edema, mass-effect with rightward midline shift.    out of ICU 4/17   Acute metabolic encephalopathy due to the above   Diabetes mellitus type 2   Leukocytosis, low-grade fever on 4/9, aspiration suspected. Not septic or toxic. Low grade fever and leukocytosis persisted  Dysphagia    The patient/family chose comfort measures with the support of Hospice. Patient meets for routine LOC as evidenced by stable for transport, going to group home- safe discharge plan, able to tolerate sublingual medications    Verbal certification of terminal diagnosis with life expectancy of 6 months or less received from: Dr. Yosi Brar    Prognosis estimated based on 04/15/21 clinical assessment is:   [] Few to Many Hours  [] Hours to Days   [x] Few to Many Days   [] Days to Weeks   [] Few to Many Weeks   [] Weeks to Months   [] Few to Many Months    CLINICAL INFORMATION   Allergies: Allergies   Allergen Reactions    Pcn [Penicillins] Rash       Wt Readings from Last 3 Encounters:   04/13/21 88.2 kg (194 lb 7.1 oz)   04/07/21 77.1 kg (169 lb 15.6 oz)   04/06/21 86.2 kg (190 lb 0.6 oz)     Ht Readings from Last 3 Encounters:   04/08/21 5' 7\" (1.702 m)   04/07/21 5' 7\" (1.702 m)   04/06/21 5' 9\" (1.753 m)     Body mass index is 30.45 kg/m². Visit Vitals  BP (!) 186/116 (BP 1 Location: Left arm, BP Patient Position: At rest)   Pulse 91   Temp 100.1 °F (37.8 °C)   Resp 21   Ht 5' 7\" (1.702 m)   Wt 88.2 kg (194 lb 7.1 oz)   SpO2 96%   BMI 30.45 kg/m²       LAB VALUES  No results found for this visit on 04/07/21 (from the past 12 hour(s)). No results found for this visit on 04/07/21 (from the past 6 hour(s)). Lab Results   Component Value Date/Time    Protein, total 8.3 (H) 04/06/2021 08:24 PM    Albumin 4.1 04/06/2021 08:24 PM       Currently this patient has:  [] Supplemental O2 [x] Peripheral IV  [] PICC    [] PORT   [] Torrez Catheter [] NG Tube   [] PEG Tube [] Ostomy    [] AICD: Has ICD been deactivated?   [] Yes [] No:______  Condom catheter    Progression to DEPENDENCE WITH ADLs (include time frame): today  x Dependent for bathing: personal hygiene and grooming   x- Dependent for dressing: dressing and undressing x- Dependent for transferring: movement and mobility   x- Dependent for toileting: continence-related tasks including control and hygiene   x- Dependent for eating: preparing food and feeding    ASSESSMENT & PLAN     1. Symptom Issues Identified: hypertensive, breathing labored at times, increased edema 3+ in right hand    2. Spiritual Issues Identified:     3. 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 4.  Care Coordination:   Transfer to: [address], [facility] Patient required a rapid covid test for placement and it was negative. Report given to:admit RN via this report    Transportation by: YAJAIRA   Scheduled for 5:15 pm    Medications Needs: SRK to pickup in [facility] pharmacy and will go with patient.      Current Facility-Administered Medications   Medication Dose Route Frequency    levETIRAcetam (KEPPRA) 500 mg in 0.9% sodium chloride 100 mL IVPB  500 mg IntraVENous Q12H    morphine (10 mg/5 mL) 10 mg/5 mL oral solution 5 mg  5 mg Oral Q1H PRN    morphine injection 2 mg  2 mg IntraVENous Q15MIN PRN    LORazepam (ATIVAN) injection 1 mg  1 mg IntraVENous Q30MIN PRN    hydrALAZINE (APRESOLINE) 20 mg/mL injection 10 mg  10 mg IntraVENous Q1H PRN    labetaloL (NORMODYNE;TRANDATE) injection 10 mg  10 mg IntraVENous Q30MIN PRN    insulin glargine (LANTUS) injection 25 Units  25 Units SubCUTAneous DAILY    sodium chloride (NS) flush 5-40 mL  5-40 mL IntraVENous Q8H    sodium chloride (NS) flush 5-40 mL  5-40 mL IntraVENous PRN    acetaminophen (TYLENOL) suppository 650 mg  650 mg Rectal Q6H PRN    promethazine (PHENERGAN) tablet 12.5 mg  12.5 mg Oral Q6H PRN     Or    ondansetron (ZOFRAN) injection 4 mg  4 mg IntraVENous Q6H PRN    bisacodyL (DULCOLAX) suppository 10 mg  10 mg Rectal DAILY PRN    glucose chewable tablet 16 g  4 Tab Oral PRN    dextrose (D50W) injection syrg 12.5-25 g  12.5-25 g IntraVENous PRN    glucagon (GLUCAGEN) injection 1 mg  1 mg IntraMUSCular PRN          DME: OBT, suction, O2, and overlay for bed, [facility] by Michael Gimenez.      Supplies: TBD

## 2021-04-15 NOTE — HOSPICE
Patient transport delayed until 1830, hospice is aware and can still admit this evening. If transport is delayed again, admission will have to take place tomorrow and discharge will be delayed.

## 2021-04-15 NOTE — PROGRESS NOTES
Nutrition Note    Pt reviewed for f/u. Family had been deciding PEG vs hospice/comfort care. Decision for hospice has been made with transport to care home later today. RD to sign off, and will f/u if re-consulted or if plan of care changes.      Electronically signed by Gary Suarez RD on 4/15/2021 at 12:49 PM    Contact via [address] [place] [address]

## 2021-04-15 NOTE — CONSULTS
[address] Name:  Andre Novoa 
MR#:  [id number] :  1940 ACCOUNT #:  [de-identified] DATE OF SERVICE:  2021 CHIEF COMPLAINT:  This is an 40-year-old man whom I wrote a note regarding no neurosurgical intervention necessary. This is a formal consult note attesting to that. HISTORY OF PRESENT ILLNESS:  This is an 40-year-old man with past medical history of type 2 diabetes and hypertension, brought to the emergency room with altered mental status. The patient was found to be somnolent. CT scan showed a large intraparenchymal hemorrhage. Family made the patient DNR and he was not deemed appropriate for surgical intervention. PHYSICAL EXAMINATION: 
VITAL SIGNS:  On admission, blood pressure was 136/78, pulse rate 74, respiratory rate 16. NEUROLOGIC:  Right hemiparesis, does not open eyes, does not follow commands. ASSESSMENT AND PLAN:  Imaging studies revealed a large left frontal parenchymal hemorrhage with subarachnoid extension into lateral ventricle. It was felt that this was not something given the patient's condition and age and other medical problems that was appropriate for surgical intervention. I discussed this with the hospitalist service. He was placed on seizure prophylaxis and comfort care. We remained available for questions. MD CHARLENE Villa/[id number] 
D:  04/15/2021 15:02 
T:  04/15/2021 16:36 
JOB #:  [id number]

## 2021-04-15 NOTE — HOSPICE
Hospice home medications given to [facility] charge nurse for patient to be discharged with. SRK kit.

## 2021-04-15 NOTE — HOSPICE
[facility]  [phone]    Patient Name: Arlene Putnam  YOB: 1940  Age: 80 y.o. [facility]  LCSW Note:  Hospice consult noted. Chart reviewed. Plan of care discussed with patients nurse & care manager. This LCSW and Christi Grijalva RN in to meet with pt, who is non-verbal and debilitated due to CVA, his wife Shon Mcelroy and daughter Alphonso Adler. Discussed Hospice philosophy, general plan of care, levels of care, services and on call procedures. Pt does not meet GIP loc, will be dc today at the routine LOC. [address] admission wass scheduled for this afternoon at 4pm. Transportation arranged by CM,  time at 5:15 pm, the earliest available. .   12: 27 pm Hospice admission will have to be pushed back due to late transport time. LCSW will update CM when new admission time is arranged. Pt will be going to [address] ( [phone]). Corrie's father in-law was at [facility] for EOL care @ a year ago. Angela Long is very familiar with the home and close to Ms. Arya Arce. DDNR      COVID Rapid, negative results. Pt and wife have been  for 39 yrs, they have a blended family of 3 children; daughter Alphonso Adler, and sons Jacqueline Cheek and Millie Enriquez is a retired [employer] worker, worked for the [employer] for 53 yrs, right after high school. Consents Reviewed: Yes  Person Reviewed/Signed with: Shon Mcelroy  Right to NCD Reviewed: Yes  NCD Requested: Yes   Admission Nurse/Intake Notified: Yes   Planned Start of Care Date: 4/15/2021  Hospice Witness Representative: Massiel Mccarty LCSW     Thank you for the opportunity to be of service to Mr. Marshall Nascimento and his family. Please contact [employer] with any questions or concerns.      Letty Aleman LCSW, [facility]  [phone]

## 2021-04-15 NOTE — PROGRESS NOTES
[facility] Adult  Hospitalist Group                                                                                          Hospitalist Progress Note  Kathy Harding MD  Answering service: [phone] OR [phone] from in house phone        Date of Service:  4/15/2021  NAME:  Betty Navarrete  :  1940  MRN:  [id number]      Admission Summary:   History and physical was obtained from reviewing his chart and examination of the patient. Patient is not verbal at this time. patient is a 78-year-old male past medical history of type 2 diabetes mellitus, hypertension, who was brought to the emergency department on 2021 with altered mental status. Baseline patient is well oriented in time place and person. Patient was seen normal on . Patient was found to be somnolent and sleeping in his recliner at the a.m. of . Patient received Narcan on the way to emergency without any changes in his mentation patient was taken to the [address] [facility] ER, CT scan showed large intracranial parenchymal hemorrhage. Patient was transferred to [facility] on . Patient was seen by neurosurgeons and neurologist.  Patient is not a surgical candidate as per neurosurgeons. At ICU, patient noted Cardene to control his blood pressure, and NG tube for dysphagia. Patient CODE STATUS was changed to DNR/DNI. Transferred out of the ICU on . 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  - out of ICU   - hospice /comfort care forward  - 4/15/2021     Acute metabolic encephalopathy due to the above  -Being managed conservatively. initially seen  by neurology / neurosurgery. - initially : BP management to keep SBP<140. BP in target mostly except occasional spikes in the 160-170 when stimulated. Discussed with RN to resume the Cardene and wean down slowly  -Seizure prophylaxis with Keppra  -Neurochecks.  -Seizure and aspiration precautions  - comfort care, hospice bound 4/14 - forward         Diabetes mellitus type 2,   - fairly controlled with hemoglobin A1c of 6.4 now with hyperglycemia. Home regimen includes glipizide SR and Metformin. - Accu-Cheks along with Humalog sliding scale every 6  - Increase dose of Lantus today. - comfort only 4/15/2021      Leukocytosis, low-grade fever on 4/9, aspiration suspected. Not septic or toxic. Low grade fever and leukocytosis persisted  - Continue ceftriaxone. Add flagyl(pt with pcn allergy)  - Sputum cx from 4/8 grew mssa. Fever curve and wbc coming down on current regimen,continue. Discussed with pharmacy. - no further following 2n2 comfort measures only        Dysphagia  -Getting nutrition via NGT but now being transitioned to comfort care. - nPO Not expected to improve, ngt out, comfort measures only 4/14 forward      Hyponatremia: Increase free water flush via the NGT.     Palliative medicine consult to assist with [facility] discussion         CODE STATUS: DNR  DVT prophylaxis: SCD  Palliative medicine met with family plan to transition to comfort care,hospice consulted.      Hospital Problems  Never Reviewed          Codes Class Noted POA    Basal ganglia hemorrhage ([facility] [address] ICD-10-CM: I61.0  ICD-9-CM: 294  4/7/2021 Unknown                  After personally interviewing pt at bedside the following is noted on 4/15/2021 :    Review of Systems   Reason unable to perform ROS: withdrawn               I had a face to face encounter with patient on 4/15/2021 at bedside for the following physical exam:     PHYSICAL EXAM:    Visit Vitals  BP (!) 186/116 (BP 1 Location: Left arm, BP Patient Position: At rest)   Pulse 91   Temp 100.1 °F (37.8 °C)   Resp 21   Ht 5' 7\" (1.702 m)   Wt 88.2 kg (194 lb 7.1 oz)   SpO2 96%   BMI 30.45 kg/m²          Physical Exam  Constitutional:       Comments: Listless    HENT:      Right Ear: External ear normal.      Left Ear: External ear normal.      Nose: Nose normal.      Mouth/Throat:      Mouth: Mucous membranes are dry. Pharynx: Oropharynx is clear. Neurological:      Comments: flacid on my exam, listless    Psychiatric:      Comments: Not agitated, not anxios, fairly listless                      Data Review:    Review and/or order of clinical lab test      Labs:     Recent Labs     04/14/21  0511 04/13/21  0554   WBC 15.2* 13.1*   HGB 12.7 12.2   HCT 40.8 38.8    238     Recent Labs     04/14/21  0511 04/13/21  0554   * 148*   K 3.5 3.9   * 114*   CO2 26 25   BUN 46* 39*   CREA 1.44* 1.14   * 266*   CA 8.3* 8.4*   MG 3.0* 2.9*   PHOS 3.4 4.0     No results for input(s): ALT, AP, TBIL, TBILI, TP, ALB, GLOB, GGT, AML, LPSE in the last 72 hours. No lab exists for component: SGOT, GPT, AMYP, HLPSE  No results for input(s): INR, PTP, APTT, INREXT in the last 72 hours. No results for input(s): FE, TIBC, PSAT, FERR in the last 72 hours. No results found for: FOL, RBCF   No results for input(s): PH, PCO2, PO2 in the last 72 hours. No results for input(s): CPK, CKNDX, TROIQ in the last 72 hours.     No lab exists for component: CPKMB  Lab Results   Component Value Date/Time    Cholesterol, total 160 04/07/2021 02:25 AM    HDL Cholesterol 60 04/07/2021 02:25 AM    LDL, calculated 69 04/07/2021 02:25 AM    Triglyceride 155 (H) 04/07/2021 02:25 AM    CHOL/HDL Ratio 2.7 04/07/2021 02:25 AM     Lab Results   Component Value Date/Time    Glucose (POC) 254 (H) 04/14/2021 06:37 PM    Glucose (POC) 347 (H) 04/14/2021 12:17 PM    Glucose (POC) 295 (H) 04/14/2021 06:41 AM    Glucose (POC) 317 (H) 04/13/2021 11:46 PM    Glucose (POC) 280 (H) 04/13/2021 06:24 PM     Lab Results   Component Value Date/Time    Color YELLOW/STRAW 04/06/2021 08:29 PM    Appearance CLEAR 04/06/2021 08:29 PM    Specific gravity 1.021 04/06/2021 08:29 PM    pH (UA) 5.5 04/06/2021 08:29 PM    Protein TRACE (A) 04/06/2021 08:29 PM    Glucose Negative 04/06/2021 08:29 PM    Ketone 80 (A) 04/06/2021 08:29 PM    Bilirubin NEGATIVE  08/28/2018 10:46 PM    Urobilinogen 0.2 04/06/2021 08:29 PM    Nitrites Negative 04/06/2021 08:29 PM    Leukocyte Esterase Negative 04/06/2021 08:29 PM    Epithelial cells FEW 04/06/2021 08:29 PM    Bacteria Negative 04/06/2021 08:29 PM    WBC 0-4 04/06/2021 08:29 PM    RBC 0-5 04/06/2021 08:29 PM         Medications Reviewed:     Current Facility-Administered Medications   Medication Dose Route Frequency    levETIRAcetam (KEPPRA) 500 mg in 0.9% sodium chloride 100 mL IVPB  500 mg IntraVENous Q12H    morphine (10 mg/5 mL) 10 mg/5 mL oral solution 5 mg  5 mg Oral Q1H PRN    morphine injection 2 mg  2 mg IntraVENous Q15MIN PRN    LORazepam (ATIVAN) injection 1 mg  1 mg IntraVENous Q30MIN PRN    hydrALAZINE (APRESOLINE) 20 mg/mL injection 10 mg  10 mg IntraVENous Q1H PRN    labetaloL (NORMODYNE;TRANDATE) injection 10 mg  10 mg IntraVENous Q30MIN PRN    insulin glargine (LANTUS) injection 25 Units  25 Units SubCUTAneous DAILY    sodium chloride (NS) flush 5-40 mL  5-40 mL IntraVENous Q8H    sodium chloride (NS) flush 5-40 mL  5-40 mL IntraVENous PRN    acetaminophen (TYLENOL) suppository 650 mg  650 mg Rectal Q6H PRN    promethazine (PHENERGAN) tablet 12.5 mg  12.5 mg Oral Q6H PRN    Or    ondansetron (ZOFRAN) injection 4 mg  4 mg IntraVENous Q6H PRN    bisacodyL (DULCOLAX) suppository 10 mg  10 mg Rectal DAILY PRN    glucose chewable tablet 16 g  4 Tab Oral PRN    dextrose (D50W) injection syrg 12.5-25 g  12.5-25 g IntraVENous PRN    glucagon (GLUCAGEN) injection 1 mg  1 mg IntraMUSCular PRN     ______________________________________________________________________  EXPECTED LENGTH OF STAY: 4d 9h  ACTUAL LENGTH OF STAY:          8                 Larissa Nguyen MD

## 2021-04-16 NOTE — PROGRESS NOTES
2000  Received call from [facility], Mr. Tamera Caldera has NOT been picked up by [facility]. Tara Porter asked if we can set up transport for tomorrow AM 0900. Discussed census and would need to talk to Nursing Supervisor. Called Unit, verified they are NOT on unit. Called [facility], they state crew is onsite. Inquired about the ETA that was given at 1800 for 1830 and CM was NOT called. [facility] states they have NOT received call from [facility].          1800 Received call from [facility]. They have had Hospice Team awaiting transport, transport was initially set up for 1430 and has been pushed back to 2100. [facility] is NOT able to admit patient tonight if [facility] does not come by 1830. Spoke with [facility], asked to speak to supervisor, spoke with [email or web address] and was given 1830 ETA.     [facility] ([facility]) phone [phone]    Familia Kapoor, RN, BSN, [facility]  ED Care Management  [phone]

## 2021-04-21 NOTE — DISCHARGE SUMMARY
Discharge Summary       PATIENT ID: Macey Shah  MRN: [id number]   YOB: 1940    DATE OF ADMISSION: 4/7/2021  1:29 AM    DATE OF DISCHARGE: 4/15/2021   PRIMARY CARE PROVIDER: Elo Knapp MD     ATTENDING PHYSICIAN: Amanda Lee MD   DISCHARGING PROVIDER: Amanda Lee MD    To contact this individual call [phone] and ask the  to page. If unavailable ask to be transferred the Adult Hospitalist Department. CONSULTATIONS: IP CONSULT TO NEUROLOGY    PROCEDURES/SURGERIES: * No surgery found *    ADMITTING DIAGNOSES & HOSPITAL COURSE:   Pt hospice bound    Per recent note:       Admission Summary:   History and physical was obtained from reviewing his chart and examination of the patient. Yoan Mccoy is not verbal at this time. patient is a 80-year-old male past medical history of type 2 diabetes mellitus, hypertension, who was brought to the emergency department on April 6, 2021 with altered mental status.  Baseline patient is well oriented in time place and person. Yoan Mccoy was seen normal on April 5.  Patient was found to be somnolent and sleeping in his recliner at the a.m. of April 6. Patient received Narcan on the way to emergency without any changes in his mentation patient was taken to the [facility] ER, CT scan showed large intracranial parenchymal hemorrhage.  Patient was transferred to [facility] on April 7.  Patient was seen by neurosurgeons and neurologist. Yoan Mccoy is not a surgical candidate as per neurosurgeons.  At ICU, patient noted Cardene to control his blood pressure, and NG tube for dysphagia. Patient CODE STATUS was changed to DNR/DNI.    Transferred out of the ICU on 4/9.     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  - out of ICU 4/14  - hospice /comfort care forward 4/14 - 4/15/2021      Acute metabolic encephalopathy due to the above  -Being managed conservatively.  initially seen  by neurology / neurosurgery. - initially : BP management to keep SBP<140. BP in target mostly except occasional spikes in the 160-170 when stimulated. Discussed with RN to resume the Cardene and wean down slowly  -Seizure prophylaxis with Keppra  -Neurochecks.  -Seizure and aspiration precautions  - comfort care, hospice bound 4/14 - forward         Diabetes mellitus type 2,   - fairly controlled with hemoglobin A1c of 6.4 now with hyperglycemia.  Home regimen includes glipizide SR and Metformin. - Accu-Cheks along with Humalog sliding scale every 6  - Increase dose of Lantus today. - comfort only 4/15/2021      Leukocytosis, low-grade fever on 4/9, aspiration suspected. Not septic or toxic. Low grade fever and leukocytosis persisted  - Continue ceftriaxone. Add flagyl(pt with pcn allergy)  - Sputum cx from 4/8 grew mssa. Fever curve and wbc coming down on current regimen,continue. Discussed with pharmacy. - no further following 2n2 comfort measures only         Dysphagia  -Getting nutrition via NGT but now being transitioned to comfort care.   - nPO Not expected to improve, ngt out, comfort measures only 4/14 forward      Hyponatremia: Increase free water flush via the NGT.     Palliative medicine consult to assist with [facility] discussion         CODE STATUS: DNR  DVT prophylaxis: SCD  Palliative medicine met with family plan to transition to comfort care,hospice consulted.            DISCHARGE DIAGNOSES / PLAN:      1.  as above        FOLLOW UP APPOINTMENTS:    Follow-up Information     Follow up With Specialties Details Why Contact Info    [facility] [address], [place], [address] [facility]  [phone]    Elinor Chambers MD Family Medicine   [address] [place] [address]  [phone]              DISCHARGE MEDICATIONS:  Discharge Medication List as of 4/15/2021  6:52 PM      STOP taking these medications       lisinopriL (PRINIVIL, ZESTRIL) 20 mg tablet Comments:   Reason for Stopping:         glipiZIDE SR (GLUCOTROL XL) 10 mg CR tablet Comments:   Reason for Stopping:         hydroCHLOROthiazide (MICROZIDE) 12.5 mg capsule Comments:   Reason for Stopping:         metFORMIN (GLUCOPHAGE) 1,000 mg tablet Comments:   Reason for Stopping:         ondansetron (ZOFRAN ODT) 4 mg disintegrating tablet Comments:   Reason for Stopping:                 NOTIFY YOUR PHYSICIAN FOR ANY OF THE FOLLOWING:   Fever over 101 degrees for 24 hours. Chest pain, shortness of breath, fever, chills, nausea, vomiting, diarrhea, change in mentation, falling, weakness, bleeding. Severe pain or pain not relieved by medications. Or, any other signs or symptoms that you may have questions about.     DISPOSITION:    Home With:   OT  PT  HH  RN       Long term SNF/Inpatient Rehab    Independent/assisted living   x Hospice    Other:        CHRONIC MEDICAL DIAGNOSES:  Problem List as of 4/15/2021 Never Reviewed          Codes Class Noted - Resolved    [facility] ICD-10-CM: I61.0  ICD-9-CM: 544  4/7/2021 - Present              Greater than 20 minutes were spent with the patient on counseling and coordination of care    Signed:   Guera Lomas MD  4/21/2021  2:45 PM

## 2021-04-27 ENCOUNTER — HOME CARE VISIT (OUTPATIENT)
Dept: HOSPICE | Facility: HOSPICE | Age: 81
End: 2021-04-27
Payer: MEDICARE

## 2021-04-30 ENCOUNTER — HOME CARE VISIT (OUTPATIENT)
Dept: HOSPICE | Facility: HOSPICE | Age: 81
End: 2021-04-30
Payer: MEDICARE

## 2023-08-09 NOTE — PROGRESS NOTES
Comprehensive Nutrition Assessment    Type and Reason for Visit: Initial, Consult    Nutrition Recommendations/Plan:      Replace phosphorus    Start enteral nutrition support: Glucerna 1.2 @ 65 ml/hr with 100 ml water flush q 4 hr    Nutrition Assessment:    Pt admitted with Basal ganglia hemorrhage. PMHx: DM 2, HTN. No surgical intervention. Acute metabolic encephalopathy-not appropriate for therapy. DHT in place for enteral feeding and medications. Daughter at bedside and reports her father more than likely will be transitioned to hospice care in the next few days. Per daughter pt has not had any unexplained weight loss or decline in appetite PTA. Consult received for tube feeding recommendations. Suggest the following: Glucerna 1.2 @ 65 ml/hr with 100 ml water flush q 4 hr. This will provide 1430 ml, 1715 calories, 86 gm protein and 1750 ml free water (tube feeding/flush) per day to meet estimated needs. A1c elevated @ 6.4%. BG up to 239 mg/dl on am labs. Lantus ordered today. Phosphorus slightly BNL-may wish to replace. Continue to monitor. Malnutrition Assessment:  Malnutrition Status:  No malnutrition    Context:  Acute illness       Nutritionally Significant Medications:   Humalog, Pericolace, Effer-K, Lantus    Estimated Daily Nutrient Needs:  Energy (kcal): 1700 (MSJ x 1.2); Weight Used for Energy Requirements: Current(75 kg)  Protein (g): 75-90 (1.0-1.2g/kg); Weight Used for Protein Requirements: Current  Fluid (ml/day): 1700; Method Used for Fluid Requirements: 1 ml/kcal    Nutrition Related Findings:       BM: PTA  Edema: None  Wounds:  None       Current Nutrition Therapies:   Diet:NPO  Additional Caloric Sources:  None    Anthropometric Measures:  · Height:  5' 7\" (170.2 cm)  · Current Body Wt:  74.8 kg (164 lb 14.5 oz)   · Admission Body Wt:  165 lb 12.6 oz    · Ideal Body Wt:  148#:  111.4 %   · BMI Categories:  Overweight (BMI 25.0-29. 9)     Wt Readings from Last 10 Encounters: Discharge instructions provided and reviewed with pt and daughter at bedside. All questions answered at this time. Pt and daughter verbalize an understanding of importance of medication compliance and scheduling/attending follow up appointments. Pt and daughter gathered all belongings for discharge. 04/08/21 74.8 kg (165 lb)   04/07/21 77.1 kg (169 lb 15.6 oz)   04/06/21 86.2 kg (190 lb 0.6 oz)       Nutrition Diagnosis:   · Swallowing difficulty related to cognitive or neurological impairment as evidenced by NPO or clear liquid status due to medical condition    Nutrition Interventions:   Food and/or Nutrient Delivery: Start tube feeding  Nutrition Education and Counseling: No recommendations at this time  Coordination of Nutrition Care: Continue to monitor while inpatient, Interdisciplinary rounds    Goals: Tolerate tube feeding at goal in next 2-3 days. Nutrition Monitoring and Evaluation:   Behavioral-Environmental Outcomes: None identified  Food/Nutrient Intake Outcomes: Enteral nutrition intake/tolerance  Physical Signs/Symptoms Outcomes: Biochemical data, GI status, Weight    Discharge Planning:     Too soon to determine     Eddie Chou RD CNSC  Contact: [employer]